# Patient Record
Sex: FEMALE | Race: BLACK OR AFRICAN AMERICAN | NOT HISPANIC OR LATINO | Employment: OTHER | URBAN - METROPOLITAN AREA
[De-identification: names, ages, dates, MRNs, and addresses within clinical notes are randomized per-mention and may not be internally consistent; named-entity substitution may affect disease eponyms.]

---

## 2017-03-17 ENCOUNTER — OFFICE VISIT (OUTPATIENT)
Dept: CARDIOLOGY | Facility: CLINIC | Age: 47
End: 2017-03-17
Payer: MEDICARE

## 2017-03-17 VITALS
DIASTOLIC BLOOD PRESSURE: 60 MMHG | WEIGHT: 213.38 LBS | HEIGHT: 71 IN | HEART RATE: 73 BPM | SYSTOLIC BLOOD PRESSURE: 104 MMHG | BODY MASS INDEX: 29.87 KG/M2 | OXYGEN SATURATION: 98 %

## 2017-03-17 DIAGNOSIS — K58.9 IRRITABLE BOWEL SYNDROME, UNSPECIFIED TYPE: ICD-10-CM

## 2017-03-17 DIAGNOSIS — I35.0 AORTIC VALVE STENOSIS, UNSPECIFIED ETIOLOGY: ICD-10-CM

## 2017-03-17 DIAGNOSIS — R07.9 CHEST PAIN, UNSPECIFIED TYPE: Primary | ICD-10-CM

## 2017-03-17 DIAGNOSIS — E78.5 HYPERLIPIDEMIA, UNSPECIFIED HYPERLIPIDEMIA TYPE: ICD-10-CM

## 2017-03-17 PROCEDURE — 93005 ELECTROCARDIOGRAM TRACING: CPT | Mod: PBBFAC | Performed by: INTERNAL MEDICINE

## 2017-03-17 PROCEDURE — 99213 OFFICE O/P EST LOW 20 MIN: CPT | Mod: PBBFAC | Performed by: INTERNAL MEDICINE

## 2017-03-17 PROCEDURE — 99204 OFFICE O/P NEW MOD 45 MIN: CPT | Mod: S$PBB,,, | Performed by: INTERNAL MEDICINE

## 2017-03-17 PROCEDURE — 93010 ELECTROCARDIOGRAM REPORT: CPT | Mod: S$PBB,,, | Performed by: INTERNAL MEDICINE

## 2017-03-17 PROCEDURE — 99999 PR PBB SHADOW E&M-EST. PATIENT-LVL III: CPT | Mod: PBBFAC,,, | Performed by: INTERNAL MEDICINE

## 2017-03-17 NOTE — LETTER
March 17, 2017      Shay Webber MD  [address] [place] [address]  [address]  [facility], [facility]  [place] [address]           [facility] [address] [facility] [address]  [place] [address]  Phone: [phone]          Patient: Sindhu Diaz   MR Number: [id number]   YOB: 1970   Date of Visit: 3/17/2017       Dear Dr. Shay Webber:    Thank you for referring Sindhu Diaz to me for evaluation. Attached you will find relevant portions of my assessment and plan of care.    If you have questions, please do not hesitate to call me. I look forward to following Sindhu Diaz along with you.    Sincerely,    Javi Casey MD    Enclosure  CC:  No Recipients    If you would like to receive this communication electronically, please contact [email or web address] or [phone] to request more information on [employer] Link access.    For providers and/or their staff who would like to refer a patient to [facility], please contact us through our one-stop-shop provider referral line, [facility], at [phone].    If you feel you have received this communication in error or would no longer like to receive these types of communications, please e-mail [email or web address]

## 2017-03-17 NOTE — PROGRESS NOTES
Subjective:    Patient ID:  Sindhu Diaz is a 46 y.o. female who presents for follow-up of Chest Pain      HPI     Referred by Dr Webber for CP and pre-op clearance prior to GI scope    Admitted 12/2016  This is a 46-year-old female with Asthma who presents with shortness of breath with laying flat along with left-sided chest pain since night PTA. Says pain is in left precordial region, sharp,occurs with lying down and improves with sitting up. No radiation. Associated with SOB also with lying down. Requiring to raise head to sleep. No similar symptoms in the past. Patient denies cough, fever, or congestion. No abdominal pain. No history of kidney or heart disease. Has been having difficulty tying shoe lace for past few months sec to SOB and having intermittent swelling of legs for same time as per her. Says was drinking lot of Sodas 6 months ago and gained 30 LB then. Quit drinking sodas 3 months ago and recently lost those 30 pounds. Was admitted to r/o ACS.    Hospital Course:   Remained vitally stable here. 3 sets of C.E are wnl. Echo showed normal LVEF, no diastolic dysfunction and mild A.S. Initial CXR showed LLL opacity which was mostly a pneumonia. Was started on empiric oral antibiotics. Also received I.V Lasix x 2 for possible small pleural effusion associated with pneumonia. Repeat CXR this morning showed improvement. But patient is still c/o chest pain and SOB while lying down. No mention of this in Nursing notes and did not report this to them either. Vitally stable and no acute medical condition identified as discussed above. Her symptoms might still be sec to pneumonia. She is therefore being d/latrice home in a stable condition and is asked to complete ABx course. Will need yearly Echo for f/u of mild A.S. Patient aware of this.     Echo 12/14/16    1 - Normal left ventricular systolic function (EF 55-60%).     2 - Concentric remodeling.     3 - Normal left ventricular diastolic function.     4 - Normal  right ventricular systolic function .     5 - Mild aortic stenosis, NITZA = 1.99 cm2, mean gradient = 3.6 mmHg.     6 - Appears to possiblely be a bicuspid aortic valve.     7 - Mild mitral regurgitation.     8 - Trivial tricuspid regurgitation.     9 - The estimated PA systolic pressure is 15 mmHg.     Stress test was not done  Still with COLEMAN and episodic CP  EKG NSR - ok    Review of Systems   Constitution: Negative for decreased appetite.   HENT: Negative for ear discharge.    Eyes: Negative for blurred vision.   Respiratory: Negative for hemoptysis.    Endocrine: Negative for polyphagia.   Hematologic/Lymphatic: Negative for adenopathy.   Skin: Negative for color change.   Musculoskeletal: Negative for joint swelling.   Neurological: Negative for brief paralysis.   Psychiatric/Behavioral: Negative for hallucinations.        Objective:    Physical Exam   Constitutional: She is oriented to person, place, and time. She appears well-developed and well-nourished.   HENT:   Head: Normocephalic and atraumatic.   Eyes: Conjunctivae are normal. Pupils are equal, round, and reactive to light.   Neck: Normal range of motion. Neck supple.   Cardiovascular: Normal rate, normal heart sounds and intact distal pulses.    Pulmonary/Chest: Effort normal and breath sounds normal.   Abdominal: Soft. Bowel sounds are normal.   Musculoskeletal: Normal range of motion.   Neurological: She is alert and oriented to person, place, and time.   Skin: Skin is warm and dry.         Assessment:       1. Chest pain, unspecified type    2. Aortic valve stenosis, unspecified etiology    3. Hyperlipidemia, unspecified hyperlipidemia type    4. Irritable bowel syndrome, unspecified type         Plan:           exercise myoview for recurrent CP and COLEMAN  Will clear for GI evaluation if negative

## 2017-03-17 NOTE — MR AVS SNAPSHOT
[facility]  [address] [facility] [place] [address]  Phone: [phone]                  Sindhu Diaz   3/17/2017 3:15 PM   Office Visit    Description:  Female : 1970   Provider:  Javi Casey MD   Department:  [facility]           Reason for Visit     Chest Pain                To Do List           Goals (5 Years of Data)     None      [facility]     [facility] On Call Nurse Care Line -  Assistance  Registered nurses in the [facility] Center provide clinical advisement, health education, appointment booking, and other advisory services.  Call for this free service at [phone].             Medications           Message regarding Medications     Verify the changes and/or additions to your medication regime listed below are the same as discussed with your clinician today.  If any of these changes or additions are incorrect, please notify your healthcare provider.             Verify that the below list of medications is an accurate representation of the medications you are currently taking.  If none reported, the list may be blank. If incorrect, please contact your healthcare provider. Carry this list with you in case of emergency.           Current Medications     butalbital-acetaminophen-caffeine -40 mg (FIORICET, ESGIC) -40 mg per tablet Take 1-2 tablets by mouth every 4 to 6 hours as needed for Headaches.    gabapentin (NEURONTIN) 300 MG capsule Take 300 mg by mouth continuous prn.     hydrocodone-acetaminophen 10-325mg (NORCO)  mg Tab     LINZESS 290 mcg Cap TK ONE C PO QD ON EMPTY STOMACH    naratriptan (AMERGE) 2.5 MG tablet Take 1 tablet (2.5 mg total) by mouth every 4 (four) hours as needed for Migraine. No more than 2 in 24 hours.    NEXIUM 40 mg capsule Take 40 mg by mouth every other day.     ondansetron (ZOFRAN) 4 MG tablet Take 1 tablet (4 mg total) by mouth every 6 (six) hours as needed for Nausea.    PROAIR HFA 90  "mcg/actuation inhaler Inhale 1 puff into the lungs every 4 (four) hours as needed.     simvastatin (ZOCOR) 40 MG tablet     SYMBICORT 160-4.5 mcg/actuation HFAA Inhale 2 puffs into the lungs once daily.     tramadol (ULTRAM) 50 mg tablet            Clinical Reference Information           Your Vitals Were     BP Pulse Height Weight Last Period SpO2    104/60 (BP Location: Left arm, Patient Position: Sitting, BP Method: Automatic) 73 5' 11" (1.803 m) 96.8 kg (213 lb 6.5 oz) (LMP Unknown) 98%    BMI                29.76 kg/m2          Blood Pressure          Most Recent Value    BP  104/60      Allergies as of 3/17/2017     No Known Allergies      Immunizations Administered on Date of Encounter - 3/17/2017     None      [facility] Sign-Up     Activating your [facility] account is as easy as 1-2-3!     1) Visit [email or web address], select Sign Up Now, enter this activation code and your date of birth, then select Next.  Activation code not generated  Current Patient Portal Status: Account disabled      2) Create a username and password to use when you visit [facility] in the future and select a security question in case you lose your password and select Next.    3) Enter your e-mail address and click Sign Up!    Additional Information  If you have questions, please e-mail [email or web address] or call [phone] to talk to our [facility] staff. Remember, [facility] is NOT to be used for urgent needs. For medical emergencies, dial 911.         Language Assistance Services     ATTENTION: Language assistance services are available, free of charge. Please call [phone].      ATENCIÓN: Si habla español, tiene a mata disposición servicios gratuitos de asistencia lingüística. Llame al [phone].     [facility] Ý: N?u b?n nói Ti?ng Vi?t, có các d?ch v? h? tr? ngôn ng? mi?n phí dành cho b?n. G?i s? [phone].         [facility] complies with applicable Federal civil rights laws and does not discriminate on the basis " of race, color, national origin, age, disability, or sex.

## 2017-03-27 ENCOUNTER — HOSPITAL ENCOUNTER (OUTPATIENT)
Dept: CARDIOLOGY | Facility: HOSPITAL | Age: 47
Discharge: HOME | End: 2017-03-27
Attending: INTERNAL MEDICINE

## 2017-04-03 ENCOUNTER — HOSPITAL ENCOUNTER (OUTPATIENT)
Dept: RADIOLOGY | Facility: HOSPITAL | Age: 47
Discharge: HOME | End: 2017-04-03
Attending: INTERNAL MEDICINE
Payer: MEDICARE

## 2017-04-03 ENCOUNTER — HOSPITAL ENCOUNTER (OUTPATIENT)
Dept: CARDIOLOGY | Facility: HOSPITAL | Age: 47
Discharge: HOME | End: 2017-04-03
Attending: INTERNAL MEDICINE
Payer: MEDICARE

## 2017-04-03 DIAGNOSIS — I35.0 AORTIC VALVE STENOSIS, UNSPECIFIED ETIOLOGY: ICD-10-CM

## 2017-04-03 DIAGNOSIS — E78.5 HYPERLIPIDEMIA, UNSPECIFIED HYPERLIPIDEMIA TYPE: ICD-10-CM

## 2017-04-03 DIAGNOSIS — R07.9 CHEST PAIN, UNSPECIFIED TYPE: ICD-10-CM

## 2017-04-03 DIAGNOSIS — K58.9 IRRITABLE BOWEL SYNDROME, UNSPECIFIED TYPE: ICD-10-CM

## 2017-04-03 LAB — DIASTOLIC DYSFUNCTION: NO

## 2017-04-03 PROCEDURE — 78452 HT MUSCLE IMAGE SPECT MULT: CPT | Mod: 26,,, | Performed by: INTERNAL MEDICINE

## 2017-04-03 PROCEDURE — 78452 HT MUSCLE IMAGE SPECT MULT: CPT | Mod: TC

## 2017-04-03 PROCEDURE — 93018 CV STRESS TEST I&R ONLY: CPT | Mod: ,,, | Performed by: INTERNAL MEDICINE

## 2017-04-03 PROCEDURE — 93017 CV STRESS TEST TRACING ONLY: CPT

## 2017-04-03 PROCEDURE — 93016 CV STRESS TEST SUPVJ ONLY: CPT | Mod: ,,, | Performed by: INTERNAL MEDICINE

## 2017-04-05 ENCOUNTER — OFFICE VISIT (OUTPATIENT)
Dept: CARDIOLOGY | Facility: CLINIC | Age: 47
End: 2017-04-05
Payer: MEDICARE

## 2017-04-05 VITALS
HEIGHT: 71 IN | OXYGEN SATURATION: 98 % | BODY MASS INDEX: 29.94 KG/M2 | SYSTOLIC BLOOD PRESSURE: 120 MMHG | HEART RATE: 65 BPM | DIASTOLIC BLOOD PRESSURE: 63 MMHG | WEIGHT: 213.88 LBS

## 2017-04-05 DIAGNOSIS — I35.0 AORTIC VALVE STENOSIS, UNSPECIFIED ETIOLOGY: ICD-10-CM

## 2017-04-05 DIAGNOSIS — R07.9 CHEST PAIN, UNSPECIFIED TYPE: Primary | ICD-10-CM

## 2017-04-05 DIAGNOSIS — E78.5 HYPERLIPIDEMIA, UNSPECIFIED HYPERLIPIDEMIA TYPE: ICD-10-CM

## 2017-04-05 PROCEDURE — 99213 OFFICE O/P EST LOW 20 MIN: CPT | Mod: PBBFAC | Performed by: INTERNAL MEDICINE

## 2017-04-05 PROCEDURE — 99214 OFFICE O/P EST MOD 30 MIN: CPT | Mod: S$PBB,,, | Performed by: INTERNAL MEDICINE

## 2017-04-05 PROCEDURE — 99999 PR PBB SHADOW E&M-EST. PATIENT-LVL III: CPT | Mod: PBBFAC,,, | Performed by: INTERNAL MEDICINE

## 2017-04-05 RX ORDER — NAPROXEN SODIUM 220 MG/1
81 TABLET, FILM COATED ORAL DAILY
Refills: 0 | COMMUNITY
Start: 2017-04-05 | End: 2020-08-24 | Stop reason: SDUPTHER

## 2017-04-05 RX ORDER — METOPROLOL SUCCINATE 25 MG/1
25 TABLET, EXTENDED RELEASE ORAL DAILY
Qty: 30 TABLET | Refills: 11 | Status: ON HOLD | OUTPATIENT
Start: 2017-04-05 | End: 2017-04-11 | Stop reason: HOSPADM

## 2017-04-05 RX ORDER — ISOSORBIDE MONONITRATE 30 MG/1
30 TABLET, EXTENDED RELEASE ORAL DAILY
Qty: 30 TABLET | Refills: 11 | Status: SHIPPED | OUTPATIENT
Start: 2017-04-05 | End: 2017-04-10

## 2017-04-05 RX ORDER — SODIUM CHLORIDE 9 MG/ML
INJECTION, SOLUTION INTRAVENOUS CONTINUOUS
Status: CANCELLED | OUTPATIENT
Start: 2017-04-05

## 2017-04-05 NOTE — MR AVS SNAPSHOT
[facility]  [address] [facility] [address] [place] [address]  Phone: [phone]                  Sindhu Diaz   2017 1:00 PM   Office Visit    Description:  Female : 1970   Provider:  Javi Casey MD   Department:  [facility]                To Do List           Goals (5 Years of Data)     None      [facility]     [facility] On Call Nurse Care Line -  Assistance  Unless otherwise directed by your provider, please contact [facility], our nurse care line that is available for  assistance.     Registered nurses in the [facility] Center provide: appointment scheduling, clinical advisement, health education, and other advisory services.  Call: [phone] (toll free)               Medications           Message regarding Medications     Verify the changes and/or additions to your medication regime listed below are the same as discussed with your clinician today.  If any of these changes or additions are incorrect, please notify your healthcare provider.             Verify that the below list of medications is an accurate representation of the medications you are currently taking.  If none reported, the list may be blank. If incorrect, please contact your healthcare provider. Carry this list with you in case of emergency.           Current Medications     butalbital-acetaminophen-caffeine -40 mg (FIORICET, ESGIC) -40 mg per tablet Take 1-2 tablets by mouth every 4 to 6 hours as needed for Headaches.    gabapentin (NEURONTIN) 300 MG capsule Take 300 mg by mouth continuous prn.     hydrocodone-acetaminophen 10-325mg (NORCO)  mg Tab     LINZESS 290 mcg Cap TK ONE C PO QD ON EMPTY STOMACH    naratriptan (AMERGE) 2.5 MG tablet Take 1 tablet (2.5 mg total) by mouth every 4 (four) hours as needed for Migraine. No more than 2 in 24 hours.    NEXIUM 40 mg capsule Take 40 mg by mouth every other day.     ondansetron (ZOFRAN) 4 MG tablet Take 1 tablet  "(4 mg total) by mouth every 6 (six) hours as needed for Nausea.    PROAIR HFA 90 mcg/actuation inhaler Inhale 1 puff into the lungs every 4 (four) hours as needed.     simvastatin (ZOCOR) 40 MG tablet     SYMBICORT 160-4.5 mcg/actuation HFAA Inhale 2 puffs into the lungs once daily.     tramadol (ULTRAM) 50 mg tablet            Clinical Reference Information           Your Vitals Were     BP Pulse Height Weight Last Period SpO2    120/63 (BP Location: Left arm, Patient Position: Sitting, BP Method: Automatic) 65 5' 11" (1.803 m) 97 kg (213 lb 13.5 oz) (LMP Unknown) 98%    BMI                29.83 kg/m2          Blood Pressure          Most Recent Value    BP  120/63      Allergies as of 4/5/2017     No Known Allergies      Immunizations Administered on Date of Encounter - 4/5/2017     None      [facility] Sign-Up     Activating your [facility] account is as easy as 1-2-3!     1) Visit [email or web address], select Sign Up Now, enter this activation code and your date of birth, then select Next.  Activation code not generated  Current Patient Portal Status: Account disabled      2) Create a username and password to use when you visit [facility] in the future and select a security question in case you lose your password and select Next.    3) Enter your e-mail address and click Sign Up!    Additional Information  If you have questions, please e-mail [email or web address] or call [phone] to talk to our [facility] staff. Remember, [facility] is NOT to be used for urgent needs. For medical emergencies, dial 911.         Language Assistance Services     ATTENTION: Language assistance services are available, free of charge. Please call [phone].      ATENCIÓN: Si habla español, tiene a mata disposición servicios gratuitos de asistencia lingüística. Llame al [phone].     CHÚ Ý: N?u b?n nói Ti?ng Vi?t, có các d?ch v? h? tr? ngôn ng? mi?n phí dành cho b?n. G?i s? [phone].         [facility] complies " with applicable Federal civil rights laws and does not discriminate on the basis of race, color, national origin, age, disability, or sex.

## 2017-04-05 NOTE — H&P
[facility] - Cardiology  History & Physical    Subjective:      Chief Complaint/Reason for Admission: [facility]    Sindhu Diaz is a 46 y.o. female.    Referred by Dr Webber for CP and pre-op clearance prior to GI scope     Admitted 12/2016  This is a 46-year-old female with Asthma who presents with shortness of breath with laying flat along with left-sided chest pain since night PTA. Says pain is in left precordial region, sharp,occurs with lying down and improves with sitting up. No radiation. Associated with SOB also with lying down. Requiring to raise head to sleep. No similar symptoms in the past. Patient denies cough, fever, or congestion. No abdominal pain. No history of kidney or heart disease. Has been having difficulty tying shoe lace for past few months sec to SOB and having intermittent swelling of legs for same time as per her. Says was drinking lot of Sodas 6 months ago and gained 30 LB then. Quit drinking sodas 3 months ago and recently lost those 30 pounds. Was admitted to r/o ACS.     Hospital Course:   Remained vitally stable here. 3 sets of C.E are wnl. Echo showed normal LVEF, no diastolic dysfunction and mild A.S. Initial CXR showed LLL opacity which was mostly a pneumonia. Was started on empiric oral antibiotics. Also received I.V Lasix x 2 for possible small pleural effusion associated with pneumonia. Repeat CXR this morning showed improvement. But patient is still c/o chest pain and SOB while lying down. No mention of this in Nursing notes and did not report this to them either. Vitally stable and no acute medical condition identified as discussed above. Her symptoms might still be sec to pneumonia. She is therefore being d/latrice home in a stable condition and is asked to complete ABx course. Will need yearly Echo for f/u of mild A.S. Patient aware of this.      Echo 12/14/16    1 - Normal left ventricular systolic function (EF 55-60%).     2 - Concentric remodeling.     3 - Normal left ventricular  diastolic function.     4 - Normal right ventricular systolic function .     5 - Mild aortic stenosis, NITZA = 1.99 cm2, mean gradient = 3.6 mmHg.     6 - Appears to possiblely be a bicuspid aortic valve.     7 - Mild mitral regurgitation.     8 - Trivial tricuspid regurgitation.     9 - The estimated PA systolic pressure is 15 mmHg.     Stress test 4/3/17  LVEF: 67 %  Impression: ABNORMAL MYOCARDIAL PERFUSION  1. There is evidence for moderate myocardial ischemia in the anterior wall of the left ventricle.   2. The perfusion scan is free of evidence for myocardial injury.   3. Resting wall motion is physiologic.   4. Resting LV function is normal.     Still with CP off and on         Past Medical History:   Diagnosis Date    Asthma     GERD (gastroesophageal reflux disease)     Hyperlipidemia     IBS (irritable bowel syndrome)      Past Surgical History:   Procedure Laterality Date    CHOLECYSTECTOMY      HYSTERECTOMY       Family History   Problem Relation Age of Onset    Heart attack Father      at 70     Lymphoma Mother     Heart disease Sister     Breast cancer Neg Hx     Colon cancer Neg Hx     Ovarian cancer Neg Hx      Social History   Substance Use Topics    Smoking status: Former Smoker     Packs/day: 1.00     Years: 15.00     Types: Cigarettes    Smokeless tobacco: Never Used      Comment: quit when she was 33 yrs old.    Alcohol use Yes      Comment: social drinker.         (Not in a hospital admission)  Review of patient's allergies indicates:  No Known Allergies     Review of Systems   All other systems reviewed and are negative.      Objective:      Vital Signs (Most Recent)  Pulse: 65 (04/05/17 1322)  BP: 120/63 (04/05/17 1322)  SpO2: 98 % (04/05/17 1322)    Vital Signs Range (Last 24H):  [unfilled]    Physical Exam   Constitutional: She is oriented to person, place, and time. She appears well-developed and well-nourished.   HENT:   Head: Normocephalic and atraumatic.   Eyes: EOM  are normal. Pupils are equal, round, and reactive to light.   Neck: Normal range of motion. Neck supple.   Cardiovascular: Normal rate and regular rhythm.    Pulmonary/Chest: Effort normal and breath sounds normal.   Abdominal: Soft. Bowel sounds are normal.   Musculoskeletal: Normal range of motion.   Neurological: She is alert and oriented to person, place, and time.   Skin: Skin is warm and dry.       Data Review:    CBC:   Lab Results   Component Value Date    WBC 3.92 02/21/2017    RBC 4.53 02/21/2017    HGB 12.7 02/21/2017    HCT 38.9 02/21/2017     02/21/2017     BMP:   Lab Results   Component Value Date    GLU 87 02/21/2017     02/21/2017    K 3.8 02/21/2017     02/21/2017    CO2 29 02/21/2017    BUN 9 02/21/2017    CREATININE 0.66 02/21/2017    CALCIUM 9.0 02/21/2017      ECG: normal sinus rhythm, no blocks or conduction defects, no ischemic changes.     Assessment:      There are no hospital problems to display for this patient.   CP with anterior ischemia on stress test. On 2 anti-anginal medications    Plan:      [facility]

## 2017-04-05 NOTE — PROGRESS NOTES
Subjective:    Patient ID:  Sindhu Diaz is a 46 y.o. female who presents for follow-up of Chest Pain      HPI     Referred by Dr Webber for CP and pre-op clearance prior to GI scope     Admitted 12/2016  This is a 46-year-old female with Asthma who presents with shortness of breath with laying flat along with left-sided chest pain since night PTA. Says pain is in left precordial region, sharp,occurs with lying down and improves with sitting up. No radiation. Associated with SOB also with lying down. Requiring to raise head to sleep. No similar symptoms in the past. Patient denies cough, fever, or congestion. No abdominal pain. No history of kidney or heart disease. Has been having difficulty tying shoe lace for past few months sec to SOB and having intermittent swelling of legs for same time as per her. Says was drinking lot of Sodas 6 months ago and gained 30 LB then. Quit drinking sodas 3 months ago and recently lost those 30 pounds. Was admitted to r/o ACS.     Hospital Course:   Remained vitally stable here. 3 sets of C.E are wnl. Echo showed normal LVEF, no diastolic dysfunction and mild A.S. Initial CXR showed LLL opacity which was mostly a pneumonia. Was started on empiric oral antibiotics. Also received I.V Lasix x 2 for possible small pleural effusion associated with pneumonia. Repeat CXR this morning showed improvement. But patient is still c/o chest pain and SOB while lying down. No mention of this in Nursing notes and did not report this to them either. Vitally stable and no acute medical condition identified as discussed above. Her symptoms might still be sec to pneumonia. She is therefore being d/latrice home in a stable condition and is asked to complete ABx course. Will need yearly Echo for f/u of mild A.S. Patient aware of this.      Echo 12/14/16    1 - Normal left ventricular systolic function (EF 55-60%).     2 - Concentric remodeling.     3 - Normal left ventricular diastolic function.     4 -  Normal right ventricular systolic function .     5 - Mild aortic stenosis, NITZA = 1.99 cm2, mean gradient = 3.6 mmHg.     6 - Appears to possiblely be a bicuspid aortic valve.     7 - Mild mitral regurgitation.     8 - Trivial tricuspid regurgitation.     9 - The estimated PA systolic pressure is 15 mmHg.     Stress test 4/3/17  LVEF: 67 %  Impression: ABNORMAL MYOCARDIAL PERFUSION  1. There is evidence for moderate myocardial ischemia in the anterior wall of the left ventricle.   2. The perfusion scan is free of evidence for myocardial injury.   3. Resting wall motion is physiologic.   4. Resting LV function is normal.     Still with CP off and on       Review of Systems   Constitution: Negative for decreased appetite.   HENT: Negative for ear discharge.    Eyes: Negative for blurred vision.   Respiratory: Negative for hemoptysis.    Endocrine: Negative for polyphagia.   Hematologic/Lymphatic: Negative for adenopathy.   Skin: Negative for color change.   Musculoskeletal: Negative for joint swelling.   Neurological: Negative for brief paralysis.   Psychiatric/Behavioral: Negative for hallucinations.        Objective:    Physical Exam   Constitutional: She is oriented to person, place, and time. She appears well-developed and well-nourished.   HENT:   Head: Normocephalic and atraumatic.   Eyes: Conjunctivae are normal. Pupils are equal, round, and reactive to light.   Neck: Normal range of motion. Neck supple.   Cardiovascular: Normal rate, normal heart sounds and intact distal pulses.    Pulmonary/Chest: Effort normal and breath sounds normal.   Abdominal: Soft. Bowel sounds are normal.   Musculoskeletal: Normal range of motion.   Neurological: She is alert and oriented to person, place, and time.   Skin: Skin is warm and dry.         Assessment:       1. Chest pain, unspecified type    2. Aortic valve stenosis, unspecified etiology    3. Hyperlipidemia, unspecified hyperlipidemia type         Plan:       ASA 81  qd  Toprol XL 25 qd  Imdur 30 qd  [facility] - risks/benefits explained and she agrees to proceed

## 2017-04-06 ENCOUNTER — TELEPHONE (OUTPATIENT)
Dept: CARDIOLOGY | Facility: CLINIC | Age: 47
End: 2017-04-06

## 2017-04-06 ENCOUNTER — TELEPHONE (OUTPATIENT)
Dept: FAMILY MEDICINE | Facility: CLINIC | Age: 47
End: 2017-04-06

## 2017-04-06 NOTE — TELEPHONE ENCOUNTER
Left message to voicemail [phone] to return call to clinic re: to contact PCP or Cardiology to obtain prescription for baby aspirin

## 2017-04-06 NOTE — TELEPHONE ENCOUNTER
----- Message from Summer Moralez sent at 4/6/2017  7:31 AM CDT -----  Contact: self [phone]  Pt is requesting a script for baby Asprin . The pharmacy told her she can get a script and will nit have to pay for it. Pls call [facility] [phone]. Thanks.......Isabel

## 2017-04-10 ENCOUNTER — HOSPITAL ENCOUNTER (OUTPATIENT)
Dept: PREADMISSION TESTING | Facility: HOSPITAL | Age: 47
Discharge: HOME | End: 2017-04-10
Attending: INTERNAL MEDICINE
Payer: MEDICARE

## 2017-04-10 VITALS
OXYGEN SATURATION: 98 % | TEMPERATURE: 98 F | BODY MASS INDEX: 29.87 KG/M2 | HEART RATE: 73 BPM | DIASTOLIC BLOOD PRESSURE: 71 MMHG | WEIGHT: 213.38 LBS | RESPIRATION RATE: 16 BRPM | SYSTOLIC BLOOD PRESSURE: 106 MMHG | HEIGHT: 71 IN

## 2017-04-10 DIAGNOSIS — R07.9 CHEST PAIN, UNSPECIFIED TYPE: ICD-10-CM

## 2017-04-10 LAB
ANION GAP SERPL CALC-SCNC: 5 MMOL/L
APTT BLDCRRT: 25.2 SEC
BUN SERPL-MCNC: 9 MG/DL
CALCIUM SERPL-MCNC: 9.3 MG/DL
CHLORIDE SERPL-SCNC: 105 MMOL/L
CO2 SERPL-SCNC: 28 MMOL/L
CREAT SERPL-MCNC: 0.8 MG/DL
ERYTHROCYTE [DISTWIDTH] IN BLOOD BY AUTOMATED COUNT: 13.6 %
EST. GFR  (AFRICAN AMERICAN): >60 ML/MIN/1.73 M^2
EST. GFR  (NON AFRICAN AMERICAN): >60 ML/MIN/1.73 M^2
GLUCOSE SERPL-MCNC: 94 MG/DL
HCT VFR BLD AUTO: 37.5 %
HGB BLD-MCNC: 12.3 G/DL
INR PPP: 0.9
MCH RBC QN AUTO: 27.8 PG
MCHC RBC AUTO-ENTMCNC: 32.8 %
MCV RBC AUTO: 85 FL
PLATELET # BLD AUTO: 323 K/UL
PMV BLD AUTO: 10.6 FL
POTASSIUM SERPL-SCNC: 4.2 MMOL/L
PROTHROMBIN TIME: 10 SEC
RBC # BLD AUTO: 4.42 M/UL
SODIUM SERPL-SCNC: 138 MMOL/L
WBC # BLD AUTO: 4.42 K/UL

## 2017-04-10 PROCEDURE — 85610 PROTHROMBIN TIME: CPT

## 2017-04-10 PROCEDURE — 85027 COMPLETE CBC AUTOMATED: CPT

## 2017-04-10 PROCEDURE — 36415 COLL VENOUS BLD VENIPUNCTURE: CPT

## 2017-04-10 PROCEDURE — 80048 BASIC METABOLIC PNL TOTAL CA: CPT

## 2017-04-10 PROCEDURE — 93005 ELECTROCARDIOGRAM TRACING: CPT

## 2017-04-10 PROCEDURE — 85730 THROMBOPLASTIN TIME PARTIAL: CPT

## 2017-04-10 NOTE — PRE-PROCEDURE INSTRUCTIONS
Pre-operative instructions, medication directives and pain scales reviewed with patient.   Instructed to wash with Hibiclens prior to procedure.  All questions the patient had  were answered. Re-assurance about surgical procedure and day of surgery routine given as needed. The patient verbalized understanding of the pre-op instructions.

## 2017-04-10 NOTE — DISCHARGE INSTRUCTIONS
Your surgery is scheduled for _Tuesday April 11, 2017___________________.    Call [phone] between 2 p.m. and 5 p.m. on   _Today___ to find out your arrival time for the day of your surgery.      Please report to SAME DAY SURGERY UNIT on the 2nd FLOOR at _______ a.m.  Use front door entrance. The doors open at 0530 am.          INSTRUCTIONS IMPORTANT!!!  ¨ Do not eat or drink after 12 midnight-including water. OK to brush teeth, no   gum, candy or mints!    ¨ Take only these medicines with a small swallow of water-morning of surgery.  Take Metoprolol and Aspirin with swallow of water.      _x___  Prep instructions:    SHOWER       _x___  Please shower using Hibiclens soap the night before AND  the morning of your surgery/procedure. Do not use                             Hibiclens on your face or genitals       _x___  No shaving of procedural area at least 4-5 days before surgery due to  increased risk of skin irritation and/or possible              infection.    _x___  Do not wear makeup, including mascara. WEARING EYE MAKEUP MAY LEAD TO SERIOUS EYE INJURY during                  surgery.    _x___  No powder, lotions or creams to your body.    _x___  You may wear only deodorant on the day of surgery.    _x___  Please remove all jewelry, including piercings and leave at home.    __x__  No money or valuables needed. Please leave at home.  You may bring your cell phone.    __x__  If going home the same day, arrange for a ride home. You will not be able to   drive if Anesthesia was used.    __x__  Wear loose fitting clothing. Allow for dressings, bandages.    __x___  You MAY use Tylenol/acetaminophen until day of surgery.    __x__  Call MD for temperature above 101 degrees.                 I have read or had read and explained to me, and understand the above information.  Additional comments or instructions:Please call   [phone] if you have any questions regarding the instructions above.

## 2017-04-10 NOTE — IP AVS SNAPSHOT
[place] [address] [place] [address]  Phone: [phone]           Patient Discharge Instructions  Our goal is to set you up for success. This packet includes information on your condition, medications, and your home care. It will help you care for yourself to prevent having to return to the hospital.     Please ask your nurse if you have any questions.      There are many details to remember when preparing for your surgery. Here is what you will need to do, please ask your nurse if there are more specific instructions and if you have any questions:    1. Before procedure Do not smoke or drink alcoholic beverages 24 hours prior to your procedure. Do not eat or drink anything 8 hours before your procedure - this includes gum, mints, and candy.     2. Day of procedure Please remove all jewelry for the procedure. If you wear contact lenses, dentures, hearing aids or glasses, bring a container to put them in during your surgery and give to a family member.  If your doctor has scheduled you for an overnight stay, bring a small overnight bag with any personal items that you need.      3. After procedure  Make arrangements in advance for transportation home by a responsible adult. It is not safe to drive a vehicle during the 24 hours following surgery.     PLEASE NOTE: You may be contacted the day before your surgery to confirm your surgery date and arrival time. The Surgery schedule has many variables which may affect the time of your surgery case. Family members should be available if your surgery time changes.           [facility]  Unless otherwise directed by your provider, please   contact [facility], our nurse care line   that is available for 24/7 assistance.     [phone] (toll-free)     Registered nurses in the [facility]   provide: appointment scheduling, clinical advisement, health education, and other advisory services.                  ** Verify  the list of medication(s) below is accurate and up to date. Carry this with you in case of emergency. If your medications have changed, please notify your healthcare provider.             Medication List      TAKE these medications        Additional Info                      aspirin 81 MG Chew   Refills:  0   Dose:  81 mg    Instructions:  Take 1 tablet (81 mg total) by mouth once daily.     Begin Date    AM    Noon    PM    Bedtime       butalbital-acetaminophen-caffeine -40 mg -40 mg per tablet   Commonly known as:  FIORICET, ESGIC   Quantity:  50 tablet   Refills:  1   Dose:  1-2 tablet    Instructions:  Take 1-2 tablets by mouth every 4 to 6 hours as needed for Headaches.     Begin Date    AM    Noon    PM    Bedtime       gabapentin 300 MG capsule   Commonly known as:  NEURONTIN   Refills:  3   Dose:  300 mg    Instructions:  Take 300 mg by mouth continuous prn.     Begin Date    AM    Noon    PM    Bedtime       hydrocodone-acetaminophen 10-325mg  mg Tab   Commonly known as:  NORCO   Refills:  0      Begin Date    AM    Noon    PM    Bedtime       LINZESS 290 mcg Cap   Refills:  2   Generic drug:  linaclotide    Instructions:  TK ONE C PO QD ON EMPTY STOMACH     Begin Date    AM    Noon    PM    Bedtime       metoprolol succinate 25 MG 24 hr tablet   Commonly known as:  TOPROL-XL   Quantity:  30 tablet   Refills:  11   Dose:  25 mg    Instructions:  Take 1 tablet (25 mg total) by mouth once daily.     Begin Date    AM    Noon    PM    Bedtime       naratriptan 2.5 MG tablet   Commonly known as:  AMERGE   Quantity:  9 tablet   Refills:  5   Dose:  2.5 mg    Instructions:  Take 1 tablet (2.5 mg total) by mouth every 4 (four) hours as needed for Migraine. No more than 2 in 24 hours.     Begin Date    AM    Noon    PM    Bedtime       NEXIUM 40 MG capsule   Refills:  2   Dose:  40 mg   Generic drug:  esomeprazole    Instructions:  Take 40 mg by mouth every other day.     Begin Date    AM    Noon     PM    Bedtime       ondansetron 4 MG tablet   Commonly known as:  ZOFRAN   Quantity:  12 tablet   Refills:  0   Dose:  4 mg    Instructions:  Take 1 tablet (4 mg total) by mouth every 6 (six) hours as needed for Nausea.     Begin Date    AM    Noon    PM    Bedtime       PROAIR HFA 90 mcg/actuation inhaler   Refills:  0   Dose:  1 puff   Generic drug:  albuterol    Instructions:  Inhale 1 puff into the lungs every 4 (four) hours as needed.     Begin Date    AM    Noon    PM    Bedtime       simvastatin 40 MG tablet   Commonly known as:  ZOCOR   Refills:  5      Begin Date    AM    Noon    PM    Bedtime       SYMBICORT 160-4.5 mcg/actuation Hfaa   Refills:  0   Dose:  2 puff   Generic drug:  budesonide-formoterol 160-4.5 mcg    Instructions:  Inhale 2 puffs into the lungs once daily.     Begin Date    AM    Noon    PM    Bedtime       tramadol 50 mg tablet   Commonly known as:  ULTRAM   Refills:  1      Begin Date    AM    Noon    PM    Bedtime                  Please bring to all follow up appointments:    1. A copy of your discharge instructions.  2. All medicines you are currently taking in their original bottles.  3. Identification and insurance card.    Please arrive 15 minutes ahead of scheduled appointment time.    Please call 24 hours in advance if you must reschedule your appointment and/or time.        Your Scheduled Appointments     Apr 25, 2017  1:30 PM CDT   Post OP with Javi Casey MD   [facility] - Cardiology ([facility])    [address] [place] [id number] [phone]              Your Future Surgeries/Procedures     Apr 11, 2017   Surgery with Javi Casey MD   [facility] ([facility])    [address]  [place] [id number] [phone]                  Discharge Instructions         Your surgery is scheduled for _Tuesday April 11, 2017___________________.    Call [phone] between 2 p.m. and 5 p.m. on   _Today___ to  find out your arrival time for the day of your surgery.      Please report to SAME DAY SURGERY UNIT on the 2nd FLOOR at _______ a.m.  Use front door entrance. The doors open at 0530 am.          INSTRUCTIONS IMPORTANT!!!  ¨ Do not eat or drink after 12 midnight-including water. OK to brush teeth, no   gum, candy or mints!    ¨ Take only these medicines with a small swallow of water-morning of surgery.  Take Metoprolol and Aspirin with swallow of water.      _x___  Prep instructions:    SHOWER       _x___  Please shower using Hibiclens soap the night before AND  the morning of your surgery/procedure. Do not use                             Hibiclens on your face or genitals       _x___  No shaving of procedural area at least 4-5 days before surgery due to  increased risk of skin irritation and/or possible              infection.    _x___  Do not wear makeup, including mascara. WEARING EYE MAKEUP MAY LEAD TO SERIOUS EYE INJURY during                  surgery.    _x___  No powder, lotions or creams to your body.    _x___  You may wear only deodorant on the day of surgery.    _x___  Please remove all jewelry, including piercings and leave at home.    __x__  No money or valuables needed. Please leave at home.  You may bring your cell phone.    __x__  If going home the same day, arrange for a ride home. You will not be able to   drive if Anesthesia was used.    __x__  Wear loose fitting clothing. Allow for dressings, bandages.    __x___  You MAY use Tylenol/acetaminophen until day of surgery.    __x__  Call MD for temperature above 101 degrees.                 I have read or had read and explained to me, and understand the above information.  Additional comments or instructions:Please call   [phone] if you have any questions regarding the instructions above.                 Admission Information     Date & Time Provider Department [facility]    4/10/2017  8:30 AM Javi Casey MD [facility] [phone]     "  Care Providers     Provider Role Specialty Primary office phone    Javi Casey MD Attending Provider Cardiology [phone]      Your Vitals Were     BP Pulse Temp Resp Height Weight    106/71 (BP Location: Left arm, Patient Position: Sitting, BP Method: Automatic) 73 98.3 °F (36.8 °C) (Oral) 16 5' 11" (1.803 m) 96.8 kg (213 lb 6.5 oz)    Last Period SpO2 BMI          (LMP Unknown) 98% 29.76 kg/m2        Recent Lab Values        2/21/2017                          12:05 PM           A1C 5.6           Comment for A1C at 12:05 PM on 2/21/2017:  According to ADA guidelines, hemoglobin A1C <7.0% represents  optimal control in non-pregnant diabetic patients.  Different  metrics may apply to specific populations.   Standards of Medical Care in Diabetes - 2016.  For the purpose of screening for the presence of diabetes:  <5.7%     Consistent with the absence of diabetes  5.7-6.4%  Consistent with increasing risk for diabetes   (prediabetes)  >or=6.5%  Consistent with diabetes  Currently no consensus exists for use of hemoglobin A1C  for diagnosis of diabetes for children.        Allergies as of 4/10/2017     No Known Allergies      Advance Directives     An advance directive is a document which, in the event you are no longer able to make decisions for yourself, tells your healthcare team what kind of treatment you do or do not want to receive, or who you would like to make those decisions for you.  If you do not currently have an advance directive, [facility] encourages you to create one.  For more information call:  [phone]), [phone] ([phone]),  or log on to [email or web address].        Smoking Cessation     If you would like to quit smoking:   You may be eligible for free services if you are a [place] resident and started smoking cigarettes before September 1, 1988.  Call the Smoking Cessation Trust (SCT) toll free at (887) 739-8119 or (028) 762-4654.   Call 9-709-QUIT-NOW if you " do not meet the above criteria.   Contact us via email: [email or web address]   View our website for more information: [email or web address]        Language Assistance Services     ATTENTION: Language assistance services are available, free of charge. Please call [phone].      ATENCIÓN: Si anitha acevedo, tiene a mata disposición servicios gratuitos de asistencia lingüística. Llame al [phone].     CHÚ Ý: N?u b?n nói Ti?ng Vi?t, có các d?ch v? h? tr? ngôn ng? mi?n phí dành cho b?n. G?i s? [phone].        Pneumonmia Discharge Instructions                [facility] Sign-Up     Activating your [facility] account is as easy as 1-2-3!     1) Visit [email or web address], select Sign Up Now, enter this activation code and your date of birth, then select Next.  Activation code not generated  Current Patient Portal Status: Account disabled      2) Create a username and password to use when you visit [facility] in the future and select a security question in case you lose your password and select Next.    3) Enter your e-mail address and click Sign Up!    Additional Information  If you have questions, please e-mail [email or web address] or call [phone] to talk to our [facility] staff. Remember, [facility] is NOT to be used for urgent needs. For medical emergencies, dial 911.          [facility] complies with applicable Federal civil rights laws and does not discriminate on the basis of race, color, national origin, age, disability, or sex.

## 2017-04-11 ENCOUNTER — SURGERY (OUTPATIENT)
Age: 47
End: 2017-04-11

## 2017-04-11 ENCOUNTER — HOSPITAL ENCOUNTER (OUTPATIENT)
Facility: HOSPITAL | Age: 47
Discharge: HOME | End: 2017-04-11
Attending: INTERNAL MEDICINE | Admitting: INTERNAL MEDICINE
Payer: MEDICARE

## 2017-04-11 VITALS
TEMPERATURE: 97 F | HEIGHT: 71 IN | SYSTOLIC BLOOD PRESSURE: 118 MMHG | DIASTOLIC BLOOD PRESSURE: 68 MMHG | OXYGEN SATURATION: 99 % | RESPIRATION RATE: 18 BRPM | BODY MASS INDEX: 29.87 KG/M2 | WEIGHT: 213.38 LBS | HEART RATE: 60 BPM

## 2017-04-11 DIAGNOSIS — R20.0 NUMBNESS AND TINGLING IN LEFT ARM: Primary | ICD-10-CM

## 2017-04-11 DIAGNOSIS — G43.019 INTRACTABLE MIGRAINE WITHOUT AURA AND WITHOUT STATUS MIGRAINOSUS: ICD-10-CM

## 2017-04-11 DIAGNOSIS — R07.9 CHEST PAIN, UNSPECIFIED TYPE: ICD-10-CM

## 2017-04-11 DIAGNOSIS — R20.2 NUMBNESS AND TINGLING IN LEFT ARM: Primary | ICD-10-CM

## 2017-04-11 PROCEDURE — C1760 CLOSURE DEV, VASC: HCPCS

## 2017-04-11 PROCEDURE — 93458 L HRT ARTERY/VENTRICLE ANGIO: CPT | Mod: 26,,, | Performed by: INTERNAL MEDICINE

## 2017-04-11 PROCEDURE — 25000003 PHARM REV CODE 250: Performed by: INTERNAL MEDICINE

## 2017-04-11 PROCEDURE — 99152 MOD SED SAME PHYS/QHP 5/>YRS: CPT | Mod: ,,, | Performed by: INTERNAL MEDICINE

## 2017-04-11 PROCEDURE — 25000003 PHARM REV CODE 250

## 2017-04-11 PROCEDURE — 99152 MOD SED SAME PHYS/QHP 5/>YRS: CPT

## 2017-04-11 PROCEDURE — 63600175 PHARM REV CODE 636 W HCPCS

## 2017-04-11 RX ORDER — SODIUM CHLORIDE 9 MG/ML
INJECTION, SOLUTION INTRAVENOUS CONTINUOUS
Status: ACTIVE | OUTPATIENT
Start: 2017-04-11 | End: 2017-04-11

## 2017-04-11 RX ORDER — ACETAMINOPHEN 325 MG/1
650 TABLET ORAL EVERY 4 HOURS PRN
Status: DISCONTINUED | OUTPATIENT
Start: 2017-04-11 | End: 2017-04-11 | Stop reason: HOSPADM

## 2017-04-11 RX ORDER — SODIUM CHLORIDE 9 MG/ML
INJECTION, SOLUTION INTRAVENOUS CONTINUOUS
Status: DISCONTINUED | OUTPATIENT
Start: 2017-04-11 | End: 2017-04-11 | Stop reason: HOSPADM

## 2017-04-11 RX ORDER — HYDROCODONE BITARTRATE AND ACETAMINOPHEN 5; 325 MG/1; MG/1
1 TABLET ORAL EVERY 4 HOURS PRN
Status: DISCONTINUED | OUTPATIENT
Start: 2017-04-11 | End: 2017-04-11 | Stop reason: HOSPADM

## 2017-04-11 RX ORDER — NITROGLYCERIN 0.4 MG/1
0.4 TABLET SUBLINGUAL
Status: DISCONTINUED | OUTPATIENT
Start: 2017-04-11 | End: 2017-04-11 | Stop reason: HOSPADM

## 2017-04-11 RX ADMIN — HYDROCODONE BITARTRATE AND ACETAMINOPHEN 1 TABLET: 5; 325 TABLET ORAL at 11:04

## 2017-04-11 RX ADMIN — SODIUM CHLORIDE: 9 INJECTION, SOLUTION INTRAVENOUS at 07:04

## 2017-04-11 NOTE — DISCHARGE SUMMARY
[facility]  Discharge Summary      Admit Date: 4/11/2017    Discharge Date and Time:  04/11/2017 10:03 AM    Attending Physician: Javi Casey MD     Reason for Admission: [facility]    Procedures Performed: Procedure(s) (LRB):  HEART CATH-LEFT (Left)    Hospital Course (synopsis of major diagnoses, care, treatment, and services provided during the course of the hospital stay):     4/11/17 [facility] - EDP 9, EF 60%, normal coronaries  Angioseal  Medical Rx     Consults: none    Significant Diagnostic Studies: Angiography:     Final Diagnoses:    Principal Problem: <principal problem not specified>   Secondary Diagnoses:   Active Hospital Problems    Diagnosis  POA    Chest pain [R07.9]  Yes      Resolved Hospital Problems    Diagnosis Date Resolved POA   No resolved problems to display.       Discharged Condition: good    Disposition: Home or Self Care    Follow Up/Patient Instructions:     Medications:  Reconciled Home Medications:   Current Discharge Medication List      CONTINUE these medications which have NOT CHANGED    Details   aspirin 81 MG Chew Take 1 tablet (81 mg total) by mouth once daily.  Refills: 0      metoprolol succinate (TOPROL-XL) 25 MG 24 hr tablet Take 1 tablet (25 mg total) by mouth once daily.  Qty: 30 tablet, Refills: 11      PROAIR HFA 90 mcg/actuation inhaler Inhale 1 puff into the lungs every 4 (four) hours as needed.   Refills: 0      SYMBICORT 160-4.5 mcg/actuation HFAA Inhale 2 puffs into the lungs once daily.   Refills: 0      tramadol (ULTRAM) 50 mg tablet Refills: 1      butalbital-acetaminophen-caffeine -40 mg (FIORICET, ESGIC) -40 mg per tablet Take 1-2 tablets by mouth every 4 to 6 hours as needed for Headaches.  Qty: 50 tablet, Refills: 1    Associated Diagnoses: Migraine without aura, with intractable migraine, so stated, without mention of status migrainosus      gabapentin (NEURONTIN) 300 MG capsule Take 300 mg by mouth continuous prn.   Refills: 3       hydrocodone-acetaminophen 10-325mg (NORCO)  mg Tab Refills: 0      LINZESS 290 mcg Cap TK ONE C PO QD ON EMPTY STOMACH  Refills: 2      naratriptan (AMERGE) 2.5 MG tablet Take 1 tablet (2.5 mg total) by mouth every 4 (four) hours as needed for Migraine. No more than 2 in 24 hours.  Qty: 9 tablet, Refills: 5    Associated Diagnoses: Intractable migraine without aura and without status migrainosus      NEXIUM 40 mg capsule Take 40 mg by mouth every other day.   Refills: 2      ondansetron (ZOFRAN) 4 MG tablet Take 1 tablet (4 mg total) by mouth every 6 (six) hours as needed for Nausea.  Qty: 12 tablet, Refills: 0      simvastatin (ZOCOR) 40 MG tablet Refills: 5           No discharge procedures on file.     Stop metoprolol and imdur  OV 1-2 weeks  No heavy lifting for 3-4 days  Cardiac diet

## 2017-04-11 NOTE — PROCEDURES
Procedures     [facility]   Dr Casey  Pre-op Dx CP  Post-op Dx same  Specimen none  EBL < 50 cc    4/11/17 [facility] - EDP 9, EF 60%, normal coronaries  Angioseal  Medical Rx

## 2017-04-11 NOTE — H&P
[facility] - Cardiology  History & Physical    Subjective:      Chief Complaint/Reason for Admission: [facility]    Sindhu Diaz is a 46 y.o. female.    Referred by Dr Webber for CP and pre-op clearance prior to GI scope     Admitted 12/2016  This is a 46-year-old female with Asthma who presents with shortness of breath with laying flat along with left-sided chest pain since night PTA. Says pain is in left precordial region, sharp,occurs with lying down and improves with sitting up. No radiation. Associated with SOB also with lying down. Requiring to raise head to sleep. No similar symptoms in the past. Patient denies cough, fever, or congestion. No abdominal pain. No history of kidney or heart disease. Has been having difficulty tying shoe lace for past few months sec to SOB and having intermittent swelling of legs for same time as per her. Says was drinking lot of Sodas 6 months ago and gained 30 LB then. Quit drinking sodas 3 months ago and recently lost those 30 pounds. Was admitted to r/o ACS.     Hospital Course:   Remained vitally stable here. 3 sets of C.E are wnl. Echo showed normal LVEF, no diastolic dysfunction and mild A.S. Initial CXR showed LLL opacity which was mostly a pneumonia. Was started on empiric oral antibiotics. Also received I.V Lasix x 2 for possible small pleural effusion associated with pneumonia. Repeat CXR this morning showed improvement. But patient is still c/o chest pain and SOB while lying down. No mention of this in Nursing notes and did not report this to them either. Vitally stable and no acute medical condition identified as discussed above. Her symptoms might still be sec to pneumonia. She is therefore being d/latrice home in a stable condition and is asked to complete ABx course. Will need yearly Echo for f/u of mild A.S. Patient aware of this.      Echo 12/14/16    1 - Normal left ventricular systolic function (EF 55-60%).     2 - Concentric remodeling.     3 - Normal left ventricular  diastolic function.     4 - Normal right ventricular systolic function .     5 - Mild aortic stenosis, NITZA = 1.99 cm2, mean gradient = 3.6 mmHg.     6 - Appears to possiblely be a bicuspid aortic valve.     7 - Mild mitral regurgitation.     8 - Trivial tricuspid regurgitation.     9 - The estimated PA systolic pressure is 15 mmHg.     Stress test 4/3/17  LVEF: 67 %  Impression: ABNORMAL MYOCARDIAL PERFUSION  1. There is evidence for moderate myocardial ischemia in the anterior wall of the left ventricle.   2. The perfusion scan is free of evidence for myocardial injury.   3. Resting wall motion is physiologic.   4. Resting LV function is normal.     Still with CP off and on         Past Medical History:   Diagnosis Date    Asthma     GERD (gastroesophageal reflux disease)     Hyperlipidemia     IBS (irritable bowel syndrome)     Vaginal delivery     x3     Past Surgical History:   Procedure Laterality Date    CHOLECYSTECTOMY      HYSTERECTOMY       Family History   Problem Relation Age of Onset    Heart attack Father      at 70     Lymphoma Mother     Heart disease Sister     Breast cancer Neg Hx     Colon cancer Neg Hx     Ovarian cancer Neg Hx      Social History   Substance Use Topics    Smoking status: Former Smoker     Years: 15.00     Types: Cigarettes     Quit date: 2002    Smokeless tobacco: Never Used      Comment: quit when she was 33 yrs old.    Alcohol use Yes      Comment: rarely       PTA Medications   Medication Sig    aspirin 81 MG Chew Take 1 tablet (81 mg total) by mouth once daily.    metoprolol succinate (TOPROL-XL) 25 MG 24 hr tablet Take 1 tablet (25 mg total) by mouth once daily.    PROAIR HFA 90 mcg/actuation inhaler Inhale 1 puff into the lungs every 4 (four) hours as needed.     SYMBICORT 160-4.5 mcg/actuation HFAA Inhale 2 puffs into the lungs once daily.     tramadol (ULTRAM) 50 mg tablet     butalbital-acetaminophen-caffeine -40 mg (FIORICET, ESGIC) -40  mg per tablet Take 1-2 tablets by mouth every 4 to 6 hours as needed for Headaches.    gabapentin (NEURONTIN) 300 MG capsule Take 300 mg by mouth continuous prn.     hydrocodone-acetaminophen 10-325mg (NORCO)  mg Tab     LINZESS 290 mcg Cap TK ONE C PO QD ON EMPTY STOMACH    naratriptan (AMERGE) 2.5 MG tablet Take 1 tablet (2.5 mg total) by mouth every 4 (four) hours as needed for Migraine. No more than 2 in 24 hours.    NEXIUM 40 mg capsule Take 40 mg by mouth every other day.     ondansetron (ZOFRAN) 4 MG tablet Take 1 tablet (4 mg total) by mouth every 6 (six) hours as needed for Nausea.    simvastatin (ZOCOR) 40 MG tablet      Review of patient's allergies indicates:  No Known Allergies     Review of Systems   All other systems reviewed and are negative.      Objective:      Vital Signs (Most Recent)  Temp: 97.8 °F (36.6 °C) (04/11/17 0800)  Pulse: 63 (04/11/17 0800)  Resp: 18 (04/11/17 0800)  BP: 130/69 (04/11/17 0800)  SpO2: 98 % (04/11/17 0800)    Vital Signs Range (Last 24H):  [unfilled]    Physical Exam   Constitutional: She is oriented to person, place, and time. She appears well-developed and well-nourished.   HENT:   Head: Normocephalic and atraumatic.   Eyes: EOM are normal. Pupils are equal, round, and reactive to light.   Neck: Normal range of motion. Neck supple.   Cardiovascular: Normal rate and regular rhythm.    Pulmonary/Chest: Effort normal and breath sounds normal.   Abdominal: Soft. Bowel sounds are normal.   Musculoskeletal: Normal range of motion.   Neurological: She is alert and oriented to person, place, and time.   Skin: Skin is warm and dry.       Data Review:    CBC:   Lab Results   Component Value Date    WBC 4.42 04/10/2017    RBC 4.42 04/10/2017    HGB 12.3 04/10/2017    HCT 37.5 04/10/2017     04/10/2017     BMP:   Lab Results   Component Value Date    GLU 94 04/10/2017     04/10/2017    K 4.2 04/10/2017     04/10/2017    CO2 28 04/10/2017    BUN  9 04/10/2017    CREATININE 0.8 04/10/2017    CALCIUM 9.3 04/10/2017      ECG: normal sinus rhythm, no blocks or conduction defects, no ischemic changes.     Assessment:      Active Hospital Problems    Diagnosis  POA    Chest pain [R07.9]  Yes      Resolved Hospital Problems    Diagnosis Date Resolved POA   No resolved problems to display.    CP with anterior ischemia on stress test. On 2 anti-anginal medications    Plan:      [facility]

## 2017-04-11 NOTE — IP AVS SNAPSHOT
[place] [address] [place] [address]  Phone: [phone]           Patient Discharge Instructions   Our goal is to set you up for success. This packet includes information on your condition, medications, and your home care.  It will help you care for yourself to prevent having to return to the hospital.     Please ask your nurse if you have any questions.      There are many details to remember when preparing to leave the hospital. Here is what you will need to do:    1. Take your medicine. If you are prescribed medications, review your Medication List on the following pages. You may have new medications to  at the pharmacy and others that you'll need to stop taking. Review the instructions for how and when to take your medications. Talk with your doctor or nurses if you are unsure of what to do.     2. Go to your follow-up appointments. Specific follow-up information is listed in the following pages. Your may be contacted by a nurse or clinical provider about future appointments. Be sure we have all of the phone numbers to reach you. Please contact your provider's office if you are unable to make an appointment.     3. Watch for warning signs. Your doctor or nurse will give you detailed warning signs to watch for and when to call for assistance. These instructions may also include educational information about your condition. If you experience any of warning signs to your health, call your doctor.           [facility]  Unless otherwise directed by your provider, please   contact [facility], our nurse care line   that is available for 24/7 assistance.     [phone] (toll-free)     Registered nurses in the [facility]   provide: appointment scheduling, clinical advisement, health education, and other advisory services.                  ** Verify the list of medication(s) below is accurate and up to date. Carry this with you in case of emergency.  If your medications have changed, please notify your healthcare provider.             Medication List      CONTINUE taking these medications        Additional Info                      aspirin 81 MG Chew   Refills:  0   Dose:  81 mg    Instructions:  Take 1 tablet (81 mg total) by mouth once daily.     Begin Date    AM    Noon    PM    Bedtime       butalbital-acetaminophen-caffeine -40 mg -40 mg per tablet   Commonly known as:  FIORICET, ESGIC   Quantity:  50 tablet   Refills:  1   Dose:  1-2 tablet    Instructions:  Take 1-2 tablets by mouth every 4 to 6 hours as needed for Headaches.     Begin Date    AM    Noon    PM    Bedtime       gabapentin 300 MG capsule   Commonly known as:  NEURONTIN   Refills:  3   Dose:  300 mg    Instructions:  Take 300 mg by mouth continuous prn.     Begin Date    AM    Noon    PM    Bedtime       hydrocodone-acetaminophen 10-325mg  mg Tab   Commonly known as:  NORCO   Refills:  0      Begin Date    AM    Noon    PM    Bedtime       LINZESS 290 mcg Cap   Refills:  2   Generic drug:  linaclotide    Instructions:  TK ONE C PO QD ON EMPTY STOMACH     Begin Date    AM    Noon    PM    Bedtime       naratriptan 2.5 MG tablet   Commonly known as:  AMERGE   Quantity:  9 tablet   Refills:  5   Dose:  2.5 mg    Instructions:  Take 1 tablet (2.5 mg total) by mouth every 4 (four) hours as needed for Migraine. No more than 2 in 24 hours.     Begin Date    AM    Noon    PM    Bedtime       NEXIUM 40 MG capsule   Refills:  2   Dose:  40 mg   Generic drug:  esomeprazole    Instructions:  Take 40 mg by mouth every other day.     Begin Date    AM    Noon    PM    Bedtime       ondansetron 4 MG tablet   Commonly known as:  ZOFRAN   Quantity:  12 tablet   Refills:  0   Dose:  4 mg    Instructions:  Take 1 tablet (4 mg total) by mouth every 6 (six) hours as needed for Nausea.     Begin Date    AM    Noon    PM    Bedtime       PROAIR HFA 90 mcg/actuation inhaler   Refills:  0   Dose:  1  puff   Generic drug:  albuterol    Instructions:  Inhale 1 puff into the lungs every 4 (four) hours as needed.     Begin Date    AM    Noon    PM    Bedtime       simvastatin 40 MG tablet   Commonly known as:  ZOCOR   Refills:  5      Begin Date    AM    Noon    PM    Bedtime       SYMBICORT 160-4.5 mcg/actuation Hfaa   Refills:  0   Dose:  2 puff   Generic drug:  budesonide-formoterol 160-4.5 mcg    Instructions:  Inhale 2 puffs into the lungs once daily.     Begin Date    AM    Noon    PM    Bedtime       tramadol 50 mg tablet   Commonly known as:  ULTRAM   Refills:  1      Begin Date    AM    Noon    PM    Bedtime         STOP taking these medications     metoprolol succinate 25 MG 24 hr tablet   Commonly known as:  TOPROL-XL                  Please bring to all follow up appointments:    1. A copy of your discharge instructions.  2. All medicines you are currently taking in their original bottles.  3. Identification and insurance card.    Please arrive 15 minutes ahead of scheduled appointment time.    Please call 24 hours in advance if you must reschedule your appointment and/or time.        Your Scheduled Appointments     Apr 25, 2017  1:30 PM CDT   Post OP with Javi Casey MD   [facility] - Cardiology ([facility])    [address] [facility] [address]  [place] [id number] [phone]                Discharge Instructions     Future Orders    Call MD for:  difficulty breathing, headache or visual disturbances     Call MD for:  hives     Call MD for:  redness, tenderness, or signs of infection (pain, swelling, redness, odor or green/yellow discharge around incision site)     Call MD for:  severe uncontrolled pain     Call MD for:  temperature >100.4     Diet general     Questions:    Total calories:      Fat restriction, if any:      Protein restriction, if any:      Na restriction, if any:      Fluid restriction:      Additional restrictions:          Discharge Instructions        Vascular Closure  Device     -Re apply a clean, dry band aid and every day for five days or until a scab has formed at the site.  Change the band aid as needed  -Keep the site dry and clean.  -You may shower 24 hours after the procedure, but do not bathe or use a pool until the wound had completely closed.  -Gently clean your puncture site with soap and warm water.  -After showering , gently pat-dry the site with a clean towel; then let the site air dry before covering with a band aid  -Limit tight fitting clothes or underwear that my irritate the puncture site until the site has healed.    Daily Activities    Do not drive, drink alcohol, or sign legal documents for 24 hours, or if taking narcotic pain medication.    Day of discharge  -No driving  Modify activity for 3-5 days  -No heavy lifting of anything over 5 pounds  (equivalent to a 1/2 gallon of milk)  -No pushing or pulling.  -No vigorous activity or straining  -Avoid stairs unless necessary : if necessary, take them slowly.  -Coughing, sneezing, or straining for a bowel movement:  Support your groin by pressing with your palm on top of the dressing/bandage.  -Sexual activity : check with your doctor  -No strenuous exercise.  -Avoid driving unless necessary.  Talk to your doctor about returning back to work, which depends on your type of work., your procedure and any medication you might be taking.    Fall Prevention  Millions of people fall every year and injure themselves. You may have had anesthesia or sedation which may increase your risk of falling. You may have health issues that put you at an increased risk of falling.     Here are ways to reduce your risk of falling.  ·   · Make your home safe by keeping walkways clear of objects you may trip over.  · Use non-slip pads under rugs. Do not use area rugs or small throw rugs.  · Use non-slip mats in bathtubs and showers.  · Install handrails and lights on staircases.  · Do not walk in poorly lit areas.  · Do not stand on  "chairs or wobbly ladders.  · Use caution when reaching overhead or looking upward. This position can cause a loss of balance.  · Be sure your shoes fit properly, have non-slip bottoms and are in good condition.   · Wear shoes both inside and out. Avoid going barefoot or wearing slippers.  · Be cautious when going up and down stairs, curbs, and when walking on uneven sidewalks.  · If your balance is poor, consider using a cane or walker.  · If your fall was related to alcohol use, stop or limit alcohol intake.   · If your fall was related to use of sleeping medicines, talk to your doctor about this. You may need to reduce your dosage at bedtime if you awaken during the night to go to the bathroom.    · To reduce the need for nighttime bathroom trips:  ¨ Avoid drinking fluids for several hours before going to bed  ¨ Empty your bladder before going to bed  · Stay as active as you can. Balance, flexibility, strength, and endurance all come from exercise. They all play a role in preventing falls. Ask your healthcare provider which types of activity are right for you.  · Get your vision checked on a regular basis.  · If you have pets, know where they are before you stand up or walk so you don't trip over them.  · Use night lights.          Primary Diagnosis     Your primary diagnosis was:  Chest Pain      Admission Information     Date & Time Provider Department CSN    4/11/2017  7:07 AM Javi Casey MD [facility] [id number]      Care Providers     Provider Role Specialty Primary office phone    Javi Casey MD Attending Provider Cardiology [phone]    Javi Casey MD Surgeon  Cardiology [phone]      Your Vitals Were     BP Pulse Temp Resp Height Weight    130/69 (BP Location: Right arm, Patient Position: Lying, BP Method: Automatic) 63 97.8 °F (36.6 °C) (Oral) 18 5' 11" (1.803 m) 96.8 kg (213 lb 6 oz)    Last Period SpO2 BMI          (LMP Unknown) 98% 29.76 kg/m2        Recent Lab " Values        2/21/2017                          12:05 PM           A1C 5.6           Comment for A1C at 12:05 PM on 2/21/2017:  According to ADA guidelines, hemoglobin A1C <7.0% represents  optimal control in non-pregnant diabetic patients.  Different  metrics may apply to specific populations.   Standards of Medical Care in Diabetes - 2016.  For the purpose of screening for the presence of diabetes:  <5.7%     Consistent with the absence of diabetes  5.7-6.4%  Consistent with increasing risk for diabetes   (prediabetes)  >or=6.5%  Consistent with diabetes  Currently no consensus exists for use of hemoglobin A1C  for diagnosis of diabetes for children.        Allergies as of 4/11/2017     No Known Allergies      Advance Directives     An advance directive is a document which, in the event you are no longer able to make decisions for yourself, tells your healthcare team what kind of treatment you do or do not want to receive, or who you would like to make those decisions for you.  If you do not currently have an advance directive, [facility] encourages you to create one.  For more information call:  [phone]), [phone] ([phone]),  or log on to [email or web address].        Smoking Cessation     If you would like to quit smoking:   You may be eligible for free services if you are a [place] resident and started smoking cigarettes before September 1, 1988.  Call the Smoking Cessation Trust ([facility]) toll free at (700) 098-8374 or (132) 888-5077.   Call 4-800-QUIT-NOW if you do not meet the above criteria.   Contact us via email: [email or web address]   View our website for more information: [email or web address]        Language Assistance Services     ATTENTION: Language assistance services are available, free of charge. Please call [phone].      ATENCIÓN: Si habla español, tiene a mata disposición servicios gratuitos de asistencia lingüística. Llame al [phone].     [facility]  Ý: N?u b?n nói Ti?ng Vi?t, có các d?ch v? h? tr? ngôn ng? mi?n phí dành cho b?n. G?i s? [phone].        Pneumonmia Discharge Instructions                [facility] Sign-Up     Activating your [facility] account is as easy as 1-2-3!     1) Visit [email or web address], select Sign Up Now, enter this activation code and your date of birth, then select Next.  Activation code not generated  Current Patient Portal Status: Account disabled      2) Create a username and password to use when you visit [facility] in the future and select a security question in case you lose your password and select Next.    3) Enter your e-mail address and click Sign Up!    Additional Information  If you have questions, please e-mail [email or web address] or call [phone] to talk to our [facility] staff. Remember, [facility] is NOT to be used for urgent needs. For medical emergencies, dial 911.          [facility] complies with applicable Federal civil rights laws and does not discriminate on the basis of race, color, national origin, age, disability, or sex.

## 2017-04-11 NOTE — DISCHARGE INSTRUCTIONS
Vascular Closure Device     -Re apply a clean, dry band aid and every day for five days or until a scab has formed at the site.  Change the band aid as needed  -Keep the site dry and clean.  -You may shower 24 hours after the procedure, but do not bathe or use a pool until the wound had completely closed.  -Gently clean your puncture site with soap and warm water.  -After showering , gently pat-dry the site with a clean towel; then let the site air dry before covering with a band aid  -Limit tight fitting clothes or underwear that my irritate the puncture site until the site has healed.    Daily Activities    Do not drive, drink alcohol, or sign legal documents for 24 hours, or if taking narcotic pain medication.    Day of discharge  -No driving  Modify activity for 3-5 days  -No heavy lifting of anything over 5 pounds  (equivalent to a 1/2 gallon of milk)  -No pushing or pulling.  -No vigorous activity or straining  -Avoid stairs unless necessary : if necessary, take them slowly.  -Coughing, sneezing, or straining for a bowel movement:  Support your groin by pressing with your palm on top of the dressing/bandage.  -Sexual activity : check with your doctor  -No strenuous exercise.  -Avoid driving unless necessary.  Talk to your doctor about returning back to work, which depends on your type of work., your procedure and any medication you might be taking.    Fall Prevention  Millions of people fall every year and injure themselves. You may have had anesthesia or sedation which may increase your risk of falling. You may have health issues that put you at an increased risk of falling.     Here are ways to reduce your risk of falling.  ·   · Make your home safe by keeping walkways clear of objects you may trip over.  · Use non-slip pads under rugs. Do not use area rugs or small throw rugs.  · Use non-slip mats in bathtubs and showers.  · Install handrails and lights on staircases.  · Do not walk in poorly lit  areas.  · Do not stand on chairs or wobbly ladders.  · Use caution when reaching overhead or looking upward. This position can cause a loss of balance.  · Be sure your shoes fit properly, have non-slip bottoms and are in good condition.   · Wear shoes both inside and out. Avoid going barefoot or wearing slippers.  · Be cautious when going up and down stairs, curbs, and when walking on uneven sidewalks.  · If your balance is poor, consider using a cane or walker.  · If your fall was related to alcohol use, stop or limit alcohol intake.   · If your fall was related to use of sleeping medicines, talk to your doctor about this. You may need to reduce your dosage at bedtime if you awaken during the night to go to the bathroom.    · To reduce the need for nighttime bathroom trips:  ¨ Avoid drinking fluids for several hours before going to bed  ¨ Empty your bladder before going to bed  · Stay as active as you can. Balance, flexibility, strength, and endurance all come from exercise. They all play a role in preventing falls. Ask your healthcare provider which types of activity are right for you.  · Get your vision checked on a regular basis.  · If you have pets, know where they are before you stand up or walk so you don't trip over them.  · Use night lights.

## 2017-04-25 ENCOUNTER — OFFICE VISIT (OUTPATIENT)
Dept: CARDIOLOGY | Facility: CLINIC | Age: 47
End: 2017-04-25
Payer: MEDICARE

## 2017-04-25 VITALS
HEIGHT: 71 IN | BODY MASS INDEX: 29.68 KG/M2 | SYSTOLIC BLOOD PRESSURE: 127 MMHG | HEART RATE: 81 BPM | DIASTOLIC BLOOD PRESSURE: 60 MMHG | WEIGHT: 212 LBS | OXYGEN SATURATION: 96 %

## 2017-04-25 DIAGNOSIS — I35.0 AORTIC VALVE STENOSIS, UNSPECIFIED ETIOLOGY: ICD-10-CM

## 2017-04-25 DIAGNOSIS — R07.9 CHEST PAIN, UNSPECIFIED TYPE: Primary | ICD-10-CM

## 2017-04-25 DIAGNOSIS — J45.909 UNCOMPLICATED ASTHMA, UNSPECIFIED ASTHMA SEVERITY: ICD-10-CM

## 2017-04-25 PROCEDURE — 99999 PR PBB SHADOW E&M-EST. PATIENT-LVL III: CPT | Mod: PBBFAC,,, | Performed by: INTERNAL MEDICINE

## 2017-04-25 PROCEDURE — 99213 OFFICE O/P EST LOW 20 MIN: CPT | Mod: S$PBB,,, | Performed by: INTERNAL MEDICINE

## 2017-04-25 PROCEDURE — 99213 OFFICE O/P EST LOW 20 MIN: CPT | Mod: PBBFAC | Performed by: INTERNAL MEDICINE

## 2017-04-25 RX ORDER — METOPROLOL SUCCINATE 25 MG/1
25 TABLET, EXTENDED RELEASE ORAL DAILY
Qty: 30 TABLET | Refills: 11 | Status: SHIPPED | OUTPATIENT
Start: 2017-04-25 | End: 2020-08-24 | Stop reason: SDUPTHER

## 2017-04-25 NOTE — PROGRESS NOTES
Subjective:    Patient ID:  Sindhu Diaz is a 46 y.o. female who presents for follow-up of Post-op Evaluation      HPI     4/11/17 [facility] - EDP 9, EF 60%, normal coronaries    Echo 12/14/16    1 - Normal left ventricular systolic function (EF 55-60%).     2 - Concentric remodeling.     3 - Normal left ventricular diastolic function.     4 - Normal right ventricular systolic function .     5 - Mild aortic stenosis, NITZA = 1.99 cm2, mean gradient = 3.6 mmHg.     6 - Appears to possiblely be a bicuspid aortic valve.     7 - Mild mitral regurgitation.     8 - Trivial tricuspid regurgitation.     9 - The estimated PA systolic pressure is 15 mmHg.      Stress test 4/3/17  LVEF: 67 %  Impression: ABNORMAL MYOCARDIAL PERFUSION  1. There is evidence for moderate myocardial ischemia in the anterior wall of the left ventricle.   2. The perfusion scan is free of evidence for myocardial injury.   3. Resting wall motion is physiologic.   4. Resting LV function is normal.     Denies CP or SOB  Said she feels better taking metoprolol      Review of Systems   Constitution: Negative for decreased appetite.   HENT: Negative for ear discharge.    Eyes: Negative for blurred vision.   Respiratory: Negative for hemoptysis.    Endocrine: Negative for polyphagia.   Hematologic/Lymphatic: Negative for adenopathy.   Skin: Negative for color change.   Musculoskeletal: Negative for joint swelling.   Neurological: Negative for brief paralysis.   Psychiatric/Behavioral: Negative for hallucinations.        Objective:    Physical Exam   Constitutional: She is oriented to person, place, and time. She appears well-developed and well-nourished.   HENT:   Head: Normocephalic and atraumatic.   Eyes: Conjunctivae are normal. Pupils are equal, round, and reactive to light.   Neck: Normal range of motion. Neck supple.   Cardiovascular: Normal rate, normal heart sounds and intact distal pulses.    Pulmonary/Chest: Effort normal and breath sounds normal.    Abdominal: Soft. Bowel sounds are normal.   Musculoskeletal: Normal range of motion.   Neurological: She is alert and oriented to person, place, and time.   Skin: Skin is warm and dry.         Assessment:       1. Chest pain, unspecified type    2. Uncomplicated asthma, unspecified asthma severity    3. Aortic valve stenosis, unspecified etiology         Plan:       Cardiac stable  OV 1 year with repeat echo

## 2017-04-25 NOTE — MR AVS SNAPSHOT
[facility] Cardiology  [address] [place] [address]  Phone: [phone]                  Sindhu Diaz   2017 1:30 PM   Office Visit    Description:  Female : 1970   Provider:  Javi Casey MD   Department:  [facility]           Reason for Visit     Post-op Evaluation                To Do List           Future Appointments        Provider Department Dept Phone    2017 1:30 PM Javi Casey MD [facility] [phone]      Goals (5 Years of Data)     None      [facility]     [facility]  [facility]istance  Unless otherwise directed by your provider, please contact [facility], our nurse care line that is available for  assistance.     Registered nurses in the [facility] provide: appointment scheduling, clinical advisement, health education, and other advisory services.  Call: [phone] (toll free)               Medications           Message regarding Medications     Verify the changes and/or additions to your medication regime listed below are the same as discussed with your clinician today.  If any of these changes or additions are incorrect, please notify your healthcare provider.             Verify that the below list of medications is an accurate representation of the medications you are currently taking.  If none reported, the list may be blank. If incorrect, please contact your healthcare provider. Carry this list with you in case of emergency.           Current Medications     aspirin 81 MG Chew Take 1 tablet (81 mg total) by mouth once daily.    butalbital-acetaminophen-caffeine -40 mg (FIORICET, ESGIC) -40 mg per tablet Take 1-2 tablets by mouth every 4 to 6 hours as needed for Headaches.    gabapentin (NEURONTIN) 300 MG capsule Take 300 mg by mouth continuous prn.     hydrocodone-acetaminophen 10-325mg (NORCO)  mg Tab     LINZESS 290 mcg Cap TK ONE C PO QD ON EMPTY STOMACH  "   naratriptan (AMERGE) 2.5 MG tablet Take 1 tablet (2.5 mg total) by mouth every 4 (four) hours as needed for Migraine. No more than 2 in 24 hours.    NEXIUM 40 mg capsule Take 40 mg by mouth every other day.     ondansetron (ZOFRAN) 4 MG tablet Take 1 tablet (4 mg total) by mouth every 6 (six) hours as needed for Nausea.    PROAIR HFA 90 mcg/actuation inhaler Inhale 1 puff into the lungs every 4 (four) hours as needed.     simvastatin (ZOCOR) 40 MG tablet     SYMBICORT 160-4.5 mcg/actuation HFAA Inhale 2 puffs into the lungs once daily.     tramadol (ULTRAM) 50 mg tablet            Clinical Reference Information           Your Vitals Were     BP Pulse Height Weight Last Period SpO2    127/60 81 5' 11" (1.803 m) 96.2 kg (212 lb) (LMP Unknown) 96%    BMI                29.57 kg/m2          Blood Pressure          Most Recent Value    BP  127/60      Allergies as of 4/25/2017     No Known Allergies      Immunizations Administered on Date of Encounter - 4/25/2017     None      [facility] Sign-Up     Activating your [facility] account is as easy as 1-2-3!     1) Visit [email or web address], select Sign Up Now, enter this activation code and your date of birth, then select Next.  Activation code not generated  Current Patient Portal Status: Account disabled      2) Create a username and password to use when you visit [facility] in the future and select a security question in case you lose your password and select Next.    3) Enter your e-mail address and click Sign Up!    Additional Information  If you have questions, please e-mail [email or web address] or call [phone] to talk to our [facility] staff. Remember, [facility] is NOT to be used for urgent needs. For medical emergencies, dial 911.         Language Assistance Services     ATTENTION: Language assistance services are available, free of charge. Please call [phone].      ATENCIÓN: Si habla español, tiene a mata disposición servicios gratuitos de asistencia " lingüística. Luke al [phone].     GUY Ý: N?u b?n nói Ti?ng Vi?t, có các d?ch v? h? tr? ngôn ng? mi?n phí dành cho b?n. G?i s? [phone].         [facility] complies with applicable Federal civil rights laws and does not discriminate on the basis of race, color, national origin, age, disability, or sex.

## 2017-07-21 ENCOUNTER — OFFICE VISIT (OUTPATIENT)
Dept: NEUROLOGY | Facility: CLINIC | Age: 47
End: 2017-07-21
Payer: MEDICARE

## 2017-07-21 VITALS
DIASTOLIC BLOOD PRESSURE: 75 MMHG | BODY MASS INDEX: 29.87 KG/M2 | SYSTOLIC BLOOD PRESSURE: 128 MMHG | HEART RATE: 71 BPM | HEIGHT: 71 IN | WEIGHT: 213.38 LBS

## 2017-07-21 DIAGNOSIS — G44.40 MEDICATION OVERUSE HEADACHE: ICD-10-CM

## 2017-07-21 DIAGNOSIS — G43.019 INTRACTABLE MIGRAINE WITHOUT AURA AND WITHOUT STATUS MIGRAINOSUS: ICD-10-CM

## 2017-07-21 DIAGNOSIS — R51.9 PERSISTENT HEADACHES: ICD-10-CM

## 2017-07-21 DIAGNOSIS — G47.19 EXCESSIVE DAYTIME SLEEPINESS: Primary | ICD-10-CM

## 2017-07-21 PROCEDURE — 99999 PR PBB SHADOW E&M-EST. PATIENT-LVL III: CPT | Mod: PBBFAC,,, | Performed by: PSYCHIATRY & NEUROLOGY

## 2017-07-21 PROCEDURE — 99213 OFFICE O/P EST LOW 20 MIN: CPT | Mod: PBBFAC,PO | Performed by: PSYCHIATRY & NEUROLOGY

## 2017-07-21 PROCEDURE — 99214 OFFICE O/P EST MOD 30 MIN: CPT | Mod: S$PBB,,, | Performed by: PSYCHIATRY & NEUROLOGY

## 2017-07-21 RX ORDER — VENLAFAXINE HYDROCHLORIDE 37.5 MG/1
37.5 CAPSULE, EXTENDED RELEASE ORAL DAILY
Qty: 30 CAPSULE | Refills: 11 | Status: SHIPPED | OUTPATIENT
Start: 2017-07-21 | End: 2017-12-29

## 2017-07-21 RX ORDER — SUMATRIPTAN 50 MG/1
TABLET, FILM COATED ORAL
Qty: 12 TABLET | Refills: 3 | Status: SHIPPED | OUTPATIENT
Start: 2017-07-21 | End: 2018-09-10 | Stop reason: SDUPTHER

## 2017-07-21 NOTE — ASSESSMENT & PLAN NOTE
-- patient counseled to discontinue Goody's powder  -- patient counseled on appropriate OTC analgesic use

## 2017-07-21 NOTE — PATIENT INSTRUCTIONS

## 2017-07-21 NOTE — PROGRESS NOTES
[facility] NEUROLOGY  [facility] [place]    Date: July 21, 2017   Patient Name: Sindhu Diaz   MRN: [id number]   PCP: ADRIANA Carson  Referring Provider: Self, Aaareferral    Assessment:   Sindhu Diaz is a 46 y.o. female presenting with chronic headaches.  Counseled patient extensively on appropriate use of OTC analgesics.  She expressed understanding of this. Counseled patient to discontinue BC powder use.  She expressed understanding of this.  We will restart Effexor for headache prophylaxis with Imitrex as needed for breakthrough.  Additionally, given the patient's persistent,, chronic daily headaches on waking orthopnea, have referred patient to sleep medicine for evaluation of possible underlying obstructive apnea.  Plan:     Problem List Items Addressed This Visit        Neuro    Intractable migraine without aura    Current Assessment & Plan     -- start effexor 37.5 mg daily  -- patient asked to keep a headache diary  -- imitrex prn           Medication overuse headache    Current Assessment & Plan     -- patient counseled to discontinue Goody's powder  -- patient counseled on appropriate OTC analgesic use           Other Visit Diagnoses     Excessive daytime sleepiness    -  Primary    Relevant Orders    Ambulatory consult to Sleep Disorders    Persistent headaches              Regino Echeverria MD  [facility]   Department of Neurology    Patient note was created using Dragon Dictation.  Any errors in syntax or even information may not have been identified and edited on initial review prior to signing this note.  Subjective:        HPI:   Ms. Sindhu Diaz is a 46 y.o. female who presents with a chief complaint of chronic headaches.  The patient reports that she has been waking with a bandlike tension type headache 3-4 mornings every week for the last several months.  She states that most recently, she has been taking Goody's powder 3 times daily every day.  She does admit to  migraines associated with photophobia, phonopobia, nausea, and vomiting in the past, which she states she is to be controlled with Effexor, but states that this was discontinued in favor of amitriptyline, which she did not tolerate.  She does endorse numerous psychosocial stressors and expresses an interest in restarting Effexor.  Additionally, she admits to excessive daytime sleepiness.  She states that she snores and has 3 pillow orthopnea.  She states she has never been evaluated for possible sleep apnea.    PAST MEDICAL HISTORY:  Past Medical History:   Diagnosis Date    Asthma     GERD (gastroesophageal reflux disease)     Hyperlipidemia     IBS (irritable bowel syndrome)     Tension headache, chronic     Vaginal delivery     x3     PAST SURGICAL HISTORY:  Past Surgical History:   Procedure Laterality Date    CHOLECYSTECTOMY      HYSTERECTOMY       CURRENT MEDS:  Current Outpatient Prescriptions   Medication Sig Dispense Refill    aspirin 81 MG Chew Take 1 tablet (81 mg total) by mouth once daily.  0    gabapentin (NEURONTIN) 300 MG capsule Take 300 mg by mouth continuous prn.   3    hydrocodone-acetaminophen 10-325mg (NORCO)  mg Tab   0    LINZESS 290 mcg Cap TK ONE C PO QD ON EMPTY STOMACH  2    metoprolol succinate (TOPROL-XL) 25 MG 24 hr tablet Take 1 tablet (25 mg total) by mouth once daily. 30 tablet 11    ondansetron (ZOFRAN) 4 MG tablet Take 1 tablet (4 mg total) by mouth every 6 (six) hours as needed for Nausea. 12 tablet 0    PROAIR HFA 90 mcg/actuation inhaler Inhale 1 puff into the lungs every 4 (four) hours as needed.   0    simvastatin (ZOCOR) 40 MG tablet   5    SYMBICORT 160-4.5 mcg/actuation HFAA Inhale 2 puffs into the lungs once daily.   0    tramadol (ULTRAM) 50 mg tablet   1    NEXIUM 40 mg capsule Take 40 mg by mouth every other day.   2    sumatriptan (IMITREX) 50 MG tablet May repeat once after 2 hours if needed. Do not exceed 4 tabs in 1 week 12 tablet 3     "venlafaxine (EFFEXOR-XR) 37.5 MG 24 hr capsule Take 1 capsule (37.5 mg total) by mouth once daily. 30 capsule 11     No current facility-administered medications for this visit.      ALLERGIES:  Review of patient's allergies indicates:  No Known Allergies    FAMILY HISTORY:  Family History   Problem Relation Age of Onset    Heart attack Father      at 70     Lymphoma Mother     Heart disease Sister     Breast cancer Neg Hx     Colon cancer Neg Hx     Ovarian cancer Neg Hx        SOCIAL HISTORY:  Social History   Substance Use Topics    Smoking status: Former Smoker     Years: 15.00     Types: Cigarettes     Quit date: 2002    Smokeless tobacco: Never Used      Comment: quit when she was 33 yrs old.    Alcohol use Yes      Comment: rarely       Review of Systems:  12 review of systems is negative except for the symptoms mentioned in HPI.      Objective:     Vitals:    07/21/17 1341   BP: 128/75   Pulse: 71   Weight: 96.8 kg (213 lb 6.5 oz)   Height: 5' 11" (1.803 m)     General: NAD, well nourished   Eyes: no tearing, discharge, no erythema   ENT: moist mucous membranes of the oral cavity, nares patent    Neck: Supple, full range of motion  Cardiovascular: Warm and well perfused, pulses equal and symmetrical  Lungs: Normal work of breathing, normal chest wall excursions  Skin: No rash, lesions, or breakdown on exposed skin  Psychiatry: Mood and affect are appropriate   Abdomen: soft, non tender, non distended  Extremeties: No cyanosis, clubbing or edema.    Neurological   MENTAL STATUS: Alert and oriented to person, place, and time. Attention and concentration within normal limits. Speech without dysarthria, able to name and repeat without difficulty. Recent and remote memory within normal limits   CRANIAL NERVES: Visual fields intact. PERRL. EOMI. Facial sensation intact. Face symmetrical. Hearing grossly intact. Full shoulder shrug bilaterally. Tongue protrudes midline   SENSORY: Sensation is intact to " light touch throughout.   MOTOR: Normal bulk and tone. 5/5 deltoid, biceps, triceps, interosseous, hand  bilaterally. 5/5 iliopsoas, knee extension/flexion, foot dorsi/plantarflexion bilaterally.    REFLEXES: Symmetric and 2+ throughout.  CEREBELLAR/COORDINATION/GAIT: Gait steady with normal arm swing and stride length.  Heel to shin intact. Finger to nose intact. Normal rapid alternating movements.

## 2017-08-25 ENCOUNTER — OFFICE VISIT (OUTPATIENT)
Dept: SLEEP MEDICINE | Facility: CLINIC | Age: 47
End: 2017-08-25
Payer: MEDICARE

## 2017-08-25 VITALS
DIASTOLIC BLOOD PRESSURE: 66 MMHG | HEART RATE: 66 BPM | WEIGHT: 203.25 LBS | BODY MASS INDEX: 28.35 KG/M2 | SYSTOLIC BLOOD PRESSURE: 106 MMHG

## 2017-08-25 DIAGNOSIS — G47.30 SLEEP APNEA, UNSPECIFIED TYPE: Primary | ICD-10-CM

## 2017-08-25 PROCEDURE — 99999 PR PBB SHADOW E&M-EST. PATIENT-LVL II: CPT | Mod: PBBFAC,,, | Performed by: PSYCHIATRY & NEUROLOGY

## 2017-08-25 PROCEDURE — 99214 OFFICE O/P EST MOD 30 MIN: CPT | Mod: S$PBB,,, | Performed by: PSYCHIATRY & NEUROLOGY

## 2017-08-25 PROCEDURE — 99212 OFFICE O/P EST SF 10 MIN: CPT | Mod: PBBFAC | Performed by: PSYCHIATRY & NEUROLOGY

## 2017-08-25 RX ORDER — OXYBUTYNIN CHLORIDE 15 MG/1
TABLET, EXTENDED RELEASE ORAL
Refills: 0 | COMMUNITY
Start: 2017-06-22

## 2017-08-25 RX ORDER — THEOPHYLLINE 300 MG/1
TABLET, EXTENDED RELEASE ORAL
Refills: 0 | Status: ON HOLD | COMMUNITY
Start: 2017-06-22 | End: 2018-08-17

## 2017-08-25 NOTE — LETTER
August 25, 2017      Regino Echeverria MD  [address]  [address]  [facility] [address]           [facility]  [address]  [facility] [address]  Phone: [phone]          Patient: Sindhu Diaz   MR Number: [id number]   YOB: 1970   Date of Visit: 8/25/2017       Dear Dr. Regino Echeverria:    Thank you for referring Sindhu Diaz to me for evaluation. Attached you will find relevant portions of my assessment and plan of care.    If you have questions, please do not hesitate to call me. I look forward to following Sindhu Diaz along with you.    Sincerely,    Quique Burton MD    Enclosure  CC:  No Recipients    If you would like to receive this communication electronically, please contact [email or web address] or [phone] to request more information on [employer] Link access.    For providers and/or their staff who would like to refer a patient to [facility], please contact us through our one-stop-shop provider referral line, [facility], at [phone].    If you feel you have received this communication in error or would no longer like to receive these types of communications, please e-mail [email or web address]

## 2017-08-25 NOTE — PATIENT INSTRUCTIONS
Alise or Manuel will contact you to schedule your sleep study. Their number is [phone] (ext 2). The [facility] is located on 7th floor of the [facility].    We will call you when the sleep study results are ready - if you have not heard from us by 2 weeks from the date of the study, please call [phone] (ext 1).    You are advised to abstain from driving should you feel sleepy or drowsy.

## 2017-08-25 NOTE — PROGRESS NOTES
This 47 y.o. female patient presents for the evaluation of possible obstructive sleep apnea. Referred by Dr. Regino Echeverria for AMARA eval. Seen in context of headaches.    Snoring per boyfriend  Unknown cause of 3-4 awakenings at night  Feels irritable in the morning, not refreshed  Feels a little better during the day  Excessive daytime sleepiness, ongoing about 6 months    Naps a lot - unintentionally, can doze off when talking to someone    ESS = 16    Nasal breathing is fine    Some sleep talking  Some jerks in sleep  Denies RLS    Headaches 3-4 times per week    SLEEP ROUTINE:   Bed partner: boyfriend   Time to bed: 8 pm   Sleep onset latency: 20 mins   Disruptions or awakenings: 3-4 times, unknown   Wakeup time: 6:30 am   Perceived sleep quality: fair   Daytime naps: at least 2      Past Medical History:   Diagnosis Date    Asthma     GERD (gastroesophageal reflux disease)     Hyperlipidemia     IBS (irritable bowel syndrome)     Tension headache, chronic     Vaginal delivery     x3       Past Surgical History:   Procedure Laterality Date    CHOLECYSTECTOMY      HYSTERECTOMY         Family History   Problem Relation Age of Onset    Heart attack Father      at 70     Lymphoma Mother     Heart disease Sister     Breast cancer Neg Hx     Colon cancer Neg Hx     Ovarian cancer Neg Hx        Social History   Substance Use Topics    Smoking status: Former Smoker     Years: 15.00     Types: Cigarettes     Quit date: 2002    Smokeless tobacco: Never Used      Comment: quit when she was 33 yrs old.    Alcohol use Yes      Comment: rarely       ALLERGIES: Reviewed in EPIC    CURRENT MEDICATIONS: Reviewed in EPIC    REVIEW OF SYSTEMS:  Sleep related symptoms as per HPI;    Denies weight gain; Lost 10 lbs   Sometimes sinus problems;    Positive dyspnea;    Positive palpitations;    Positive acid reflux;    Denies polyuria;    Positive headaches;    Positive mood disturbance;    Denies anemia;    Otherwise, a  balance of systems reviewed is negative.    PHYSICAL EXAM:  /66 (BP Location: Right arm, Patient Position: Sitting)   Pulse 66   Wt 92.2 kg (203 lb 4.2 oz)   LMP  (LMP Unknown)   BMI 28.35 kg/m²   GENERAL: Well groomed; Normally developed;  HEENT: Conjunctivae are non-erythematous; Pupils equal, round, and reactive to light;    Nose is symmetrical; Nasal mucosa is pink and moist; Septum is midline;    Turbinates are normal; Nasal airflow is normal;    Posterior pharynx is pink; Posterior palate is normal; Modified Mallampati: III   Uvula is normal; Tongue is wide; Tonsils +1;    Dentition is fair; No TMJ tenderness;    Jaw opening and protrusion without click and without discomfort.  NECK: Supple. No thyromegaly. No palpable nodes. Neck circumference in inches is 14  SKIN: On face and neck: No abrasions, no rashes, no lesions.     No subcutaneous nodules are palpable.  RESPIRATORY: Chest is clear to auscultation.     Normal chest expansion and non-labored breathing at rest.  CARDIOVASCULAR: Normal S1, S2.  No murmurs, gallops or rubs.   No carotid bruits bilaterally.  EXTREMITIES: No clubbing. No cyanosis. Edema is absent.   NEURO: Oriented to time, place and person.    Normal attention span and concentration.  Station normal. Gait normal.  PSYCH: Affect is full. Mood is normal.      ASSESSMENT:    1. Sleep Apnea, unspecified. The patient symptomatically has snoring and excessive daytime sleepiness with exam findings of a crowded oral airway with medical co-morbidities of migraine heachaches. This warrants further investigation for possible obstructive sleep apnea.         PLAN:    Diagnostic: Overnight polysomnogram, in-lab due to inability to tolerate home sleep study device. Patient states that she cannot tolerate a band or anything tight around her scalp, which would trigger a headache. For this reason proceed with in-lab study.. The nature of this procedure and its indication was discussed with the  patient.    Education: During our discussion today, we talked about the etiology of obstructive sleep apnea as well as the potential ramifications of untreated sleep apnea, which could include daytime sleepiness, hypertension, heart disease and/or stroke.  We discussed potential treatment options, which could include weight loss, body positioning, use of an oral appliance, continuous positive airway pressure (CPAP), EPAP, or referral for surgical consideration.    Precautions: The patient was advised to abstain from driving should they feel sleepy or drowsy.    Follow up: I will see the patient back after the sleep study has been completed. At this time, we can review the data and discuss potential treatment options. MD/NP

## 2017-08-31 ENCOUNTER — TELEPHONE (OUTPATIENT)
Dept: SLEEP MEDICINE | Facility: OTHER | Age: 47
End: 2017-08-31

## 2017-09-02 ENCOUNTER — HOSPITAL ENCOUNTER (OUTPATIENT)
Dept: SLEEP MEDICINE | Facility: HOSPITAL | Age: 47
Discharge: HOME | End: 2017-09-02
Attending: PSYCHIATRY & NEUROLOGY
Payer: MEDICARE

## 2017-09-02 DIAGNOSIS — G47.8 UPPER AIRWAY RESISTANCE SYNDROME: ICD-10-CM

## 2017-09-02 DIAGNOSIS — G47.30 SLEEP APNEA, UNSPECIFIED TYPE: ICD-10-CM

## 2017-09-02 PROCEDURE — 95810 POLYSOM 6/> YRS 4/> PARAM: CPT

## 2017-09-02 PROCEDURE — 95810 PR POLYSOMNOGRAPHY, 4 OR MORE: ICD-10-PCS | Mod: 26,,, | Performed by: PSYCHIATRY & NEUROLOGY

## 2017-09-02 PROCEDURE — 95810 POLYSOM 6/> YRS 4/> PARAM: CPT | Mod: 26,,, | Performed by: PSYCHIATRY & NEUROLOGY

## 2017-09-03 NOTE — PROGRESS NOTES
Pt. did not meet criteria for CPAP. Few respiratory events were observed. Supine REM sleep was obtained.    Low sat of 92% was observed in study. EKG revealed rare PVC.. Soft snoring was heard in study. Thank you letter was given in a.m.

## 2017-09-15 ENCOUNTER — TELEPHONE (OUTPATIENT)
Dept: SLEEP MEDICINE | Facility: OTHER | Age: 47
End: 2017-09-15

## 2017-09-18 ENCOUNTER — OFFICE VISIT (OUTPATIENT)
Dept: SLEEP MEDICINE | Facility: CLINIC | Age: 47
End: 2017-09-18
Payer: MEDICARE

## 2017-09-18 VITALS
HEIGHT: 71 IN | BODY MASS INDEX: 28.86 KG/M2 | HEART RATE: 82 BPM | WEIGHT: 206.13 LBS | DIASTOLIC BLOOD PRESSURE: 68 MMHG | OXYGEN SATURATION: 99 % | SYSTOLIC BLOOD PRESSURE: 96 MMHG

## 2017-09-18 DIAGNOSIS — E66.3 OVERWEIGHT (BMI 25.0-29.9): ICD-10-CM

## 2017-09-18 DIAGNOSIS — G47.8 UPPER AIRWAY RESISTANCE SYNDROME: Primary | ICD-10-CM

## 2017-09-18 PROCEDURE — 99214 OFFICE O/P EST MOD 30 MIN: CPT | Mod: S$PBB,,, | Performed by: NURSE PRACTITIONER

## 2017-09-18 PROCEDURE — 99999 PR PBB SHADOW E&M-EST. PATIENT-LVL III: CPT | Mod: PBBFAC,,, | Performed by: NURSE PRACTITIONER

## 2017-09-18 PROCEDURE — 99213 OFFICE O/P EST LOW 20 MIN: CPT | Mod: PBBFAC | Performed by: NURSE PRACTITIONER

## 2017-09-18 NOTE — PROGRESS NOTES
Sindhu Diaz seen in follow-up to discuss sleep study results and potential treatment options. Last seen by Dr. Can 08/25/2017. This is her initial visit with me.     08/25/2017 Dr. Burton This 47 y.o. female patient presents for the evaluation of possible obstructive sleep apnea. Referred by Dr. Regino Echeverria for AMARA eval. Seen in context of headaches.    Snoring per boyfriend  Unknown cause of 3-4 awakenings at night  Feels irritable in the morning, not refreshed  Feels a little better during the day  Excessive daytime sleepiness, ongoing about 6 months    Naps a lot - unintentionally, can doze off when talking to someone    ESS = 16    Nasal breathing is fine    Some sleep talking  Some jerks in sleep  Denies RLS    Headaches 3-4 times per week    SLEEP ROUTINE:   Bed partner: boyfriend   Time to bed: 8 pm   Sleep onset latency: 20 mins   Disruptions or awakenings: 3-4 times, unknown   Wakeup time: 6:30 am   Perceived sleep quality: fair   Daytime naps: at least 2    INTERVAL HISTORY:    09/18/2017 KLAUS Álvarez NP: Discussed in-lab sleep study results in detail. Pt complaints of snoring and excessive daytime sleepiness remain the same. ESS 14.     Baseline Sleep Study: 09/02/2017  lb. The overall AHI was 2.5 with an oxygen tejinder of 94.0%. The supine AHI was 7.4 and the REM AHI was 11.4. RDI 8.1 (due to RERAs)      Past Medical History:   Diagnosis Date    Asthma     GERD (gastroesophageal reflux disease)     Hyperlipidemia     IBS (irritable bowel syndrome)     Tension headache, chronic     Vaginal delivery     x3       Past Surgical History:   Procedure Laterality Date    CHOLECYSTECTOMY      HYSTERECTOMY         Family History   Problem Relation Age of Onset    Heart attack Father      at 70     Lymphoma Mother     Heart disease Sister     Breast cancer Neg Hx     Colon cancer Neg Hx     Ovarian cancer Neg Hx        Social History   Substance Use Topics    Smoking status: Former Smoker      "Years: 15.00     Types: Cigarettes     Quit date: 2002    Smokeless tobacco: Never Used      Comment: quit when she was 33 yrs old.    Alcohol use Yes      Comment: rarely       ALLERGIES: Reviewed in EPIC    CURRENT MEDICATIONS: Reviewed in EPIC    REVIEW OF SYSTEMS:  Sleep related symptoms as per HPI;    Denies weight gain; Lost 10 lbs   Sometimes sinus problems;    Positive dyspnea;    Positive palpitations;    Positive acid reflux;    Denies polyuria;    Positive headaches;    Positive mood disturbance;    Denies anemia;    Otherwise, a balance of systems reviewed is negative.    PHYSICAL EXAM:  BP 96/68 (BP Location: Left arm, Patient Position: Sitting, BP Method: Large (Manual))   Pulse 82   Ht 5' 11" (1.803 m)   Wt 93.5 kg (206 lb 2.1 oz)   LMP  (LMP Unknown)   SpO2 99%   BMI 28.75 kg/m²   GENERAL: Well groomed; Normally developed;  HEENT: Conjunctivae are non-erythematous; Pupils equal, round, and reactive to light;    Nose is symmetrical; Nasal mucosa is pink and moist; Septum is midline;    Turbinates are normal; Nasal airflow is normal;    Posterior pharynx is pink; Posterior palate is normal; Modified Mallampati: III   Uvula is normal; Tongue is wide; Tonsils +1;    Dentition is fair; No TMJ tenderness;    Jaw opening and protrusion without click and without discomfort.  NECK: Supple. No thyromegaly. No palpable nodes. Neck circumference in inches is 14  SKIN: On face and neck: No abrasions, no rashes, no lesions.     No subcutaneous nodules are palpable.  RESPIRATORY: Chest is clear to auscultation.     Normal chest expansion and non-labored breathing at rest.  CARDIOVASCULAR: Normal S1, S2.  No murmurs, gallops or rubs.   No carotid bruits bilaterally.  EXTREMITIES: No clubbing. No cyanosis. Edema is absent.   NEURO: Oriented to time, place and person.    Normal attention span and concentration.  Station normal. Gait normal.  PSYCH: Affect is full. Mood is normal.      ASSESSMENT:    Upper " airway resistance syndrome, mild (elevated RDI due to RERAs). The patient symptomatically has snoring and excessive daytime sleepiness with exam findings of a crowded oral airway with medical co-morbidities of migraine heachaches.     Overweight BMI, discussed relationship between UARS and weight, currently modifying diet by eating at [employer] and portion control         PLAN:    Education: During our discussion today, we talked about the etiology of obstructive sleep apnea as well as the potential ramifications of untreated sleep apnea, which could include daytime sleepiness, hypertension, heart disease and/or stroke.  We discussed potential treatment options, which could include weight loss, body positioning, use of an oral appliance, continuous positive airway pressure (CPAP), EPAP, or referral for surgical consideration.    Treatment: pt prefers weight loss and ENT consultation. Pt currently already modifying diet for weight loss. Discussed referral to Bariatric Medicine, which she will contact me about when she is ready; referral deferred for now. She will make appointment with ENT Dr. Rojas.     Precautions: The patient was advised to abstain from driving should they feel sleepy or drowsy.    Follow up: I will see the patient back after the sleep study has been completed. At this time, we can review the data and discuss potential treatment options. MD/NP

## 2017-09-22 ENCOUNTER — OFFICE VISIT (OUTPATIENT)
Dept: URGENT CARE | Facility: CLINIC | Age: 47
End: 2017-09-22
Payer: MEDICARE

## 2017-09-22 VITALS
OXYGEN SATURATION: 98 % | HEIGHT: 71 IN | RESPIRATION RATE: 18 BRPM | DIASTOLIC BLOOD PRESSURE: 71 MMHG | HEART RATE: 82 BPM | TEMPERATURE: 98 F | BODY MASS INDEX: 28.7 KG/M2 | SYSTOLIC BLOOD PRESSURE: 115 MMHG | WEIGHT: 205 LBS

## 2017-09-22 DIAGNOSIS — M54.2 NECK PAIN ON RIGHT SIDE: Primary | ICD-10-CM

## 2017-09-22 PROCEDURE — 96372 THER/PROPH/DIAG INJ SC/IM: CPT | Mod: S$GLB,,, | Performed by: NURSE PRACTITIONER

## 2017-09-22 PROCEDURE — 99213 OFFICE O/P EST LOW 20 MIN: CPT | Mod: 25,S$GLB,, | Performed by: NURSE PRACTITIONER

## 2017-09-22 RX ORDER — BETAMETHASONE SODIUM PHOSPHATE AND BETAMETHASONE ACETATE 3; 3 MG/ML; MG/ML
9 INJECTION, SUSPENSION INTRA-ARTICULAR; INTRALESIONAL; INTRAMUSCULAR; SOFT TISSUE
Status: COMPLETED | OUTPATIENT
Start: 2017-09-22 | End: 2017-09-22

## 2017-09-22 RX ADMIN — BETAMETHASONE SODIUM PHOSPHATE AND BETAMETHASONE ACETATE 9 MG: 3; 3 INJECTION, SUSPENSION INTRA-ARTICULAR; INTRALESIONAL; INTRAMUSCULAR; SOFT TISSUE at 01:09

## 2017-09-22 NOTE — PROGRESS NOTES
"Subjective:       Patient ID: Sindhu Diaz is a 47 y.o. female.    Vitals:  height is 5' 11" (1.803 m) and weight is 93 kg (205 lb). Her temperature is 98.3 °F (36.8 °C). Her blood pressure is 115/71 and her pulse is 82. Her respiration is 18 and oxygen saturation is 98%.     Chief Complaint: Neck Pain    Pt has an aching pain on the right side of her neck that shoots down her right her to her fingers, causing her fingers to feel numb. Pt states she's been dealing with this for a month & is hoping to get a shot to help with pain.      Neck Pain    This is a recurrent problem. The current episode started more than 1 month ago. The problem occurs constantly. The problem has been unchanged. The pain is associated with an unknown factor. The pain is present in the right side. The quality of the pain is described as aching. The pain is at a severity of 10/10. The pain is moderate. The pain is same all the time. Pertinent negatives include no chest pain, fever or headaches. Treatments tried: Hydrocodone. The treatment provided mild relief.     Review of Systems   Constitution: Negative for chills and fever.   HENT: Negative for sore throat.    Eyes: Negative for blurred vision.   Cardiovascular: Negative for chest pain.   Respiratory: Negative for shortness of breath.    Skin: Negative for rash.   Musculoskeletal: Positive for neck pain and stiffness. Negative for back pain and joint pain.   Gastrointestinal: Negative for abdominal pain, diarrhea, nausea and vomiting.   Neurological: Negative for headaches.   Psychiatric/Behavioral: The patient is not nervous/anxious.        Objective:      Physical Exam   Constitutional: She is oriented to person, place, and time. Vital signs are normal. She appears well-developed and well-nourished. She is active and cooperative. No distress.   HENT:   Head: Normocephalic and atraumatic.   Nose: Nose normal.   Mouth/Throat: Oropharynx is clear and moist and mucous membranes are normal. "   Eyes: Conjunctivae and lids are normal.   Neck: Trachea normal, normal range of motion, full passive range of motion without pain and phonation normal. Neck supple.   Cardiovascular: Normal rate, regular rhythm, normal heart sounds, intact distal pulses and normal pulses.    Pulmonary/Chest: Effort normal and breath sounds normal.   Abdominal: Soft. Normal appearance and bowel sounds are normal. She exhibits no abdominal bruit, no pulsatile midline mass and no mass.   Musculoskeletal: She exhibits no edema or deformity.        Cervical back: She exhibits tenderness (ABOUT RIGHT CERVICAL AREA) and pain (RIGHT CERVICAL AREA RADIATING DOWN RIGHT ARM).   Neurological: She is alert and oriented to person, place, and time. She has normal strength and normal reflexes. No sensory deficit.   Skin: Skin is warm, dry and intact. She is not diaphoretic.   Psychiatric: She has a normal mood and affect. Her speech is normal and behavior is normal. Judgment and thought content normal. Cognition and memory are normal.   Nursing note and vitals reviewed.      Assessment:       1. Neck pain on right side        Plan:         Neck pain on right side  -     betamethasone acetate-betamethasone sodium phosphate injection 9 mg; Inject 1.5 mLs (9 mg total) into the muscle one time.    CONTINUE NORCO AS NEEDED AND FOLLOW UP WITH ORTHO    Please drink plenty of fluids.  Please get plenty of rest.  Please return here or go to the Emergency Department for any concerns or worsening of condition.  If you were prescribed a narcotic medication, do not drive or operate heavy equipment or machinery while taking these medications.  If you were not prescribed an anti-inflammatory medication, and if you do not have any history of stomach/intestinal ulcers, or kidney disease, or are not taking a blood thinner such as Coumadin, Plavix, Pradaxa, Eloquis, or Xaralta for example, it is OK to take over the counter Ibuprofen or Advil or Motrin or Aleve as  directed.  Do not take these medications on an empty stomach.  Rest, ice, compression and elevation to the affected joint or limb as needed.  Please follow up with your primary care doctor or specialist as needed.    If you  smoke, please stop smoking.

## 2017-09-22 NOTE — PATIENT INSTRUCTIONS
Neck Pain    There are several possible causes of neck pain when there is no injury:  · You can get a minor ligament sprain or muscle strain from a sudden minor neck movement. Sleeping with your neck in an awkward position can also cause this.  · Some people respond to emotional stress by tensing the muscles of their neck, shoulders, and upper back. Chronic spasm in these muscles can cause neck pain and sometimes headaches.  · Gradual wear and tear of the joints in the spine can cause degenerative arthritis. This can be a source of occasional or chronic neck pain.  · The spinal disks may bulge and put pressure on a nearby spinal nerve. This can happen as a natural result of aging or repeated small injuries to the neck. The spinal disks are the cushions between each spinal bone. This causes tingling, pain, or numbness that spreads from the neck to the shoulder, arm, or hand on one side.  Acute neck pain usually gets better in 1 to 2 weeks. Neck pain related to disk disease, arthritis in the spinal joints, or spinal stenosis can become chronic and last for months or years. Spinal stenosis is narrowing of the spinal canal.  X-rays are usually not ordered for the initial evaluation of neck pain. However, X-rays may be done if you had a forceful physical injury, such as a car accident or fall. If pain continues and doesnt respond to medical treatment, X-rays and other tests may be done at a later time.  Home care  · Rest and relax the muscles. Use a comfortable pillow that supports the head. It should also help keep the spine in a neutral position. The position of the head should not be tilted forward or backward. A rolled up towel may help for a custom fit.  · Some people find relief with heat. Heat can be applied with either a warm shower or bath or a moist towel heated in the microwave and massage. Others prefer cold packs. You can make an ice pack by filling a plastic bag that seals at the top with ice cubes or  crushed ice and then wrapping it with a thin towel. Try both and use the method that feels best for 15 to 20 minutes, several times a day.  · Whether using ice or heat, be careful that you do not injure your skin. Never put ice directly on the skin. Always wrap the ice in a towel or other type of cloth.This is very important, especially in people with poor skin sensations.   · Try to reduce your stress level. Emotional stress can lead to neck muscle tension and get in the way of or delay the healing process.  · You may use over-the-counter pain medicine to control pain, unless another medicine was prescribed. If you have chronic liver or kidney disease or ever had a stomach ulcer or GI bleeding, talk with your healthcare provider before using these medicines.  Follow-up care  Follow up with your healthcare provider if your symptoms do not show signs of improvement after one week. Physical therapy or further tests may be needed.  If X-rays, CT scans, or MRI scans were taken, you will be told of any new findings that may affect your care.  Call 911  Call 911 if you have:  · Sudden weakness or numbness in one or both arms  · Neck swelling, difficulty or painful swallowing  · Difficulty breathing  · Chest pain  When to seek medical advice  Call your healthcare provider right away if any of these occur:  · Pain becomes worse or spreads into one or both arm  · Increasing headache  · Fever of 100.4°F (38°C) or above lasting for 24 to 48 hours  Date Last Reviewed: 7/1/2016  © 3630-3385 [employer]. [address] [place] [address], [place] [address]. All rights reserved. This information is not intended as a substitute for professional medical care. Always follow your healthcare professional's instructions.

## 2017-09-23 ENCOUNTER — TELEPHONE (OUTPATIENT)
Dept: URGENT CARE | Facility: CLINIC | Age: 47
End: 2017-09-23

## 2017-12-13 ENCOUNTER — OFFICE VISIT (OUTPATIENT)
Dept: GASTROENTEROLOGY | Facility: CLINIC | Age: 47
End: 2017-12-13
Payer: MEDICARE

## 2017-12-13 VITALS
WEIGHT: 213 LBS | HEART RATE: 73 BPM | HEIGHT: 71 IN | RESPIRATION RATE: 18 BRPM | DIASTOLIC BLOOD PRESSURE: 68 MMHG | BODY MASS INDEX: 29.82 KG/M2 | SYSTOLIC BLOOD PRESSURE: 110 MMHG

## 2017-12-13 DIAGNOSIS — K21.9 GASTROESOPHAGEAL REFLUX DISEASE, ESOPHAGITIS PRESENCE NOT SPECIFIED: ICD-10-CM

## 2017-12-13 DIAGNOSIS — K59.09 CHRONIC CONSTIPATION: ICD-10-CM

## 2017-12-13 DIAGNOSIS — R10.13 ABDOMINAL PAIN, EPIGASTRIC: Primary | ICD-10-CM

## 2017-12-13 PROCEDURE — 99214 OFFICE O/P EST MOD 30 MIN: CPT | Mod: S$GLB,,, | Performed by: NURSE PRACTITIONER

## 2017-12-13 RX ORDER — PROMETHAZINE HYDROCHLORIDE AND CODEINE PHOSPHATE 6.25; 1 MG/5ML; MG/5ML
SOLUTION ORAL
Refills: 0 | COMMUNITY
Start: 2017-12-06 | End: 2017-12-29

## 2017-12-13 RX ORDER — PANTOPRAZOLE SODIUM 40 MG/1
40 TABLET, DELAYED RELEASE ORAL DAILY
Qty: 30 TABLET | Refills: 3 | Status: SHIPPED | OUTPATIENT
Start: 2017-12-13 | End: 2019-09-05

## 2017-12-13 RX ORDER — AMOXICILLIN 500 MG/1
CAPSULE ORAL
Refills: 0 | COMMUNITY
Start: 2017-12-06 | End: 2017-12-29

## 2017-12-13 RX ORDER — ESTRADIOL 0.5 MG/1
TABLET ORAL
Refills: 3 | COMMUNITY
Start: 2017-09-05 | End: 2019-09-05

## 2017-12-13 RX ORDER — METHYLPREDNISOLONE 4 MG/1
TABLET ORAL
Refills: 0 | COMMUNITY
Start: 2017-09-11 | End: 2017-12-29

## 2017-12-13 NOTE — PROGRESS NOTES
Subjective:       Patient ID: Sindhu Diaz is a 47 y.o. female.    Chief Complaint: Gastroesophageal Reflux    HPI  Reports over one year of acid reflux and abdominal pain and burning.  No nausea or vomiting, though does report regurgitation.  No dysphagia.  She has been on Nexium for one year without significant improvement.  Carafate was added QID two months ago with continued complaints.  She reports EGD and colonoscopy earlier this year at [facility].  Reports both were normal.  She reports she has also had abdominal US and CT scan.  At one point MRI abdomen was ordered.  Looks like this was done at a time of diarrhea, though she reports today she has constipation chronically that is controlled with the use of Linzess daily.  Denies diarrhea or lower abdominal pain.  Denies blood with bowel movements or black stools.     Review of Systems   Constitutional: Negative for activity change, appetite change, fatigue, fever and unexpected weight change.   HENT: Negative for trouble swallowing.    Respiratory: Negative.  Negative for shortness of breath.    Cardiovascular: Negative for chest pain.   Gastrointestinal: Positive for abdominal pain and constipation. Negative for diarrhea, nausea and vomiting.   Genitourinary: Negative.    Musculoskeletal: Negative.    Neurological: Negative.    Psychiatric/Behavioral: Negative.        Objective:      Physical Exam   Constitutional: She is oriented to person, place, and time. She appears well-developed and well-nourished.   Pulmonary/Chest: Effort normal. No respiratory distress.   Abdominal: Soft. She exhibits no distension and no mass. There is tenderness in the epigastric area. There is no guarding.   Neurological: She is alert and oriented to person, place, and time.   Skin: Skin is warm and dry.   Psychiatric: She has a normal mood and affect. Her behavior is normal. Judgment and thought content normal.   Vitals reviewed.      Assessment:       1. Abdominal pain,  epigastric    2. Gastroesophageal reflux disease, esophagitis presence not specified    3. Chronic constipation        Plan:         Sindhu was seen today for gastroesophageal reflux.    Diagnoses and all orders for this visit:    Abdominal pain, epigastric  -     NM Gastric Emptying; Future  -     FL Upper GI With KUB; Future    Gastroesophageal reflux disease, esophagitis presence not specified  -     NM Gastric Emptying; Future  -     FL Upper GI With KUB; Future    Chronic constipation    Other orders  -     pantoprazole (PROTONIX) 40 MG tablet; Take 1 tablet (40 mg total) by mouth once daily.    Change PPI to pantoprazole.  Have discussed reflux prevention.    Will review records from previous extensive GI workup earlier this year.  If reflux continues despite medication and lifestyle changes, could consider referral to discuss surgical options for treatment.

## 2017-12-15 ENCOUNTER — TELEPHONE (OUTPATIENT)
Dept: GASTROENTEROLOGY | Facility: CLINIC | Age: 47
End: 2017-12-15

## 2017-12-15 NOTE — TELEPHONE ENCOUNTER
----- Message from KHARI King sent at 12/15/2017  1:08 PM CST -----  I did not order these labs, please find out who her PCP is and forward them copies of labs

## 2017-12-15 NOTE — TELEPHONE ENCOUNTER
Spoke with patient and gave her results of the test and let her know to call her pcp Dr. Roberto for her lab results.

## 2017-12-19 ENCOUNTER — TELEPHONE (OUTPATIENT)
Dept: CARDIOLOGY | Facility: CLINIC | Age: 47
End: 2017-12-19

## 2017-12-21 ENCOUNTER — TELEPHONE (OUTPATIENT)
Dept: GASTROENTEROLOGY | Facility: CLINIC | Age: 47
End: 2017-12-21

## 2017-12-29 ENCOUNTER — OFFICE VISIT (OUTPATIENT)
Dept: CARDIOLOGY | Facility: CLINIC | Age: 47
End: 2017-12-29
Payer: MEDICARE

## 2017-12-29 VITALS
DIASTOLIC BLOOD PRESSURE: 58 MMHG | WEIGHT: 214.5 LBS | SYSTOLIC BLOOD PRESSURE: 119 MMHG | BODY MASS INDEX: 30.03 KG/M2 | OXYGEN SATURATION: 99 % | HEART RATE: 74 BPM | HEIGHT: 71 IN

## 2017-12-29 DIAGNOSIS — E78.5 HYPERLIPIDEMIA, UNSPECIFIED HYPERLIPIDEMIA TYPE: ICD-10-CM

## 2017-12-29 DIAGNOSIS — I10 HTN (HYPERTENSION): ICD-10-CM

## 2017-12-29 DIAGNOSIS — R07.9 CHEST PAIN, UNSPECIFIED TYPE: Primary | ICD-10-CM

## 2017-12-29 DIAGNOSIS — I35.0 AORTIC VALVE STENOSIS, ETIOLOGY OF CARDIAC VALVE DISEASE UNSPECIFIED: ICD-10-CM

## 2017-12-29 DIAGNOSIS — R00.2 PALPITATIONS: ICD-10-CM

## 2017-12-29 PROCEDURE — 99999 PR PBB SHADOW E&M-EST. PATIENT-LVL III: CPT | Mod: PBBFAC,,, | Performed by: INTERNAL MEDICINE

## 2017-12-29 PROCEDURE — 93010 ELECTROCARDIOGRAM REPORT: CPT | Mod: S$PBB,,, | Performed by: INTERNAL MEDICINE

## 2017-12-29 PROCEDURE — 99213 OFFICE O/P EST LOW 20 MIN: CPT | Mod: PBBFAC | Performed by: INTERNAL MEDICINE

## 2017-12-29 PROCEDURE — 93005 ELECTROCARDIOGRAM TRACING: CPT | Mod: PBBFAC | Performed by: INTERNAL MEDICINE

## 2017-12-29 PROCEDURE — 99214 OFFICE O/P EST MOD 30 MIN: CPT | Mod: S$PBB,,, | Performed by: INTERNAL MEDICINE

## 2017-12-29 NOTE — PROGRESS NOTES
Subjective:    Patient ID:  Sindhu Diaz is a 47 y.o. female who presents for follow-up of Palpitations      HPI   CP, asthma, palpitations    4/11/17 C - EDP 9, EF 60%, normal coronaries     Echo 12/14/16    1 - Normal left ventricular systolic function (EF 55-60%).     2 - Concentric remodeling.     3 - Normal left ventricular diastolic function.     4 - Normal right ventricular systolic function .     5 - Mild aortic stenosis, NITZA = 1.99 cm2, mean gradient = 3.6 mmHg.     6 - Appears to possiblely be a bicuspid aortic valve.     7 - Mild mitral regurgitation.     8 - Trivial tricuspid regurgitation.     9 - The estimated PA systolic pressure is 15 mmHg.      Stress test 4/3/17  LVEF: 67 %  Impression: ABNORMAL MYOCARDIAL PERFUSION  1. There is evidence for moderate myocardial ischemia in the anterior wall of the left ventricle.   2. The perfusion scan is free of evidence for myocardial injury.   3. Resting wall motion is physiologic.   4. Resting LV function is normal.     Went to the ER yesterday with heart racing and SOB  Episodes of heart racing occur 1-2 times per day  EKG NSR - ok    Review of Systems   Constitution: Negative for decreased appetite.   HENT: Negative for ear discharge.    Eyes: Negative for blurred vision.   Respiratory: Negative for hemoptysis.    Endocrine: Negative for polyphagia.   Hematologic/Lymphatic: Negative for adenopathy.   Skin: Negative for color change.   Musculoskeletal: Negative for joint swelling.   Neurological: Negative for brief paralysis.   Psychiatric/Behavioral: Negative for hallucinations.        Objective:    Physical Exam   Constitutional: She is oriented to person, place, and time. She appears well-developed and well-nourished.   HENT:   Head: Normocephalic and atraumatic.   Eyes: Conjunctivae are normal. Pupils are equal, round, and reactive to light.   Neck: Normal range of motion. Neck supple.   Cardiovascular: Normal rate, normal heart sounds and intact  distal pulses.    Pulmonary/Chest: Effort normal and breath sounds normal.   Abdominal: Soft. Bowel sounds are normal.   Musculoskeletal: Normal range of motion.   Neurological: She is alert and oriented to person, place, and time.   Skin: Skin is warm and dry.         Assessment:       1. Chest pain, unspecified type    2. Hyperlipidemia, unspecified hyperlipidemia type    3. Aortic valve stenosis, etiology of cardiac valve disease unspecified    4. Palpitations         Plan:       Holter for palpitations  Echo to look at AV

## 2018-01-02 ENCOUNTER — TELEPHONE (OUTPATIENT)
Dept: GASTROENTEROLOGY | Facility: CLINIC | Age: 48
End: 2018-01-02

## 2018-01-04 ENCOUNTER — OFFICE VISIT (OUTPATIENT)
Dept: GASTROENTEROLOGY | Facility: CLINIC | Age: 48
End: 2018-01-04
Payer: MEDICARE

## 2018-01-04 VITALS
WEIGHT: 212.75 LBS | SYSTOLIC BLOOD PRESSURE: 105 MMHG | BODY MASS INDEX: 29.78 KG/M2 | HEIGHT: 71 IN | DIASTOLIC BLOOD PRESSURE: 71 MMHG

## 2018-01-04 DIAGNOSIS — K21.9 GASTROESOPHAGEAL REFLUX DISEASE, ESOPHAGITIS PRESENCE NOT SPECIFIED: Primary | ICD-10-CM

## 2018-01-04 DIAGNOSIS — R10.13 DYSPEPSIA: ICD-10-CM

## 2018-01-04 DIAGNOSIS — R10.13 ABDOMINAL PAIN, EPIGASTRIC: ICD-10-CM

## 2018-01-04 PROCEDURE — 99999 PR PBB SHADOW E&M-EST. PATIENT-LVL III: CPT | Mod: PBBFAC,,, | Performed by: NURSE PRACTITIONER

## 2018-01-04 PROCEDURE — 99213 OFFICE O/P EST LOW 20 MIN: CPT | Mod: PBBFAC,PO | Performed by: NURSE PRACTITIONER

## 2018-01-04 PROCEDURE — 99213 OFFICE O/P EST LOW 20 MIN: CPT | Mod: S$GLB,,, | Performed by: NURSE PRACTITIONER

## 2018-01-04 NOTE — PROGRESS NOTES
"Subjective:       Patient ID: Sindhu Diaz is a 47 y.o. female.    Chief Complaint: Follow-up    HPI  Reports continued complaints of indigestion, acid reflux, substernal burning, epigastric pain, regurgitation after eating.  She did not notice any improvement with the addition of pantoprazole after her last visit.  GES and UGI series were normal.  She presents today requesting referral to a surgeon to discuss surgical options for relief of her complaints.  She has had prior GI workup with Dr. Webber at [facility] and EGD earlier this year and reports she was told "the sphincter muscle is not working" and that surgical referral was discussed but never undertaken.    Reports complaints have been persistent and worsening for one year with acid reflux and abdominal pain and burning.  No nausea or vomiting, though does report regurgitation.  No dysphagia.  She has been on Nexium for one year without significant improvement.  Carafate was added QID with continued complaints.  She reports normal colonoscopy earlier this year at [facility].  She reports she has also had abdominal US and CT scan.  She has had cholecystectomy in the past.  At one point MRI abdomen was ordered.  Looks like this was done at a time of diarrhea, though she reports today she has constipation chronically that is controlled with the use of Linzess daily.  Denies diarrhea or lower abdominal pain.    Review of Systems   Constitutional: Negative for activity change, appetite change, fever and unexpected weight change.   HENT: Negative for trouble swallowing.    Respiratory: Positive for shortness of breath (that she has related to abdominal bloating and acid reflux).    Cardiovascular: Positive for chest pain.   Gastrointestinal: Positive for abdominal distention, abdominal pain, constipation and vomiting. Negative for diarrhea and nausea.   Genitourinary: Negative.    Musculoskeletal: Negative.    Skin: Negative.    Neurological: Negative.  "   Psychiatric/Behavioral: Negative.        Objective:      Physical Exam   Constitutional: She is oriented to person, place, and time. She appears well-developed and well-nourished. No distress.   Eyes: No scleral icterus.   Cardiovascular: Normal rate.    Pulmonary/Chest: Effort normal. No respiratory distress.   Neurological: She is alert and oriented to person, place, and time.   Skin: Skin is warm. She is not diaphoretic.   Psychiatric: She has a normal mood and affect. Her behavior is normal. Judgment and thought content normal.   Vitals reviewed.      Assessment:       1. Gastroesophageal reflux disease, esophagitis presence not specified    2. Abdominal pain, epigastric    3. Dyspepsia        Plan:         Sindhu was seen today for follow-up.    Diagnoses and all orders for this visit:    Gastroesophageal reflux disease, esophagitis presence not specified  -     Ambulatory referral to General Surgery    Abdominal pain, epigastric  -     Ambulatory referral to General Surgery    Dyspepsia  -     Ambulatory referral to General Surgery    Will refer to surgery for further discussion.   Could consider repeat EGD.   We have discussed reflux prevention. She can continue with gaviscon and pantoprazole.  Trial of FDgard.  Will again request records from previous workup.

## 2018-01-09 ENCOUNTER — HOSPITAL ENCOUNTER (OUTPATIENT)
Dept: CARDIOLOGY | Facility: HOSPITAL | Age: 48
Discharge: HOME | End: 2018-01-09
Attending: INTERNAL MEDICINE
Payer: MEDICARE

## 2018-01-09 DIAGNOSIS — R00.2 PALPITATIONS: ICD-10-CM

## 2018-01-09 DIAGNOSIS — E78.5 HYPERLIPIDEMIA, UNSPECIFIED HYPERLIPIDEMIA TYPE: ICD-10-CM

## 2018-01-09 DIAGNOSIS — R07.9 CHEST PAIN, UNSPECIFIED TYPE: ICD-10-CM

## 2018-01-09 DIAGNOSIS — I35.0 AORTIC VALVE STENOSIS, ETIOLOGY OF CARDIAC VALVE DISEASE UNSPECIFIED: ICD-10-CM

## 2018-01-15 ENCOUNTER — TELEPHONE (OUTPATIENT)
Dept: PODIATRY | Facility: CLINIC | Age: 48
End: 2018-01-15

## 2018-01-15 NOTE — TELEPHONE ENCOUNTER
----- Message from Mariely Knapp sent at 1/15/2018  1:55 PM CST -----  Question about referral for Dr Mcdonald

## 2018-01-15 NOTE — TELEPHONE ENCOUNTER
Patient wanted the Name of general surgery that Jose C carbajal recommended which was scotty Ramirez at [facility]

## 2018-01-17 ENCOUNTER — TELEPHONE (OUTPATIENT)
Dept: SURGERY | Facility: CLINIC | Age: 48
End: 2018-01-17

## 2018-01-17 NOTE — TELEPHONE ENCOUNTER
Left VM for pt to return phone call in reference to her appt scheduled for tomorrow and possibly rescheduling it to a later time that day.

## 2018-01-18 LAB
DIASTOLIC DYSFUNCTION: NO
RETIRED EF AND QEF - SEE NOTES: 55 (ref 55–65)

## 2018-01-25 ENCOUNTER — OFFICE VISIT (OUTPATIENT)
Dept: SURGERY | Facility: CLINIC | Age: 48
End: 2018-01-25
Payer: MEDICARE

## 2018-01-25 VITALS
WEIGHT: 216.25 LBS | HEIGHT: 71 IN | DIASTOLIC BLOOD PRESSURE: 62 MMHG | HEART RATE: 72 BPM | BODY MASS INDEX: 30.27 KG/M2 | SYSTOLIC BLOOD PRESSURE: 132 MMHG | TEMPERATURE: 98 F

## 2018-01-25 DIAGNOSIS — K21.9 GASTROESOPHAGEAL REFLUX DISEASE, ESOPHAGITIS PRESENCE NOT SPECIFIED: Primary | ICD-10-CM

## 2018-01-25 PROCEDURE — 99999 PR PBB SHADOW E&M-EST. PATIENT-LVL III: CPT | Mod: PBBFAC,,, | Performed by: SURGERY

## 2018-01-25 PROCEDURE — 99213 OFFICE O/P EST LOW 20 MIN: CPT | Mod: PBBFAC | Performed by: SURGERY

## 2018-01-25 PROCEDURE — 99203 OFFICE O/P NEW LOW 30 MIN: CPT | Mod: S$GLB,,, | Performed by: SURGERY

## 2018-01-25 NOTE — Clinical Note
January 25, 2018      ADRIANA Carson MD  [address]  [address]  [facility] [address]           [facility]  [address]  [place] [address]  Phone: [phone]          Patient: Sindhu Diaz   MR Number: [id number]   YOB: 1970   Date of Visit: 1/25/2018       Dear Dr. ADRIANA Carson:    Thank you for referring Sindhu Diaz to me for evaluation. Attached you will find relevant portions of my assessment and plan of care.    If you have questions, please do not hesitate to call me. I look forward to following Sindhu Diaz along with you.    Sincerely,    Demarco Ramirez MD    Enclosure  CC:  No Recipients    If you would like to receive this communication electronically, please contact [email or web address] or [phone] to request more information on [email or web address] Link access.    For providers and/or their staff who would like to refer a patient to [facility], please contact us through our one-stop-shop provider referral line, [facility], at [phone].    If you feel you have received this communication in error or would no longer like to receive these types of communications, please e-mail [email or web address]

## 2018-01-25 NOTE — PROGRESS NOTES
History & Physical    SUBJECTIVE:     History of Present Illness:  Patient is a 47 y.o. female presents with gerd.  For the past year she has had epigastric pain.  The pain is worse after eating (20 minutes) and is non-radiating, heat like and gets to 10/10.  The pain is associated with regurgitation.  Many foods make it worse and she is only able to eat oatmeal and decaf coffee.  Her symptoms are associated with regurgitation.  Her symptoms are partially relieved with ppi or sucralfate.  Her weight has been fluctuating but in the last week.  She has to stay sitting up for 5 hours after eating to prevent regurgitation.  She has had an egd which she reports was normal.    No chief complaint on file.      Review of patient's allergies indicates:  No Known Allergies    Current Outpatient Prescriptions   Medication Sig Dispense Refill    aspirin 81 MG Chew Take 1 tablet (81 mg total) by mouth once daily.  0    estradiol (ESTRACE) 0.5 MG tablet TK 1 T PO QD  3    gabapentin (NEURONTIN) 300 MG capsule Take 300 mg by mouth continuous prn.   3    hydrocodone-acetaminophen 10-325mg (NORCO)  mg Tab   0    LINZESS 290 mcg Cap TK ONE C PO QD ON EMPTY STOMACH  2    metoprolol succinate (TOPROL-XL) 25 MG 24 hr tablet Take 1 tablet (25 mg total) by mouth once daily. 30 tablet 11    ondansetron (ZOFRAN) 4 MG tablet Take 1 tablet (4 mg total) by mouth every 6 (six) hours as needed for Nausea. 12 tablet 0    oxybutynin (DITROPAN XL) 15 MG TR24 TK 1 T PO BID  0    PROAIR HFA 90 mcg/actuation inhaler Inhale 1 puff into the lungs every 4 (four) hours as needed.   0    simvastatin (ZOCOR) 40 MG tablet   5    sumatriptan (IMITREX) 50 MG tablet May repeat once after 2 hours if needed. Do not exceed 4 tabs in 1 week 12 tablet 3    SYMBICORT 160-4.5 mcg/actuation HFAA Inhale 2 puffs into the lungs once daily.   0    theophylline (THEODUR) 300 MG 12 hr tablet TK 1 T PO QD  0    tramadol (ULTRAM) 50 mg tablet   1     "pantoprazole (PROTONIX) 40 MG tablet Take 1 tablet (40 mg total) by mouth once daily. 30 tablet 3     No current facility-administered medications for this visit.        Past Medical History:   Diagnosis Date    Asthma     GERD (gastroesophageal reflux disease)     Hyperlipidemia     IBS (irritable bowel syndrome)     Tension headache, chronic     Vaginal delivery     x3     Past Surgical History:   Procedure Laterality Date    CHOLECYSTECTOMY      HYSTERECTOMY       Family History   Problem Relation Age of Onset    Heart attack Father      at 70     Lymphoma Mother     Heart disease Sister     Breast cancer Neg Hx     Colon cancer Neg Hx     Ovarian cancer Neg Hx      Social History   Substance Use Topics    Smoking status: Former Smoker     Years: 15.00     Types: Cigarettes     Quit date: 2002    Smokeless tobacco: Never Used      Comment: quit when she was 33 yrs old.    Alcohol use Yes      Comment: rarely        Review of Systems:  Review of Systems   Constitutional: Negative for fever.   Respiratory: Positive for shortness of breath. Negative for chest tightness.         Has asthma   Cardiovascular: Negative for chest pain.   Gastrointestinal: Positive for constipation. Negative for diarrhea.        Has IBS   Genitourinary: Negative for difficulty urinating and dysuria.   Skin: Negative for rash and wound.   Neurological: Positive for headaches. Negative for seizures.        Has migraines   Hematological: Does not bruise/bleed easily.       OBJECTIVE:     Vital Signs (Most Recent)  Temp: 98 °F (36.7 °C) (01/25/18 0752)  Pulse: 72 (01/25/18 0752)  BP: 132/62 (01/25/18 0752)  5' 11" (1.803 m)  98.1 kg (216 lb 4.3 oz)     Physical Exam:  Physical Exam   Constitutional: She is oriented to person, place, and time. She appears well-developed and well-nourished.   Neck: Normal range of motion. Neck supple.   Cardiovascular: Normal rate, regular rhythm and normal heart sounds.    Pulmonary/Chest: " Effort normal and breath sounds normal.   Abdominal: Soft. Bowel sounds are normal. There is tenderness in the epigastric area. There is no rigidity and no guarding.       Neurological: She is alert and oriented to person, place, and time.   Skin: Skin is warm and dry.   Psychiatric: She has a normal mood and affect. Her behavior is normal. Judgment and thought content normal.   Vitals reviewed.      Laboratory  CBC: Reviewed  CMP: Reviewed    Diagnostic Results:  UGI and GES: Reviewed    ASSESSMENT/PLAN:     GERD    PLAN:    Obtain ph and motility study.

## 2018-01-25 NOTE — LETTER
[facility] - General Surgery  [address]  [facility] [address]  Phone: [phone] January 25, 2018        ADRIANA Carson MD  [address]  [address]  [facility] [address]    Patient: Sindhu Diaz   MR Number: [id number]   YOB: 1970   Date of Visit: 1/25/2018     Dear Dr. Carson:    Thank you for referring Sindhu Diaz to me for evaluation. Below are the relevant portions of my assessment and plan of care.    Patient presents with GERD.     PLAN:  - Obtain ph and motility study.    If you have questions, please do not hesitate to call me. I look forward to following Sindhu along with you.    Sincerely,      Demarco Ramirez MD   Section Head - General, Laparoscopic, Bariatric  Acute Care and Oncologic Surgery   - Surgical Weight Loss Program  [facility]    [email or web address]

## 2018-02-05 ENCOUNTER — OFFICE VISIT (OUTPATIENT)
Dept: CARDIOLOGY | Facility: CLINIC | Age: 48
End: 2018-02-05
Payer: MEDICARE

## 2018-02-05 VITALS
OXYGEN SATURATION: 98 % | HEART RATE: 66 BPM | HEIGHT: 71 IN | DIASTOLIC BLOOD PRESSURE: 63 MMHG | WEIGHT: 212.94 LBS | BODY MASS INDEX: 29.81 KG/M2 | SYSTOLIC BLOOD PRESSURE: 135 MMHG

## 2018-02-05 DIAGNOSIS — E78.5 HYPERLIPIDEMIA, UNSPECIFIED HYPERLIPIDEMIA TYPE: Primary | ICD-10-CM

## 2018-02-05 DIAGNOSIS — R07.9 CHEST PAIN, UNSPECIFIED TYPE: ICD-10-CM

## 2018-02-05 DIAGNOSIS — R00.2 PALPITATIONS: ICD-10-CM

## 2018-02-05 DIAGNOSIS — I35.0 AORTIC VALVE STENOSIS, ETIOLOGY OF CARDIAC VALVE DISEASE UNSPECIFIED: ICD-10-CM

## 2018-02-05 PROCEDURE — 99999 PR PBB SHADOW E&M-EST. PATIENT-LVL III: CPT | Mod: PBBFAC,,, | Performed by: INTERNAL MEDICINE

## 2018-02-05 PROCEDURE — 99214 OFFICE O/P EST MOD 30 MIN: CPT | Mod: S$GLB,,, | Performed by: INTERNAL MEDICINE

## 2018-02-05 PROCEDURE — 3008F BODY MASS INDEX DOCD: CPT | Mod: S$GLB,,, | Performed by: INTERNAL MEDICINE

## 2018-02-05 NOTE — PROGRESS NOTES
Subjective:    Patient ID:  Sindhu Diaz is a 47 y.o. female who presents for follow-up of Results      HPI     CP, asthma, palpitations     4/11/17 [facility] - EDP 9, EF 60%, normal coronaries     Echo 1/17/18    1 - Normal left ventricular systolic function (EF 55-60%).     2 - Normal right ventricular systolic function .     3 - Normal left ventricular diastolic function.     4 - No wall motion abnormalities.     5.  Normal valvular function     Stress test 4/3/17  LVEF: 67 %  Impression: ABNORMAL MYOCARDIAL PERFUSION  1. There is evidence for moderate myocardial ischemia in the anterior wall of the left ventricle.   2. The perfusion scan is free of evidence for myocardial injury.   3. Resting wall motion is physiologic.   4. Resting LV function is normal.     Holter 1/17/18 - average HR 76, Few PVCs and PACS, no SVT or VT    Still with heart pounding that improves with metoprolol      Review of Systems   Constitution: Negative for decreased appetite.   HENT: Negative for ear discharge.    Eyes: Negative for blurred vision.   Respiratory: Negative for hemoptysis.    Endocrine: Negative for polyphagia.   Hematologic/Lymphatic: Negative for adenopathy.   Skin: Negative for color change.   Musculoskeletal: Negative for joint swelling.   Neurological: Negative for brief paralysis.   Psychiatric/Behavioral: Negative for hallucinations.        Objective:    Physical Exam   Constitutional: She is oriented to person, place, and time. She appears well-developed and well-nourished.   HENT:   Head: Normocephalic and atraumatic.   Eyes: Conjunctivae are normal. Pupils are equal, round, and reactive to light.   Neck: Normal range of motion. Neck supple.   Cardiovascular: Normal rate, normal heart sounds and intact distal pulses.    Pulmonary/Chest: Effort normal and breath sounds normal.   Abdominal: Soft. Bowel sounds are normal.   Musculoskeletal: Normal range of motion.   Neurological: She is alert and oriented to person,  place, and time.   Skin: Skin is warm and dry.         Assessment:       1. Hyperlipidemia, unspecified hyperlipidemia type    2. Aortic valve stenosis, etiology of cardiac valve disease unspecified    3. Palpitations    4. Chest pain, unspecified type         Plan:       Cardiac stable  OV 6 months

## 2018-02-20 ENCOUNTER — TELEPHONE (OUTPATIENT)
Dept: ORTHOPEDICS | Facility: CLINIC | Age: 48
End: 2018-02-20

## 2018-03-02 ENCOUNTER — OFFICE VISIT (OUTPATIENT)
Dept: ORTHOPEDICS | Facility: CLINIC | Age: 48
End: 2018-03-02
Payer: MEDICARE

## 2018-03-02 VITALS — WEIGHT: 214 LBS | HEIGHT: 71 IN | BODY MASS INDEX: 29.96 KG/M2

## 2018-03-02 DIAGNOSIS — S80.12XA CONTUSION OF LEFT LOWER EXTREMITY, INITIAL ENCOUNTER: Primary | ICD-10-CM

## 2018-03-02 PROCEDURE — 99999 PR PBB SHADOW E&M-EST. PATIENT-LVL II: CPT | Mod: PBBFAC,,, | Performed by: ORTHOPAEDIC SURGERY

## 2018-03-02 PROCEDURE — 99202 OFFICE O/P NEW SF 15 MIN: CPT | Mod: S$GLB,,, | Performed by: ORTHOPAEDIC SURGERY

## 2018-03-02 NOTE — PROGRESS NOTES
Subjective:      Patient ID: Sindhu Diaz is a 47 y.o. female.    Chief Complaint: Pain of the Left Lower Leg    HPI     The patient reports falling at home and striking her left leg on the side of her bathtub.  She feels that its still swollen   And diffusely painful  Review of Systems   Constitution: Negative for fever and weight loss.   HENT: Negative for congestion.    Eyes: Negative for visual disturbance.   Cardiovascular: Negative for chest pain.   Respiratory: Positive for wheezing. Negative for shortness of breath.    Hematologic/Lymphatic: Negative for bleeding problem. Does not bruise/bleed easily.   Skin: Negative for poor wound healing.   Musculoskeletal: Positive for myalgias.   Gastrointestinal: Negative for abdominal pain.   Genitourinary: Negative for dysuria.   Neurological: Negative for seizures.   Psychiatric/Behavioral: Negative for altered mental status.   Allergic/Immunologic: Negative for persistent infections.         Objective:            Ortho/SPM Exam    Right Knee    There were no vitals filed for this visit.      Left Knee      There were no vitals filed for this visit.    The patient is not in acute distress.   Body habitus is normal.   The patient walks without a limp.  There is mild swelling of the left leg compared to the right.  There is subtle indentation of the subcutaneous tissue over the junction of the middle and distal third.  Distal neurovascular exam is normal    I reviewed the relevant radiographic images for the patient's condition: Recent imaging the left lower extremity is normal      Assessment:       Encounter Diagnosis   Name Primary?    Contusion of left lower extremity, initial encounter Yes          I'm fairly certain that the patient has a fat fracture.  The swelling is reactive.  Plan:       iSndhu was seen today for pain.    Diagnoses and all orders for this visit:    Contusion of left lower extremity, initial encounter      I explained my diagnostic  impression and the reasoning behind it in detail, using layman's terms.  Elevation and compression stockings recommended

## 2018-03-07 ENCOUNTER — ANESTHESIA EVENT (OUTPATIENT)
Dept: ENDOSCOPY | Facility: HOSPITAL | Age: 48
End: 2018-03-07
Payer: MEDICARE

## 2018-03-07 ENCOUNTER — ANESTHESIA (OUTPATIENT)
Dept: ENDOSCOPY | Facility: HOSPITAL | Age: 48
End: 2018-03-07
Payer: MEDICARE

## 2018-03-07 ENCOUNTER — HOSPITAL ENCOUNTER (OUTPATIENT)
Facility: HOSPITAL | Age: 48
Discharge: HOME | End: 2018-03-07
Attending: INTERNAL MEDICINE | Admitting: INTERNAL MEDICINE
Payer: MEDICARE

## 2018-03-07 ENCOUNTER — SURGERY (OUTPATIENT)
Age: 48
End: 2018-03-07

## 2018-03-07 VITALS
DIASTOLIC BLOOD PRESSURE: 73 MMHG | RESPIRATION RATE: 19 BRPM | SYSTOLIC BLOOD PRESSURE: 127 MMHG | TEMPERATURE: 98 F | HEART RATE: 61 BPM | BODY MASS INDEX: 30.1 KG/M2 | OXYGEN SATURATION: 99 % | WEIGHT: 215 LBS | HEIGHT: 71 IN

## 2018-03-07 DIAGNOSIS — K21.9 GERD (GASTROESOPHAGEAL REFLUX DISEASE): ICD-10-CM

## 2018-03-07 DIAGNOSIS — K21.9 GASTROESOPHAGEAL REFLUX DISEASE, ESOPHAGITIS PRESENCE NOT SPECIFIED: Primary | ICD-10-CM

## 2018-03-07 PROCEDURE — 91035 G-ESOPH REFLX TST W/ELECTROD: CPT | Performed by: INTERNAL MEDICINE

## 2018-03-07 PROCEDURE — 91010 ESOPHAGUS MOTILITY STUDY: CPT | Mod: 26,,, | Performed by: INTERNAL MEDICINE

## 2018-03-07 PROCEDURE — 43239 EGD BIOPSY SINGLE/MULTIPLE: CPT | Performed by: INTERNAL MEDICINE

## 2018-03-07 PROCEDURE — D9220A PRA ANESTHESIA: Mod: CRNA,,, | Performed by: NURSE ANESTHETIST, CERTIFIED REGISTERED

## 2018-03-07 PROCEDURE — 27201012 HC FORCEPS, HOT/COLD, DISP: Performed by: INTERNAL MEDICINE

## 2018-03-07 PROCEDURE — 91010 ESOPHAGUS MOTILITY STUDY: CPT | Performed by: INTERNAL MEDICINE

## 2018-03-07 PROCEDURE — D9220A PRA ANESTHESIA: Mod: ANES,,, | Performed by: ANESTHESIOLOGY

## 2018-03-07 PROCEDURE — 43239 EGD BIOPSY SINGLE/MULTIPLE: CPT | Mod: GC,,, | Performed by: INTERNAL MEDICINE

## 2018-03-07 PROCEDURE — 88305 TISSUE EXAM BY PATHOLOGIST: CPT | Mod: 26,,, | Performed by: PATHOLOGY

## 2018-03-07 PROCEDURE — 88305 TISSUE EXAM BY PATHOLOGIST: CPT | Performed by: PATHOLOGY

## 2018-03-07 PROCEDURE — 37000008 HC ANESTHESIA 1ST 15 MINUTES: Performed by: INTERNAL MEDICINE

## 2018-03-07 PROCEDURE — 25000003 PHARM REV CODE 250: Performed by: INTERNAL MEDICINE

## 2018-03-07 PROCEDURE — 37000009 HC ANESTHESIA EA ADD 15 MINS: Performed by: INTERNAL MEDICINE

## 2018-03-07 PROCEDURE — 25000003 PHARM REV CODE 250: Performed by: NURSE ANESTHETIST, CERTIFIED REGISTERED

## 2018-03-07 PROCEDURE — 63600175 PHARM REV CODE 636 W HCPCS: Performed by: NURSE ANESTHETIST, CERTIFIED REGISTERED

## 2018-03-07 PROCEDURE — 27200942: Performed by: INTERNAL MEDICINE

## 2018-03-07 RX ORDER — LIDOCAINE HYDROCHLORIDE 20 MG/ML
JELLY TOPICAL ONCE
Status: COMPLETED | OUTPATIENT
Start: 2018-03-07 | End: 2018-03-07

## 2018-03-07 RX ORDER — SODIUM CHLORIDE 9 MG/ML
INJECTION, SOLUTION INTRAVENOUS CONTINUOUS
Status: DISCONTINUED | OUTPATIENT
Start: 2018-03-07 | End: 2018-03-07 | Stop reason: HOSPADM

## 2018-03-07 RX ORDER — PROPOFOL 10 MG/ML
VIAL (ML) INTRAVENOUS
Status: DISCONTINUED | OUTPATIENT
Start: 2018-03-07 | End: 2018-03-07

## 2018-03-07 RX ORDER — GLYCOPYRROLATE 0.2 MG/ML
INJECTION INTRAMUSCULAR; INTRAVENOUS
Status: DISCONTINUED | OUTPATIENT
Start: 2018-03-07 | End: 2018-03-07

## 2018-03-07 RX ORDER — LIDOCAINE HCL/PF 100 MG/5ML
SYRINGE (ML) INTRAVENOUS
Status: DISCONTINUED | OUTPATIENT
Start: 2018-03-07 | End: 2018-03-07

## 2018-03-07 RX ADMIN — PROPOFOL 50 MG: 10 INJECTION, EMULSION INTRAVENOUS at 09:03

## 2018-03-07 RX ADMIN — PROPOFOL 150 MG: 10 INJECTION, EMULSION INTRAVENOUS at 09:03

## 2018-03-07 RX ADMIN — LIDOCAINE HYDROCHLORIDE 100 MG: 20 INJECTION, SOLUTION INTRAVENOUS at 09:03

## 2018-03-07 RX ADMIN — LIDOCAINE HYDROCHLORIDE 10 ML: 20 JELLY TOPICAL at 08:03

## 2018-03-07 RX ADMIN — GLYCOPYRROLATE 0.2 MG: 0.2 INJECTION, SOLUTION INTRAMUSCULAR; INTRAVENOUS at 09:03

## 2018-03-07 RX ADMIN — SODIUM CHLORIDE: 9 INJECTION, SOLUTION INTRAVENOUS at 07:03

## 2018-03-07 NOTE — ANESTHESIA PREPROCEDURE EVALUATION
03/07/2018  Sindhu Diaz is a 47 y.o., female.    Anesthesia Evaluation         Review of Systems  Anesthesia Hx:  No problems with previous Anesthesia   Social:  Non-Smoker, No Alcohol Use    Cardiovascular:   Exercise tolerance: good    Pulmonary:   Asthma moderate    Hepatic/GI:   GERD, poorly controlled        Physical Exam  General:  Well nourished    Airway/Jaw/Neck:  Airway Findings: Mouth Opening: Normal Tongue: Normal  General Airway Assessment: Adult  Mallampati: II  Improves to II with phonation.  TM Distance: Normal, at least 6 cm  Jaw/Neck Findings:  Neck ROM: Normal ROM     Eyes/Ears/Nose:  Eyes/Ears/Nose Findings:    Dental:  Dental Findings: In tact   Chest/Lungs:  Chest/Lungs Findings: Clear to auscultation         Mental Status:  Mental Status Findings:  Cooperative         Anesthesia Plan  Type of Anesthesia, risks & benefits discussed:  Anesthesia Type:  general  Patient's Preference:   Intra-op Monitoring Plan: standard ASA monitors  Intra-op Monitoring Plan Comments:   Post Op Pain Control Plan:   Post Op Pain Control Plan Comments:   Induction:   IV  Beta Blocker:  Patient is on a Beta-Blocker and has received one dose within the past 24 hours (No further documentation required).       Informed Consent: Patient understands risks and agrees with Anesthesia plan.  Questions answered. Anesthesia consent signed with patient.  ASA Score: 2     Day of Surgery Review of History & Physical:    H&P update referred to the surgeon.         Ready For Surgery From Anesthesia Perspective.

## 2018-03-07 NOTE — ANESTHESIA RELEASE NOTE
"Anesthesia Release from PACU Note    Patient: Sindhu Diaz    Procedure(s) Performed: Procedure(s) (LRB):  MANOMETRY-ESOPHAGEAL-WITH IMPEDANCE (N/A)  ESOPHAGOGASTRODUODENOSCOPY (EGD) (N/A)  BRAVO ESOPHAGEAL PH (OFF REFLUX MED) (N/A)    Anesthesia type: general    Post pain: Adequate analgesia    Post assessment: no apparent anesthetic complications    Last Vitals:   Visit Vitals  /73   Pulse 61   Temp 36.8 °C (98.2 °F) (Temporal)   Resp 19   Ht 5' 11" (1.803 m)   Wt 97.5 kg (215 lb)   LMP  (LMP Unknown)   SpO2 99%   Breastfeeding? No   BMI 29.99 kg/m²       Post vital signs: stable    Level of consciousness: awake, alert  and oriented    Nausea/Vomiting: no nausea/no vomiting    Complications: none    Airway Patency: patent    Respiratory: unassisted    Cardiovascular: stable and blood pressure at baseline    Hydration: euvolemic  "

## 2018-03-07 NOTE — ANESTHESIA POSTPROCEDURE EVALUATION
"Anesthesia Post Evaluation    Patient: Sindhu Diaz    Procedure(s) Performed: Procedure(s) (LRB):  MANOMETRY-ESOPHAGEAL-WITH IMPEDANCE (N/A)  ESOPHAGOGASTRODUODENOSCOPY (EGD) (N/A)  BRAVO ESOPHAGEAL PH (OFF REFLUX MED) (N/A)    Final Anesthesia Type: general  Patient location during evaluation: PACU  Patient participation: Yes- Able to Participate  Level of consciousness: awake and alert  Post-procedure vital signs: reviewed and stable  Pain management: adequate  Airway patency: patent  PONV status at discharge: No PONV  Anesthetic complications: no      Cardiovascular status: blood pressure returned to baseline  Respiratory status: unassisted  Hydration status: euvolemic  Follow-up not needed.        Visit Vitals  /73   Pulse 61   Temp 36.8 °C (98.2 °F) (Temporal)   Resp 19   Ht 5' 11" (1.803 m)   Wt 97.5 kg (215 lb)   LMP  (LMP Unknown)   SpO2 99%   Breastfeeding? No   BMI 29.99 kg/m²       Pain/Felicita Score: Pain Assessment Performed: Yes (3/7/2018 10:29 AM)  Presence of Pain: denies (3/7/2018 10:29 AM)  Felicita Score: 9 (3/7/2018 10:14 AM)      "

## 2018-03-07 NOTE — TRANSFER OF CARE
"Anesthesia Transfer of Care Note    Patient: Sindhu Diaz    Procedure(s) Performed: Procedure(s) (LRB):  MANOMETRY-ESOPHAGEAL-WITH IMPEDANCE (N/A)  ESOPHAGOGASTRODUODENOSCOPY (EGD) (N/A)  BRAVO ESOPHAGEAL PH (OFF REFLUX MED) (N/A)    Patient location: PACU    Anesthesia Type: general    Post pain: adequate analgesia    Post assessment: no apparent anesthetic complications    Post vital signs: stable    Level of consciousness: awake    Nausea/Vomiting: no nausea/vomiting    Complications: none    Transfer of care protocol was followed      Last vitals:   Visit Vitals  /78   Pulse 73   Temp 36.7 °C (98.1 °F)   Resp 16   Ht 5' 11" (1.803 m)   Wt 97.5 kg (215 lb)   LMP  (LMP Unknown)   SpO2 98%   Breastfeeding? No   BMI 29.99 kg/m²     "

## 2018-03-07 NOTE — PROVATION PATIENT INSTRUCTIONS
Discharge Summary/Instructions after an Endoscopic Procedure  Patient Name: Sindhu Diaz  Patient MRN: [id number]  Patient YOB: 1970 Wednesday, March 07, 2018  Triston Gore MD  RESTRICTIONS:  During your procedure today, you received medications for sedation.  These   medications may affect your judgment, balance and coordination.  Therefore,   for 24 hours, you have the following restrictions:   - DO NOT drive a car, operate machinery, make legal/financial decisions,   sign important papers or drink alcohol.    ACTIVITY:  The following day: return to full activity including work, except no heavy   lifting, straining or running for 3 days if polyps were removed.  DIET:  Eat and drink normally unless instructed otherwise.     TREATMENT FOR COMMON SIDE EFFECTS:  - Mild abdominal pain, nausea, belching, bloating or excessive gas:  rest,   eat lightly and use a heating pad.  - Sore Throat: treat with throat lozenges and/or gargle with warm salt   water.  - Because air was used during the procedure, expelling large amounts of air   from your rectum or belching is normal.  - If a bowel prep was taken, you may not have a bowel movement for 1-3 days.    This is normal.  SYMPTOMS TO WATCH FOR AND REPORT TO YOUR PHYSICIAN:  1. Abdominal pain or bloating, other than gas cramps.  2. Chest pain.  3. Back pain.  4. Signs of infection such as: chills or fever occurring within 24 hours   after the procedure.  5. Rectal bleeding, which would show as bright red, maroon, or black stools.   (A tablespoon of blood from the rectum is not serious, especially if   hemorrhoids are present.)  6. Vomiting.  7. Weakness or dizziness.  GO DIRECTLY TO THE NEAREST EMERGENCY ROOM IF YOU HAVE ANY OF THE FOLLOWING:      Difficulty breathing  Chills and/or fever over 101 F   Persistent vomiting and/or vomiting blood   Severe abdominal pain   Severe chest pain   Black, tarry stools   Bleeding- more than one tablespoon   Any other  symptom or condition that you feel may need urgent attention  Your doctor recommends these additional instructions:  If any biopsies were taken, your doctors clinic will contact you in 1 to 2   weeks with any results.  You have a contact number available for emergencies.  The signs and symptoms   of potential delayed complications were discussed with you.  You may return   to normal activities tomorrow.  Written discharge instructions were   provided to you.   You are being discharged to home.   Resume your previous diet.   Continue your present medications.   We are waiting for your pathology results.  For questions, problems or results please call your physician - Triston Gore MD at Work:  [phone].  [facility], EMERGENCY ROOM PHONE NUMBER: [phone]  IF A COMPLICATION OR EMERGENCY SITUATION ARISES AND YOU ARE UNABLE TO REACH   YOUR PHYSICIAN - GO DIRECTLY TO THE EMERGENCY ROOM.  Triston Gore MD  3/7/2018 9:55:11 AM  This report has been verified and signed electronically.

## 2018-03-07 NOTE — H&P
Short Stay Endoscopy History and Physical    PCP - ADRIANA Carson MD    Procedure - EGD with Bravo and esophageal manometry  Sedation: GA  ASA - per anesthesia  Mallampati - per anesthesia  History of Anesthesia problems - no  Family history Anesthesia problems -  no     HPI:  This is a 47 y.o. female here for evaluation of : GERD and epigastric abdominal pain    Reflux - yes  Dysphagia - no  Abdominal pain - yes  Diarrhea - no    ROS:  Constitutional: No fevers, chills, No weight loss  ENT: No allergies  CV: No chest pain  Pulm: No cough, No shortness of breath  Ophtho: No vision changes  GI: see HPI  Medical History:  has a past medical history of Asthma; GERD (gastroesophageal reflux disease); Hyperlipidemia; IBS (irritable bowel syndrome); Tension headache, chronic; and Vaginal delivery.    Surgical History:  has a past surgical history that includes Hysterectomy and Cholecystectomy.    Family History: family history includes Heart attack in her father; Heart disease in her sister; Lymphoma in her mother.. Otherwise no colon cancer, inflammatory bowel disease, or GI malignancies.    Social History:  reports that she quit smoking about 16 years ago. Her smoking use included Cigarettes. She quit after 15.00 years of use. She has never used smokeless tobacco. She reports that she does not drink alcohol or use drugs.    Review of patient's allergies indicates:   Allergen Reactions    Lipitor [atorvastatin]        Medications:   Prescriptions Prior to Admission   Medication Sig Dispense Refill Last Dose    aspirin 81 MG Chew Take 1 tablet (81 mg total) by mouth once daily.  0 3/6/2018 at Unknown time    estradiol (ESTRACE) 0.5 MG tablet TK 1 T PO QD  3 Past Week at Unknown time    gabapentin (NEURONTIN) 300 MG capsule Take 300 mg by mouth continuous prn.   3 Past Week at Unknown time    hydrocodone-acetaminophen 10-325mg (NORCO)  mg Tab   0 Past Week at Unknown time    LINZESS 290 mcg Cap TK ONE C PO QD  ON EMPTY STOMACH  2 Past Month at Unknown time    methylPREDNISolone (MEDROL DOSEPACK) 4 mg tablet Take as directed on the package. 1 Package 0 Past Month at Unknown time    metoprolol succinate (TOPROL-XL) 25 MG 24 hr tablet Take 1 tablet (25 mg total) by mouth once daily. 30 tablet 11 Past Week at Unknown time    ondansetron (ZOFRAN) 4 MG tablet Take 1 tablet (4 mg total) by mouth every 6 (six) hours as needed for Nausea. 12 tablet 0 Past Month at Unknown time    oxybutynin (DITROPAN XL) 15 MG TR24 TK 1 T PO BID  0 Past Month at Unknown time    PROAIR HFA 90 mcg/actuation inhaler Inhale 1 puff into the lungs every 4 (four) hours as needed.   0 3/6/2018 at Unknown time    simvastatin (ZOCOR) 40 MG tablet   5 3/6/2018 at Unknown time    SYMBICORT 160-4.5 mcg/actuation HFAA Inhale 2 puffs into the lungs once daily.   0 Past Week at Unknown time    theophylline (THEODUR) 300 MG 12 hr tablet TK 1 T PO QD  0 Past Month at Unknown time    tramadol (ULTRAM) 50 mg tablet   1 Past Week at Unknown time    pantoprazole (PROTONIX) 40 MG tablet Take 1 tablet (40 mg total) by mouth once daily. 30 tablet 3 Taking    sumatriptan (IMITREX) 50 MG tablet May repeat once after 2 hours if needed. Do not exceed 4 tabs in 1 week 12 tablet 3 Unknown at Unknown time       Objective Findings:    Vital Signs: Per nursing notes.    Physical Exam:  General Appearance: Well appearing in no acute distress  Head:   Normocephalic, without obvious abnormality  Eyes:    No scleral icterus  Airway: Open  Neck: No restriction in mobility  Lungs: CTA bilaterally in anterior and posterior fields, no wheezes, no crackles.  Heart:  Regular rate and rhythm, S1, S2 normal, no murmurs heard  Abdomen: Soft, non tender, non distended      Labs:  Lab Results   Component Value Date    WBC 5.69 12/28/2017    HGB 12.4 12/28/2017    HCT 38.4 12/28/2017     12/28/2017    CHOL 259 (H) 12/15/2017    TRIG 151 (H) 12/15/2017    HDL 48 12/15/2017    ALT  29 12/28/2017    AST 17 12/28/2017     12/28/2017    K 3.7 12/28/2017     12/28/2017    CREATININE 0.69 12/28/2017    BUN 10 12/28/2017    CO2 27 12/28/2017    TSH 2.660 12/15/2017    INR 0.9 04/10/2017    HGBA1C 5.3 12/15/2017         I have explained the risks and benefits of endoscopy procedures to the patient including but not limited to bleeding, perforation, infection, and death.    Thank you so much for allowing me to participate in the care of Sindhu Gore MD

## 2018-03-12 ENCOUNTER — TELEPHONE (OUTPATIENT)
Dept: GASTROENTEROLOGY | Facility: CLINIC | Age: 48
End: 2018-03-12

## 2018-03-12 NOTE — TELEPHONE ENCOUNTER
----- Message from Triston Gore MD sent at 3/12/2018  8:31 AM CDT -----  Please notify patient, the stomach biopsies did not reveal any H.pylori.

## 2018-03-14 ENCOUNTER — TELEPHONE (OUTPATIENT)
Dept: ENDOSCOPY | Facility: HOSPITAL | Age: 48
End: 2018-03-14

## 2018-03-14 PROCEDURE — 91035 G-ESOPH REFLX TST W/ELECTROD: CPT | Mod: 26,,, | Performed by: INTERNAL MEDICINE

## 2018-03-15 ENCOUNTER — TELEPHONE (OUTPATIENT)
Dept: GASTROENTEROLOGY | Facility: CLINIC | Age: 48
End: 2018-03-15

## 2018-03-15 NOTE — TELEPHONE ENCOUNTER
Please make her an appointment to follow up if she is still having symptoms.    Dr. Ramirez ordered additional studies including ph and was unsure what was causing her symptoms after work up.    We can discuss further if she would like.  I am not sure if she has a FU with Dr. Ramirez.

## 2018-03-15 NOTE — TELEPHONE ENCOUNTER
Spoke with pt and she stated that she has some difficulty swallowing after her procedure. She said that she will get in contact with Dr. Ramirez's office today to schedule a f/u. She said that she will call us when she is ready to f/u with Pat. Verbal Understanding.

## 2018-03-29 ENCOUNTER — TELEPHONE (OUTPATIENT)
Dept: ORTHOPEDICS | Facility: CLINIC | Age: 48
End: 2018-03-29

## 2018-03-29 NOTE — TELEPHONE ENCOUNTER
Spoke with patient to schedule appointment. She was made aware of date, time, and location. Verbalized understanding.    ----- Message from Jcarols Contreras sent at 3/29/2018 12:12 PM CDT -----  Contact: [phone]   Patient would like to be seen sooner than the next available appointment. Please advise.

## 2018-04-04 ENCOUNTER — OFFICE VISIT (OUTPATIENT)
Dept: ORTHOPEDICS | Facility: CLINIC | Age: 48
End: 2018-04-04
Payer: MEDICARE

## 2018-04-04 DIAGNOSIS — S80.12XA CONTUSION OF LEFT LOWER EXTREMITY, INITIAL ENCOUNTER: Primary | ICD-10-CM

## 2018-04-04 PROCEDURE — 99499 UNLISTED E&M SERVICE: CPT | Mod: S$GLB,,, | Performed by: ORTHOPAEDIC SURGERY

## 2018-04-04 PROCEDURE — 99212 OFFICE O/P EST SF 10 MIN: CPT | Mod: PBBFAC,PO | Performed by: ORTHOPAEDIC SURGERY

## 2018-04-04 PROCEDURE — 99999 PR PBB SHADOW E&M-EST. PATIENT-LVL II: CPT | Mod: PBBFAC,,, | Performed by: ORTHOPAEDIC SURGERY

## 2018-05-23 NOTE — PROGRESS NOTES
Subjective:      Patient ID: Sindhu Diaz is a 47 y.o. female.    Chief Complaint: Pain of the Left Lower Leg    HPI    ROS      Objective:            Ortho/SPM Exam            Assessment:       No diagnosis found.       Plan:       There are no diagnoses linked to this encounter.  Cancelled appt

## 2018-06-27 ENCOUNTER — OFFICE VISIT (OUTPATIENT)
Dept: NEUROLOGY | Facility: CLINIC | Age: 48
End: 2018-06-27
Payer: MEDICARE

## 2018-06-27 ENCOUNTER — TELEPHONE (OUTPATIENT)
Dept: NEUROLOGY | Facility: CLINIC | Age: 48
End: 2018-06-27

## 2018-06-27 VITALS
HEART RATE: 76 BPM | SYSTOLIC BLOOD PRESSURE: 134 MMHG | HEIGHT: 71 IN | WEIGHT: 216.25 LBS | BODY MASS INDEX: 30.27 KG/M2 | DIASTOLIC BLOOD PRESSURE: 74 MMHG

## 2018-06-27 DIAGNOSIS — R29.898 LEFT LEG WEAKNESS: Primary | ICD-10-CM

## 2018-06-27 DIAGNOSIS — G44.40 MEDICATION OVERUSE HEADACHE: ICD-10-CM

## 2018-06-27 PROCEDURE — 3078F DIAST BP <80 MM HG: CPT | Mod: CPTII,S$GLB,, | Performed by: PSYCHIATRY & NEUROLOGY

## 2018-06-27 PROCEDURE — 99999 PR PBB SHADOW E&M-EST. PATIENT-LVL III: CPT | Mod: PBBFAC,,, | Performed by: PSYCHIATRY & NEUROLOGY

## 2018-06-27 PROCEDURE — 3075F SYST BP GE 130 - 139MM HG: CPT | Mod: CPTII,S$GLB,, | Performed by: PSYCHIATRY & NEUROLOGY

## 2018-06-27 PROCEDURE — 99214 OFFICE O/P EST MOD 30 MIN: CPT | Mod: S$GLB,,, | Performed by: PSYCHIATRY & NEUROLOGY

## 2018-06-27 PROCEDURE — 3008F BODY MASS INDEX DOCD: CPT | Mod: CPTII,S$GLB,, | Performed by: PSYCHIATRY & NEUROLOGY

## 2018-06-27 NOTE — TELEPHONE ENCOUNTER
----- Message from Jcarlos Contreras sent at 6/27/2018  3:13 PM CDT -----  Contact: [phone]  Patient called in requesting to speak with you. Patient prefers to speak with a nurse. Please advise.

## 2018-06-28 NOTE — PROGRESS NOTES
[facility] NEUROLOGY  [facility] [place]    Date: June 27, 2018   Patient Name: Sindhu Diaz   MRN: [id number]   PCP: ADRIANA Carson  Referring Provider: Self, Aaareferral    Assessment:   Sindhu Diaz is a 47 y.o. female presenting with chronic headaches now presenting with left lower extremity weakness and pain.  Will obtain MRI L-spine to rule out underlying to the causes.  Advised patient overuse BC pattern she expressed understanding this.  Patient is willing to stop all BC knees as additional over-the-counter analgesics this visit.  May consider additional prophylactic options pending her response to this.  Plan:     Problem List Items Addressed This Visit        Neuro    Medication overuse headache    Current Assessment & Plan     -- patient again counseled on cessation of BC powder prior to consideration of additional prophylactic agents           Other Visit Diagnoses     Left leg weakness    -  Primary    Relevant Orders    MRI Lumbar Spine Without Contrast          Regino Echeverria MD  [facility]   Department of Neurology    Patient note was created using Dragon Dictation.  Any errors in syntax or even information may not have been identified and edited on initial review prior to signing this note.  Subjective:        HPI:   Ms. Sindhu Diaz is a 47 y.o. female who presents with a chief complaint of chronic headaches.  The patient continues to report daily BC Powder use despite recommendations to stop overuse as it is likely contributing to rebound headache.  She underwent sleep apnea evaluation was recommended that she wear CPAP however she states insurance would not approve the device for her.  Today she also endorses left lower extremity weakness and stating that she is experiencing burning and stabbing pain on the lateral aspect her left extremity with frequent episodes of her left leg giving    PAST MEDICAL HISTORY:  Past Medical History:   Diagnosis Date    Asthma      GERD (gastroesophageal reflux disease)     Hyperlipidemia     IBS (irritable bowel syndrome)     Tension headache, chronic     Vaginal delivery     x3     PAST SURGICAL HISTORY:  Past Surgical History:   Procedure Laterality Date    CHOLECYSTECTOMY      HYSTERECTOMY       CURRENT MEDS:  Current Outpatient Prescriptions   Medication Sig Dispense Refill    estradiol (ESTRACE) 0.5 MG tablet TK 1 T PO QD  3    gabapentin (NEURONTIN) 300 MG capsule Take 300 mg by mouth continuous prn.   3    hydrocodone-acetaminophen 10-325mg (NORCO)  mg Tab   0    LINZESS 290 mcg Cap TK ONE C PO QD ON EMPTY STOMACH  2    ondansetron (ZOFRAN) 4 MG tablet Take 1 tablet (4 mg total) by mouth every 6 (six) hours as needed for Nausea. 12 tablet 0    oxybutynin (DITROPAN XL) 15 MG TR24 TK 1 T PO BID  0    PROAIR HFA 90 mcg/actuation inhaler Inhale 1 puff into the lungs every 4 (four) hours as needed.   0    simvastatin (ZOCOR) 40 MG tablet   5    sumatriptan (IMITREX) 50 MG tablet May repeat once after 2 hours if needed. Do not exceed 4 tabs in 1 week 12 tablet 3    SYMBICORT 160-4.5 mcg/actuation HFAA Inhale 2 puffs into the lungs once daily.   0    theophylline (THEODUR) 300 MG 12 hr tablet TK 1 T PO QD  0    tramadol (ULTRAM) 50 mg tablet   1    aspirin 81 MG Chew Take 1 tablet (81 mg total) by mouth once daily.  0    metoprolol succinate (TOPROL-XL) 25 MG 24 hr tablet Take 1 tablet (25 mg total) by mouth once daily. 30 tablet 11    pantoprazole (PROTONIX) 40 MG tablet Take 1 tablet (40 mg total) by mouth once daily. 30 tablet 3     No current facility-administered medications for this visit.      ALLERGIES:  Review of patient's allergies indicates:  No Known Allergies    FAMILY HISTORY:  Family History   Problem Relation Age of Onset    Heart attack Father         at 70     Lymphoma Mother     Heart disease Sister     Breast cancer Neg Hx     Colon cancer Neg Hx     Ovarian cancer Neg Hx        SOCIAL  "HISTORY:  Social History   Substance Use Topics    Smoking status: Former Smoker     Years: 15.00     Types: Cigarettes     Quit date: 2002    Smokeless tobacco: Never Used      Comment: quit when she was 33 yrs old.    Alcohol use No      Comment: rarely       Review of Systems:  12 review of systems is negative except for the symptoms mentioned in HPI.      Objective:     Vitals:    06/27/18 1420   BP: 134/74   Pulse: 76   Weight: 98.1 kg (216 lb 4.3 oz)   Height: 5' 11" (1.803 m)     General: NAD, well nourished   Eyes: no tearing, discharge, no erythema   ENT: moist mucous membranes of the oral cavity, nares patent    Neck: Supple, full range of motion  Cardiovascular: Warm and well perfused, pulses equal and symmetrical  Lungs: Normal work of breathing, normal chest wall excursions  Skin: No rash, lesions, or breakdown on exposed skin  Psychiatry: Mood and affect are appropriate   Abdomen: soft, non tender, non distended  Extremeties: No cyanosis, clubbing or edema.    Neurological   MENTAL STATUS: Alert and oriented to person, place, and time. Attention and concentration within normal limits. Speech without dysarthria, able to name and repeat without difficulty. Recent and remote memory within normal limits   CRANIAL NERVES: Visual fields intact. PERRL. EOMI. Facial sensation intact. Face symmetrical. Hearing grossly intact. Full shoulder shrug bilaterally. Tongue protrudes midline   SENSORY: Sensation is intact to light touch throughout.   MOTOR: Normal bulk and tone. 5/5 deltoid, biceps, triceps, interosseous, hand  bilaterally. 5/5 R and 4/5 l iliopsoas, 5/5 knee extension/flexion, foot dorsi/plantarflexion bilaterally.    REFLEXES: Symmetric and 2+ throughout.  CEREBELLAR/COORDINATION/GAIT: Gait steady with normal arm swing and stride length.  Heel to shin intact. Finger to nose intact. Normal rapid alternating movements.       "

## 2018-06-28 NOTE — ASSESSMENT & PLAN NOTE
-- patient again counseled on cessation of BC powder prior to consideration of additional prophylactic agents

## 2018-06-28 NOTE — PATIENT INSTRUCTIONS
"  Self-Care for Headaches  Most headaches aren't serious and can be relieved with self-care. But some headaches may be a sign of another health problem like eye trouble or high blood pressure. To find the best treatment, learn what kind of headaches you get. For tension headaches, self-care will usually help. To treat migraines, ask your healthcare provider for advice. It is also possible to get both tension and migraine headaches. Self-care involves relieving the pain and avoiding headache triggers if you can.    Ways to reduce pain and tension  Try these steps:  · Apply a cold compress or ice pack to the pain site.  · Drink fluids. If nausea makes it hard to drink, try sucking on ice.  · Rest. Protect yourself from bright light and loud noises.  · Calm your emotions by imagining a peaceful scene.  · Massage tight neck, shoulder, and head muscles.  · To relax muscles, soak in a hot bath or use a hot shower.  Use medicines  Aspirin or aspirin substitutes, such as ibuprofen and acetaminophen, can relieve headache. Remember: Never give aspirin to anyone 18 years old or younger because of the risk of developing Reye syndrome. Use pain medicines only when necessary.  Track your headaches  Keeping a headache diary can help you and your healthcare provider identify what's causing your headaches:  · Note when each headache happens.  · Identify your activities and the foods you've eaten 6 to 8 hours before the headache began.  · Look for any trends or "triggers."  Signs of tension headache  Any of the following can be signs:  · Dull pain or feeling of pressure in a tight band around your head  · Pain in your neck or shoulders  · Headache without a definite beginning or end  · Headache after an activity such as driving or working on a computer  Signs of migraine  Any of the following can be signs:  · Throbbing pain on one or both sides of your head  · Nausea or vomiting  · Extreme sensitivity to light, sound, and " "smells  · Bright spots, flashes, or other visual changes  · Pain or nausea so severe that you can't continue your daily activities  Call your healthcare provider   If you have any of the following symptoms, contact your healthcare provider:  · A headache that lingers after a recent injury or bump to the head.  · A fever with a stiff neck or pain when you bend your head toward your chest.  · A headache along with slurred speech, changes in your vision, or numbness or weakness in your arms or legs.  · A headache for longer than 3 days.  · Frequent headaches, especially in the morning.  · Headaches with seizures   · Seek immediate medical attention if you have a headache that you would call "the worst headache you have ever had."   Date Last Reviewed: 10/4/2015  © 6603-1431 [employer]. [address] [place] [address], [place] [address]. All rights reserved. This information is not intended as a substitute for professional medical care. Always follow your healthcare professional's instructions.        "

## 2018-06-29 ENCOUNTER — TELEPHONE (OUTPATIENT)
Dept: NEUROLOGY | Facility: CLINIC | Age: 48
End: 2018-06-29

## 2018-06-29 RX ORDER — CYCLOBENZAPRINE HCL 5 MG
5 TABLET ORAL 3 TIMES DAILY PRN
Qty: 30 TABLET | Refills: 2 | Status: SHIPPED | OUTPATIENT
Start: 2018-06-29 | End: 2018-07-09

## 2018-06-29 NOTE — TELEPHONE ENCOUNTER
----- Message from Rosalinda Mendez sent at 6/28/2018  1:45 PM CDT -----  Contact: Self. [phone]  Patient would like to speak with you about a personal matter. Please advise
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
## 2018-07-09 DIAGNOSIS — M54.30 SCIATICA, UNSPECIFIED LATERALITY: Primary | ICD-10-CM

## 2018-08-07 ENCOUNTER — TELEPHONE (OUTPATIENT)
Dept: GASTROENTEROLOGY | Facility: CLINIC | Age: 48
End: 2018-08-07

## 2018-08-07 ENCOUNTER — TELEPHONE (OUTPATIENT)
Dept: NEUROLOGY | Facility: CLINIC | Age: 48
End: 2018-08-07

## 2018-08-07 NOTE — TELEPHONE ENCOUNTER
Patient requesting to nohemi a colonoscopy @[facility], please call to schedule  [phone]

## 2018-08-07 NOTE — TELEPHONE ENCOUNTER
The patient was told the therapist does not feel is is a pinched nerve, She wants to discuss this with you. She does not want to deal with the therapy

## 2018-08-08 ENCOUNTER — TELEPHONE (OUTPATIENT)
Dept: GASTROENTEROLOGY | Facility: CLINIC | Age: 48
End: 2018-08-08

## 2018-08-08 DIAGNOSIS — Z12.11 ENCOUNTER FOR SCREENING COLONOSCOPY: Primary | ICD-10-CM

## 2018-08-08 RX ORDER — SODIUM, POTASSIUM,MAG SULFATES 17.5-3.13G
1 SOLUTION, RECONSTITUTED, ORAL ORAL DAILY
Qty: 1 KIT | Refills: 0 | Status: SHIPPED | OUTPATIENT
Start: 2018-08-08 | End: 2018-08-10

## 2018-08-08 NOTE — TELEPHONE ENCOUNTER
Referring Physician: Dr.R. De La Rosa                             Date: 8/14/2018      Reason for Referral: Screening Colonoscopy      Family History of: No  Colon polyp:   Relationship/Age of Onset:       Colon cancer: No  Relationship/Age of Onset:       Patient with:   Hemoccults Done: No      Iron deficient:  No      On Blood Thinner: No      Valvular heart disease/valve replacement: No      Anemia Present: No      On NSAID: ASA 81 mg      Lung disease: No      Kidney disease: No      Hx of polyps: No      Hx of colon cancer: No      Previous colon evalations: None  When:   Where:   Pertinent symptoms:           Review of patient's allergies indicates: Yes        Patient was scheduled for colonoscopy on 8/17/18      with  at [facility]. Prep instructions  were reviewed with patient.

## 2018-08-17 PROBLEM — Z12.11 SCREEN FOR COLON CANCER: Status: ACTIVE | Noted: 2018-08-17

## 2018-08-22 ENCOUNTER — OFFICE VISIT (OUTPATIENT)
Dept: CARDIOLOGY | Facility: CLINIC | Age: 48
End: 2018-08-22
Payer: MEDICARE

## 2018-08-22 VITALS
HEART RATE: 73 BPM | WEIGHT: 212.5 LBS | SYSTOLIC BLOOD PRESSURE: 132 MMHG | BODY MASS INDEX: 29.75 KG/M2 | HEIGHT: 71 IN | DIASTOLIC BLOOD PRESSURE: 62 MMHG | OXYGEN SATURATION: 98 %

## 2018-08-22 DIAGNOSIS — I10 HTN (HYPERTENSION): ICD-10-CM

## 2018-08-22 DIAGNOSIS — E78.5 HYPERLIPIDEMIA, UNSPECIFIED HYPERLIPIDEMIA TYPE: ICD-10-CM

## 2018-08-22 DIAGNOSIS — R00.2 PALPITATIONS: ICD-10-CM

## 2018-08-22 DIAGNOSIS — R07.9 CHEST PAIN, UNSPECIFIED TYPE: ICD-10-CM

## 2018-08-22 DIAGNOSIS — I35.0 AORTIC VALVE STENOSIS, ETIOLOGY OF CARDIAC VALVE DISEASE UNSPECIFIED: Primary | ICD-10-CM

## 2018-08-22 PROCEDURE — 93010 ELECTROCARDIOGRAM REPORT: CPT | Mod: S$GLB,,, | Performed by: INTERNAL MEDICINE

## 2018-08-22 PROCEDURE — 3078F DIAST BP <80 MM HG: CPT | Mod: CPTII,S$GLB,, | Performed by: INTERNAL MEDICINE

## 2018-08-22 PROCEDURE — 3008F BODY MASS INDEX DOCD: CPT | Mod: CPTII,S$GLB,, | Performed by: INTERNAL MEDICINE

## 2018-08-22 PROCEDURE — 99999 PR PBB SHADOW E&M-EST. PATIENT-LVL IV: CPT | Mod: PBBFAC,,, | Performed by: INTERNAL MEDICINE

## 2018-08-22 PROCEDURE — 99214 OFFICE O/P EST MOD 30 MIN: CPT | Mod: S$GLB,,, | Performed by: INTERNAL MEDICINE

## 2018-08-22 PROCEDURE — 3075F SYST BP GE 130 - 139MM HG: CPT | Mod: CPTII,S$GLB,, | Performed by: INTERNAL MEDICINE

## 2018-08-22 NOTE — PROGRESS NOTES
Subjective:    Patient ID:  Sindhu Diaz is a 48 y.o. female who presents for follow-up of Chest Pain      HPI     CP, asthma, palpitations     4/11/17 LHC - EDP 9, EF 60%, normal coronaries     Echo 1/17/18    1 - Normal left ventricular systolic function (EF 55-60%).     2 - Normal right ventricular systolic function .     3 - Normal left ventricular diastolic function.     4 - No wall motion abnormalities.     5.  Normal valvular function     Stress test 4/3/17  LVEF: 67 %  Impression: ABNORMAL MYOCARDIAL PERFUSION  1. There is evidence for moderate myocardial ischemia in the anterior wall of the left ventricle.   2. The perfusion scan is free of evidence for myocardial injury.   3. Resting wall motion is physiologic.   4. Resting LV function is normal.      Holter 1/17/18 - average HR 76, Few PVCs and PACS, no SVT or VT    Still gets occasional palpitations  Denies CP or SOB  EKG NSR - ok          Review of Systems   Constitution: Negative for decreased appetite.   HENT: Negative for ear discharge.    Eyes: Negative for blurred vision.   Respiratory: Negative for hemoptysis.    Endocrine: Negative for polyphagia.   Hematologic/Lymphatic: Negative for adenopathy.   Skin: Negative for color change.   Musculoskeletal: Negative for joint swelling.   Neurological: Negative for brief paralysis.   Psychiatric/Behavioral: Negative for hallucinations.        Objective:    Physical Exam   Constitutional: She is oriented to person, place, and time. She appears well-developed and well-nourished.   HENT:   Head: Normocephalic and atraumatic.   Eyes: Conjunctivae are normal. Pupils are equal, round, and reactive to light.   Neck: Normal range of motion. Neck supple.   Cardiovascular: Normal rate, normal heart sounds and intact distal pulses.   Pulmonary/Chest: Effort normal and breath sounds normal.   Abdominal: Soft. Bowel sounds are normal.   Musculoskeletal: Normal range of motion.   Neurological: She is alert and  oriented to person, place, and time.   Skin: Skin is warm and dry.         Assessment:       1. Aortic valve stenosis, etiology of cardiac valve disease unspecified    2. Hyperlipidemia, unspecified hyperlipidemia type    3. Palpitations    4. Chest pain, unspecified type         Plan:       Cardiac stable  OV 1 year

## 2018-09-10 ENCOUNTER — OFFICE VISIT (OUTPATIENT)
Dept: NEUROLOGY | Facility: CLINIC | Age: 48
End: 2018-09-10
Payer: MEDICARE

## 2018-09-10 VITALS
HEART RATE: 77 BPM | DIASTOLIC BLOOD PRESSURE: 74 MMHG | SYSTOLIC BLOOD PRESSURE: 131 MMHG | BODY MASS INDEX: 29.75 KG/M2 | HEIGHT: 71 IN | WEIGHT: 212.5 LBS

## 2018-09-10 DIAGNOSIS — G57.12 MERALGIA PARESTHETICA OF LEFT SIDE: ICD-10-CM

## 2018-09-10 DIAGNOSIS — G43.019 INTRACTABLE MIGRAINE WITHOUT AURA AND WITHOUT STATUS MIGRAINOSUS: ICD-10-CM

## 2018-09-10 PROCEDURE — 3008F BODY MASS INDEX DOCD: CPT | Mod: CPTII,,, | Performed by: PSYCHIATRY & NEUROLOGY

## 2018-09-10 PROCEDURE — 3078F DIAST BP <80 MM HG: CPT | Mod: CPTII,,, | Performed by: PSYCHIATRY & NEUROLOGY

## 2018-09-10 PROCEDURE — 99214 OFFICE O/P EST MOD 30 MIN: CPT | Mod: S$PBB,,, | Performed by: PSYCHIATRY & NEUROLOGY

## 2018-09-10 PROCEDURE — 99214 OFFICE O/P EST MOD 30 MIN: CPT | Mod: PBBFAC,PO | Performed by: PSYCHIATRY & NEUROLOGY

## 2018-09-10 PROCEDURE — 3075F SYST BP GE 130 - 139MM HG: CPT | Mod: CPTII,,, | Performed by: PSYCHIATRY & NEUROLOGY

## 2018-09-10 PROCEDURE — 99999 PR PBB SHADOW E&M-EST. PATIENT-LVL IV: CPT | Mod: PBBFAC,,, | Performed by: PSYCHIATRY & NEUROLOGY

## 2018-09-10 RX ORDER — SUMATRIPTAN 50 MG/1
TABLET, FILM COATED ORAL
Qty: 12 TABLET | Refills: 3 | Status: SHIPPED | OUTPATIENT
Start: 2018-09-10 | End: 2019-09-05

## 2018-09-10 RX ORDER — TOPIRAMATE 25 MG/1
TABLET ORAL
Qty: 180 TABLET | Refills: 3 | Status: SHIPPED | OUTPATIENT
Start: 2018-09-10 | End: 2020-01-07

## 2018-09-10 NOTE — PATIENT INSTRUCTIONS
"  Self-Care for Headaches  Most headaches aren't serious and can be relieved with self-care. But some headaches may be a sign of another health problem like eye trouble or high blood pressure. To find the best treatment, learn what kind of headaches you get. For tension headaches, self-care will usually help. To treat migraines, ask your healthcare provider for advice. It is also possible to get both tension and migraine headaches. Self-care involves relieving the pain and avoiding headache triggers if you can.    Ways to reduce pain and tension  Try these steps:  · Apply a cold compress or ice pack to the pain site.  · Drink fluids. If nausea makes it hard to drink, try sucking on ice.  · Rest. Protect yourself from bright light and loud noises.  · Calm your emotions by imagining a peaceful scene.  · Massage tight neck, shoulder, and head muscles.  · To relax muscles, soak in a hot bath or use a hot shower.  Use medicines  Aspirin or aspirin substitutes, such as ibuprofen and acetaminophen, can relieve headache. Remember: Never give aspirin to anyone 18 years old or younger because of the risk of developing Reye syndrome. Use pain medicines only when necessary.  Track your headaches  Keeping a headache diary can help you and your healthcare provider identify what's causing your headaches:  · Note when each headache happens.  · Identify your activities and the foods you've eaten 6 to 8 hours before the headache began.  · Look for any trends or "triggers."  Signs of tension headache  Any of the following can be signs:  · Dull pain or feeling of pressure in a tight band around your head  · Pain in your neck or shoulders  · Headache without a definite beginning or end  · Headache after an activity such as driving or working on a computer  Signs of migraine  Any of the following can be signs:  · Throbbing pain on one or both sides of your head  · Nausea or vomiting  · Extreme sensitivity to light, sound, and " "smells  · Bright spots, flashes, or other visual changes  · Pain or nausea so severe that you can't continue your daily activities  Call your healthcare provider   If you have any of the following symptoms, contact your healthcare provider:  · A headache that lingers after a recent injury or bump to the head.  · A fever with a stiff neck or pain when you bend your head toward your chest.  · A headache along with slurred speech, changes in your vision, or numbness or weakness in your arms or legs.  · A headache for longer than 3 days.  · Frequent headaches, especially in the morning.  · Headaches with seizures   · Seek immediate medical attention if you have a headache that you would call "the worst headache you have ever had."   Date Last Reviewed: 10/4/2015  © 4237-9725 [employer], [employer]. [address] [place] [address], [place] [address]. All rights reserved. This information is not intended as a substitute for professional medical care. Always follow your healthcare professional's instructions.        "
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
## 2018-11-02 ENCOUNTER — HOSPITAL ENCOUNTER (EMERGENCY)
Facility: OTHER | Age: 48
Discharge: HOME | End: 2018-11-02
Attending: EMERGENCY MEDICINE
Payer: MEDICARE

## 2018-11-02 VITALS
TEMPERATURE: 99 F | DIASTOLIC BLOOD PRESSURE: 64 MMHG | OXYGEN SATURATION: 100 % | RESPIRATION RATE: 20 BRPM | BODY MASS INDEX: 30.1 KG/M2 | SYSTOLIC BLOOD PRESSURE: 128 MMHG | HEIGHT: 71 IN | HEART RATE: 64 BPM | WEIGHT: 215 LBS

## 2018-11-02 DIAGNOSIS — R10.84 GENERALIZED ABDOMINAL PAIN: ICD-10-CM

## 2018-11-02 DIAGNOSIS — R53.1 WEAKNESS: ICD-10-CM

## 2018-11-02 DIAGNOSIS — E86.0 DEHYDRATION: Primary | ICD-10-CM

## 2018-11-02 DIAGNOSIS — R19.7 DIARRHEA, UNSPECIFIED TYPE: ICD-10-CM

## 2018-11-02 LAB
ALBUMIN SERPL BCP-MCNC: 4.1 G/DL
ALP SERPL-CCNC: 64 U/L
ALT SERPL W/O P-5'-P-CCNC: 9 U/L
ANION GAP SERPL CALC-SCNC: 11 MMOL/L
AST SERPL-CCNC: 17 U/L
BASOPHILS # BLD AUTO: 0.02 K/UL
BASOPHILS NFR BLD: 0.4 %
BILIRUB SERPL-MCNC: 1.4 MG/DL
BILIRUB UR QL STRIP: NEGATIVE
BUN SERPL-MCNC: 8 MG/DL
CALCIUM SERPL-MCNC: 9.2 MG/DL
CHLORIDE SERPL-SCNC: 101 MMOL/L
CLARITY UR: CLEAR
CO2 SERPL-SCNC: 24 MMOL/L
COLOR UR: YELLOW
CREAT SERPL-MCNC: 0.8 MG/DL
DIFFERENTIAL METHOD: ABNORMAL
EOSINOPHIL # BLD AUTO: 0 K/UL
EOSINOPHIL NFR BLD: 0.4 %
ERYTHROCYTE [DISTWIDTH] IN BLOOD BY AUTOMATED COUNT: 14 %
EST. GFR  (AFRICAN AMERICAN): >60 ML/MIN/1.73 M^2
EST. GFR  (NON AFRICAN AMERICAN): >60 ML/MIN/1.73 M^2
GLUCOSE SERPL-MCNC: 122 MG/DL
GLUCOSE UR QL STRIP: NEGATIVE
HCT VFR BLD AUTO: 39.2 %
HGB BLD-MCNC: 12.5 G/DL
HGB UR QL STRIP: NEGATIVE
KETONES UR QL STRIP: NEGATIVE
LEUKOCYTE ESTERASE UR QL STRIP: NEGATIVE
LIPASE SERPL-CCNC: 12 U/L
LYMPHOCYTES # BLD AUTO: 1.8 K/UL
LYMPHOCYTES NFR BLD: 34.8 %
MCH RBC QN AUTO: 27.8 PG
MCHC RBC AUTO-ENTMCNC: 31.9 G/DL
MCV RBC AUTO: 87 FL
MONOCYTES # BLD AUTO: 0.3 K/UL
MONOCYTES NFR BLD: 6.2 %
NEUTROPHILS # BLD AUTO: 2.9 K/UL
NEUTROPHILS NFR BLD: 58.2 %
NITRITE UR QL STRIP: NEGATIVE
PH UR STRIP: 7 [PH] (ref 5–8)
PLATELET # BLD AUTO: 320 K/UL
PMV BLD AUTO: 10.3 FL
POTASSIUM SERPL-SCNC: 3.3 MMOL/L
PROT SERPL-MCNC: 7.8 G/DL
PROT UR QL STRIP: NEGATIVE
RBC # BLD AUTO: 4.5 M/UL
SODIUM SERPL-SCNC: 136 MMOL/L
SP GR UR STRIP: <=1.005 (ref 1–1.03)
URN SPEC COLLECT METH UR: ABNORMAL
UROBILINOGEN UR STRIP-ACNC: NEGATIVE EU/DL
WBC # BLD AUTO: 5.03 K/UL

## 2018-11-02 PROCEDURE — 83690 ASSAY OF LIPASE: CPT

## 2018-11-02 PROCEDURE — 93005 ELECTROCARDIOGRAM TRACING: CPT

## 2018-11-02 PROCEDURE — 99284 EMERGENCY DEPT VISIT MOD MDM: CPT | Mod: 25

## 2018-11-02 PROCEDURE — 81003 URINALYSIS AUTO W/O SCOPE: CPT

## 2018-11-02 PROCEDURE — 96361 HYDRATE IV INFUSION ADD-ON: CPT

## 2018-11-02 PROCEDURE — 80053 COMPREHEN METABOLIC PANEL: CPT

## 2018-11-02 PROCEDURE — 85025 COMPLETE CBC W/AUTO DIFF WBC: CPT

## 2018-11-02 PROCEDURE — 25000003 PHARM REV CODE 250: Performed by: EMERGENCY MEDICINE

## 2018-11-02 PROCEDURE — 93010 ELECTROCARDIOGRAM REPORT: CPT | Mod: ,,, | Performed by: INTERNAL MEDICINE

## 2018-11-02 PROCEDURE — 96360 HYDRATION IV INFUSION INIT: CPT

## 2018-11-02 RX ORDER — DIPHENOXYLATE HYDROCHLORIDE AND ATROPINE SULFATE 2.5; .025 MG/1; MG/1
2 TABLET ORAL
Status: COMPLETED | OUTPATIENT
Start: 2018-11-02 | End: 2018-11-02

## 2018-11-02 RX ORDER — DIPHENOXYLATE HYDROCHLORIDE AND ATROPINE SULFATE 2.5; .025 MG/1; MG/1
1-2 TABLET ORAL 4 TIMES DAILY PRN
Qty: 20 TABLET | Refills: 0 | Status: SHIPPED | OUTPATIENT
Start: 2018-11-02 | End: 2018-11-12

## 2018-11-02 RX ADMIN — SODIUM CHLORIDE 1000 ML: 0.9 INJECTION, SOLUTION INTRAVENOUS at 03:11

## 2018-11-02 RX ADMIN — SODIUM CHLORIDE 1000 ML: 0.9 INJECTION, SOLUTION INTRAVENOUS at 01:11

## 2018-11-02 RX ADMIN — DIPHENOXYLATE HYDROCHLORIDE AND ATROPINE SULFATE 2 TABLET: 2.5; .025 TABLET ORAL at 01:11

## 2018-11-02 NOTE — ED PROVIDER NOTES
Encounter Date: 11/2/2018    SCRIBE #1 NOTE: I, Belinda Serene, am scribing for, and in the presence of, Dr. Archer.       History     Chief Complaint   Patient presents with    Weakness     Time seen by provider: 1:10 PM    This is a 48 y.o. female, with a history o of IBS, asthma, HLD, and GERD, who presents with complaint of weakness that began today. The patient reports associated shaking, trouble breathing, abdominal pain, and diarrhea. The patient reports consuming only fruit and water today. The patient reports that this is similar to her IBS episodes. She reports her last episode was about a year ago. The patient reports that she felt fine yesterday. She denies nausea, vomiting, chest pain, or dysuria. She denies a history of DM, anemia, or blood transfusions. She is compliant with her medications.      The history is provided by the patient.     Review of patient's allergies indicates:   Allergen Reactions    Lipitor [atorvastatin]      Past Medical History:   Diagnosis Date    Asthma     GERD (gastroesophageal reflux disease)     Hyperlipidemia     IBS (irritable bowel syndrome)     Sleep apnea     Tension headache, chronic     Vaginal delivery     x3     Past Surgical History:   Procedure Laterality Date    BRAVO ESOPHAGEAL PH (OFF REFLUX MED) N/A 3/7/2018    Performed by Triston [facility] at [facility] ENDO (4TH FLR)    BUNIONECTOMY Bilateral     CHOLECYSTECTOMY      COLONOSCOPY N/A 8/17/2018    Procedure: COLONOSCOPY;  Surgeon: Deb Bolivar MD;  Location: [facility];  Service: Endoscopy;  Laterality: N/A;    COLONOSCOPY N/A 8/17/2018    [facility] at [facility]    ESOPHAGOGASTRODUODENOSCOPY (EGD) N/A 3/7/2018    Performed by Triston [facility] at [facility] (4TH FLR)    HEART CATH-LEFT Left 4/11/2017    Performed [facility] at [facility] CATH LAB    HYSTERECTOMY      MANOMETRY-ESOPHAGEAL-WITH IMPEDANCE N/A 3/7/2018    Performed by Triston Gore MD  at [facility] ENDO (4TH FLR)     Family History   Problem Relation Age of Onset    Heart attack Father         at 70     Lymphoma Mother     Heart disease Sister     Breast cancer Neg Hx     Colon cancer Neg Hx     Ovarian cancer Neg Hx      Social History     Tobacco Use    Smoking status: Former Smoker     Years: 15.00     Types: Cigarettes     Last attempt to quit:      Years since quittin.8    Smokeless tobacco: Never Used    Tobacco comment: quit when she was 33 yrs old.   Substance Use Topics    Alcohol use: No     Comment: rarely    Drug use: No     Review of Systems   Constitutional: Negative for chills, diaphoresis and fever.   HENT: Negative for sore throat.    Respiratory: Positive for shortness of breath.    Cardiovascular: Negative for chest pain.   Gastrointestinal: Positive for abdominal pain and diarrhea. Negative for nausea and vomiting.   Genitourinary: Negative for difficulty urinating, dysuria, frequency, hematuria and urgency.   Musculoskeletal: Negative for back pain.   Skin: Negative for pallor, rash and wound.   Neurological: Positive for weakness.   Hematological: Does not bruise/bleed easily.       Physical Exam     Initial Vitals [18 1304]   BP Pulse Resp Temp SpO2   (!) 94/46 (!) 55 (!) 24 97.8 °F (36.6 °C) 100 %      MAP       --         Physical Exam    Nursing note and vitals reviewed.  Constitutional: She appears well-developed and well-nourished.   Anxious appearing and uncomfortable.   HENT:   Head: Normocephalic and atraumatic.   Eyes: EOM are normal.   No pallor or icterus.   Neck: Normal range of motion.   Pulmonary/Chest: Breath sounds normal. No respiratory distress. She has no wheezes. She has no rhonchi. She has no rales. She exhibits no tenderness.   Abdominal:   Diffuse non-specific tenderness without rebound or guarding.   Musculoskeletal: Normal range of motion. She exhibits no edema or tenderness.   Neurological: She is alert and oriented to person,  place, and time.   Skin: Skin is warm and dry. No rash noted. No erythema. No pallor.         ED Course   Procedures  Labs Reviewed   CBC W/ AUTO DIFFERENTIAL - Abnormal; Notable for the following components:       Result Value    MCHC 31.9 (*)     All other components within normal limits   COMPREHENSIVE METABOLIC PANEL - Abnormal; Notable for the following components:    Potassium 3.3 (*)     Glucose 122 (*)     Total Bilirubin 1.4 (*)     ALT 9 (*)     All other components within normal limits   URINALYSIS - Abnormal; Notable for the following components:    Specific Gravity, UA <=1.005 (*)     All other components within normal limits   LIPASE     EKG Readings: (Independently Interpreted)   Sinus bradycardia with a heart rate of 55 bpm. No ischemia. No arrhythmia.        Imaging Results    None          Medical Decision Making:   Clinical Tests:   Lab Tests: Ordered and Reviewed  Medical Tests: Ordered and Reviewed  ED Management:  3:32 PM Patient feeling better. Reports only slightly shaky, but abdominal pain, lightheadedness, and dyspnea resolved.            Scribe Attestation:   Scribe #1: I performed the above scribed service and the documentation accurately describes the services I performed. I attest to the accuracy of the note.    Attending Attestation:           Physician Attestation for Scribe:  Physician Attestation Statement for Scribe #1: I, Dr. Archer, reviewed documentation, as scribed by Belinda Cast in my presence, and it is both accurate and complete.          Patient presents complaining of sudden-onset S of abdominal cramping, generalized weakness, shortness of breath, feeling like she is going to pass out.  Also with diarrhea.  She has had very similar symptoms with onset of IBS flare ups in the past.  On exam she initially was uncomfortable, anxious appearing.  Low blood pressure but not tachycardic.  Given IV fluids, Bentyl..  Laboratory studies, urinalysis unremarkable.  After  observation and medications patient is now feeling much better.  Blood pressure normalized.  No vomiting.  The return precautions discussed.  Encouraged follow-up with primary care and/or GI, especially symptoms persist.           Clinical Impression:     1. Dehydration    2. Weakness    3. Diarrhea, unspecified type    4. Generalized abdominal pain                                   Soham Archer II, MD  11/03/18 4300

## 2018-11-02 NOTE — ED NOTES
Pt resting in bed with eyes closed, arouses easily to verbal stimuli. Respirations even and unlabored, appears in no acute distress. Denies any needs at this time.

## 2018-11-02 NOTE — ED TRIAGE NOTES
Pt reports a sudden feeling of weakness and abd pain beginning in the last hour while on the way to a regularly scheduled doctor's appointment. Reports similar symptoms like this when her IBS flares. Denies CP, admits feeling SOB. Admits diarrhea that started today.  AAO X 3, answers questions appropriately.

## 2018-11-02 NOTE — ED NOTES
Pt resting in bed with eyes open, respirations even and unlabored, appears in no acute distress. Denies any needs at this time.

## 2018-11-12 ENCOUNTER — OFFICE VISIT (OUTPATIENT)
Dept: OBSTETRICS AND GYNECOLOGY | Facility: CLINIC | Age: 48
End: 2018-11-12
Attending: OBSTETRICS & GYNECOLOGY
Payer: MEDICARE

## 2018-11-12 VITALS
BODY MASS INDEX: 29.85 KG/M2 | DIASTOLIC BLOOD PRESSURE: 84 MMHG | HEIGHT: 71 IN | WEIGHT: 213.19 LBS | SYSTOLIC BLOOD PRESSURE: 124 MMHG

## 2018-11-12 DIAGNOSIS — Z12.31 ENCOUNTER FOR SCREENING MAMMOGRAM FOR MALIGNANT NEOPLASM OF BREAST: ICD-10-CM

## 2018-11-12 DIAGNOSIS — N64.4 PAIN OF BOTH BREASTS: Primary | ICD-10-CM

## 2018-11-12 PROCEDURE — 3008F BODY MASS INDEX DOCD: CPT | Mod: CPTII,S$GLB,, | Performed by: OBSTETRICS & GYNECOLOGY

## 2018-11-12 PROCEDURE — 99999 PR PBB SHADOW E&M-EST. PATIENT-LVL III: CPT | Mod: PBBFAC,,, | Performed by: OBSTETRICS & GYNECOLOGY

## 2018-11-12 PROCEDURE — 3074F SYST BP LT 130 MM HG: CPT | Mod: CPTII,S$GLB,, | Performed by: OBSTETRICS & GYNECOLOGY

## 2018-11-12 PROCEDURE — 3079F DIAST BP 80-89 MM HG: CPT | Mod: CPTII,S$GLB,, | Performed by: OBSTETRICS & GYNECOLOGY

## 2018-11-12 PROCEDURE — 99203 OFFICE O/P NEW LOW 30 MIN: CPT | Mod: S$GLB,,, | Performed by: OBSTETRICS & GYNECOLOGY

## 2018-11-12 RX ORDER — SUCRALFATE 1 G/1
TABLET ORAL
Refills: 3 | COMMUNITY
Start: 2018-11-07 | End: 2021-04-28 | Stop reason: CLARIF

## 2018-11-12 NOTE — PROGRESS NOTES
HISTORY OF PRESENT ILLNESS:    Sindhu Diaz is a 48 y.o. female, , No LMP recorded (lmp unknown). Patient has had a hysterectomy.,  presents for a problem visit, complaining of breast pain.  Initially on the right, then on the left and now resolved.  Had a burning sensation left nipple that also resolved.  No symptoms today.  She is due for screening mmg.    Past Medical History:   Diagnosis Date    Asthma     GERD (gastroesophageal reflux disease)     Hyperlipidemia     IBS (irritable bowel syndrome)     Sleep apnea     Tension headache, chronic     Vaginal delivery     x3       Past Surgical History:   Procedure Laterality Date    BRAVO ESOPHAGEAL PH (OFF REFLUX MED) N/A 3/7/2018    Performed by Triston Gore MD at [facility] (4TH FLR)    BUNIONECTOMY Bilateral     CHOLECYSTECTOMY      COLONOSCOPY N/A 2018    Procedure: COLONOSCOPY;  Surgeon: Deb Bolivar MD;  Location: [facility];  Service: Endoscopy;  Laterality: N/A;    COLONOSCOPY N/A 2018    Performed by [facility] at [facility]    ESOPHAGOGASTRODUODENOSCOPY (EGD) N/A 3/7/2018    Performed by Triston Gore MD at [facility] (4TH FLR)    HEART CATH-LEFT Left 2017    Per[facility] at [facility] C[facility] LAB    HYSTERECTOMY      MANOMETRY-ESOPHAGEAL-WITH IMPEDANCE N/A 3/7/2018    Performed by Triston Gore MD at [facility] (4TH FLR)       MEDICATIONS AND ALLERGIES:      Current Outpatient Medications:     diphenoxylate-atropine 2.5-0.025 mg (LOMOTIL) 2.5-0.025 mg per tablet, Take 1-2 tablets by mouth 4 (four) times daily as needed for Diarrhea., Disp: 20 tablet, Rfl: 0    estradiol (ESTRACE) 0.5 MG tablet, TK 1 T PO QD, Disp: , Rfl: 3    gabapentin (NEURONTIN) 300 MG capsule, Take 300 mg by mouth 3 (three) times daily., Disp: , Rfl: 3    hydrocodone-acetaminophen 10-325mg (NORCO)  mg Tab, , Disp: , Rfl: 0    LINZESS 290 mcg Cap, TK ONE C PO QD ON EMPTY STOMACH, Disp: , Rfl: 2     ondansetron (ZOFRAN) 4 MG tablet, Take 1 tablet (4 mg total) by mouth every 6 (six) hours as needed for Nausea., Disp: 12 tablet, Rfl: 0    oxybutynin (DITROPAN XL) 15 MG TR24, TK 1 T PO BID, Disp: , Rfl: 0    PROAIR HFA 90 mcg/actuation inhaler, Inhale 1 puff into the lungs every 4 (four) hours as needed. , Disp: , Rfl: 0    simvastatin (ZOCOR) 40 MG tablet, , Disp: , Rfl: 5    sucralfate (CARAFATE) 1 gram tablet, TK 1 T PO QID AC AND HS, Disp: , Rfl: 3    sumatriptan (IMITREX) 50 MG tablet, May repeat once after 2 hours if needed. Do not exceed 4 tabs in 1 week, Disp: 12 tablet, Rfl: 3    SYMBICORT 160-4.5 mcg/actuation HFAA, Inhale 2 puffs into the lungs once daily. , Disp: , Rfl: 0    topiramate (TOPAMAX) 25 MG tablet, Take 1 tablet (25 mg total) by mouth every evening for 7 days, THEN 2 tablets (50 mg total) every evening., Disp: 180 tablet, Rfl: 3    tramadol (ULTRAM) 50 mg tablet, , Disp: , Rfl: 1    aspirin 81 MG Chew, Take 1 tablet (81 mg total) by mouth once daily., Disp: , Rfl: 0    metoprolol succinate (TOPROL-XL) 25 MG 24 hr tablet, Take 1 tablet (25 mg total) by mouth once daily., Disp: 30 tablet, Rfl: 11    pantoprazole (PROTONIX) 40 MG tablet, Take 1 tablet (40 mg total) by mouth once daily., Disp: 30 tablet, Rfl: 3  No current facility-administered medications for this visit.     Facility-Administered Medications Ordered in Other Visits:     0.9%  NaCl infusion, , Intravenous, Continuous, Deb Bolivar MD, Last Rate: 20 mL/hr at 08/17/18 0904    sodium chloride 0.9% flush 3 mL, 3 mL, Intravenous, PRN, Deb Bolivar MD    Review of patient's allergies indicates:   Allergen Reactions    Lipitor [atorvastatin]        Family History   Problem Relation Age of Onset    Heart attack Father         at 70     Lymphoma Mother     Heart disease Sister     Breast cancer Neg Hx     Colon cancer Neg Hx     Ovarian cancer Neg Hx        Social History     Socioeconomic History     "Marital status:      Spouse name: Not on file    Number of children: Not on file    Years of education: Not on file    Highest education level: Not on file   Social Needs    Financial resource strain: Not on file    Food insecurity - worry: Not on file    Food insecurity - inability: Not on file    Transportation needs - medical: Not on file    Transportation needs - non-medical: Not on file   Occupational History    Not on file   Tobacco Use    Smoking status: Former Smoker     Years: 15.00     Types: Cigarettes     Last attempt to quit:      Years since quittin.8    Smokeless tobacco: Never Used    Tobacco comment: quit when she was 33 yrs old.   Substance and Sexual Activity    Alcohol use: No     Comment: rarely    Drug use: No    Sexual activity: Yes     Partners: Male     Birth control/protection: Surgical   Other Topics Concern    Not on file   Social History Narrative    Not on file       ROS:  GENERAL: No weight changes. No swelling. No fatigue. No fever.  CARDIOVASCULAR: No chest pain. No shortness of breath. No leg cramps.   NEUROLOGICAL: No headaches. No vision changes.  BREASTS:  No lumps. No discharge.  ABDOMEN: No pain. No nausea. No vomiting. No diarrhea. No constipation.  REPRODUCTIVE: No abnormal bleeding.   VULVA: No pain. No lesions. No itching.  VAGINA: No relaxation. No itching. No odor. No discharge. No lesions.  URINARY: No incontinence. No nocturia. No frequency. No dysuria.    /84   Ht 5' 11" (1.803 m)   Wt 96.7 kg (213 lb 3 oz)   LMP  (LMP Unknown)   BMI 29.73 kg/m²     PE:  APPEARANCE: Well nourished, well developed, in no acute distress.  ABDOMEN: Soft. No tenderness or masses. No hepatosplenomegaly. No hernias.  BREASTS:  No masses, nipple discharge, or skin changes bilaterally.  No axillary LAD.    EXTREMITIES: No edema      DIAGNOSIS & PLAN  1. Pain of both breasts         COUNSELING:  Mammogram scheduled.  "

## 2018-12-13 ENCOUNTER — OFFICE VISIT (OUTPATIENT)
Dept: OPTOMETRY | Facility: CLINIC | Age: 48
End: 2018-12-13
Payer: MEDICARE

## 2018-12-13 DIAGNOSIS — H52.7 REFRACTIVE ERROR: ICD-10-CM

## 2018-12-13 DIAGNOSIS — H10.13 ALLERGIC CONJUNCTIVITIS, BILATERAL: Primary | ICD-10-CM

## 2018-12-13 PROCEDURE — 92004 COMPRE OPH EXAM NEW PT 1/>: CPT | Mod: S$GLB,,, | Performed by: OPTOMETRIST

## 2018-12-13 PROCEDURE — 92015 DETERMINE REFRACTIVE STATE: CPT | Mod: S$GLB,,, | Performed by: OPTOMETRIST

## 2018-12-13 PROCEDURE — 99999 PR PBB SHADOW E&M-EST. PATIENT-LVL II: CPT | Mod: PBBFAC,,, | Performed by: OPTOMETRIST

## 2018-12-13 RX ORDER — OLOPATADINE HYDROCHLORIDE 1 MG/ML
1 SOLUTION/ DROPS OPHTHALMIC 2 TIMES DAILY
Qty: 5 ML | Refills: 2 | Status: SHIPPED | OUTPATIENT
Start: 2018-12-13 | End: 2019-09-05

## 2018-12-13 NOTE — PROGRESS NOTES
Subjective:       Patient ID: Sindhu Diaz is a 48 y.o. female      Chief Complaint   Patient presents with    Concerns About Ocular Health     History of Present Illness  Dls: 1-2 yrs     47 y/o female presents today with c/o blurry vision at distance and near od>os.   Pt wears pal's.     + ou  tearing  + ou itching   No burning  No pain  + ha's  No floaters  No flashes    Eye meds  None       Assessment/Plan:     1. Allergic conjunctivitis, bilateral  Patanol BID OU. AT QID OU.  - olopatadine (PATANOL) 0.1 % ophthalmic solution; Place 1 drop into both eyes 2 (two) times daily.  Dispense: 5 mL; Refill: 2    2. Refractive error  Educated patient on refractive error and discussed lens options. Dispensed updated spectacle Rx. Educated about adaptation period to new specs.    Eyeglass Final Rx     Eyeglass Final Rx       Sphere Cylinder Axis Add    Right +0.50 +0.75 110 +2.50    Left [place] +0.75 080 +2.50    Expiration Date:  12/14/2019                Follow-up in about 1 year (around 12/13/2019).

## 2019-01-22 ENCOUNTER — TELEPHONE (OUTPATIENT)
Dept: SLEEP MEDICINE | Facility: CLINIC | Age: 49
End: 2019-01-22

## 2019-01-22 NOTE — TELEPHONE ENCOUNTER
Patient has new insurance now. Will like to know if she can get an order for a cpap machine. Please advise.

## 2019-01-23 ENCOUNTER — TELEPHONE (OUTPATIENT)
Dept: SLEEP MEDICINE | Facility: CLINIC | Age: 49
End: 2019-01-23

## 2019-01-23 NOTE — TELEPHONE ENCOUNTER
----- Message from Bhargavi Butterfield MA sent at 1/22/2019  5:04 PM CST -----  Patient has new insurance now. Will like to know if she can get an order for a cpap machine. Please advise.

## 2019-01-23 NOTE — TELEPHONE ENCOUNTER
Tried to contact pt to let her know that her sleep study did not meet criteria for AMARA and will likely not meet criteria for CPAP treatment.   As well as ask  if she would like repeat sleep testing. Number on file is disconnected.

## 2019-01-23 NOTE — TELEPHONE ENCOUNTER
Patient's 09/02/2017 sleep study did not meet criteria for AMARA and will likely not meet criteria for CPAP treatment.   There is option to repeat sleep testing if she continues to have snoring and daytime sleepiness to see if she now has AMARA and qualify for CPAP.   Let me know if she would like repeat sleep testing.

## 2019-01-25 ENCOUNTER — OFFICE VISIT (OUTPATIENT)
Dept: NEUROLOGY | Facility: CLINIC | Age: 49
End: 2019-01-25
Payer: MEDICARE

## 2019-01-25 VITALS
SYSTOLIC BLOOD PRESSURE: 133 MMHG | HEART RATE: 75 BPM | WEIGHT: 213.19 LBS | HEIGHT: 71 IN | DIASTOLIC BLOOD PRESSURE: 75 MMHG | BODY MASS INDEX: 29.85 KG/M2

## 2019-01-25 DIAGNOSIS — G57.12 MERALGIA PARESTHETICA OF LEFT SIDE: ICD-10-CM

## 2019-01-25 DIAGNOSIS — G43.019 INTRACTABLE MIGRAINE WITHOUT AURA AND WITHOUT STATUS MIGRAINOSUS: ICD-10-CM

## 2019-01-25 DIAGNOSIS — G47.33 OSA (OBSTRUCTIVE SLEEP APNEA): ICD-10-CM

## 2019-01-25 PROCEDURE — 99214 OFFICE O/P EST MOD 30 MIN: CPT | Mod: S$GLB,,, | Performed by: PSYCHIATRY & NEUROLOGY

## 2019-01-25 PROCEDURE — 99214 PR OFFICE/OUTPT VISIT, EST, LEVL IV, 30-39 MIN: ICD-10-PCS | Mod: S$GLB,,, | Performed by: PSYCHIATRY & NEUROLOGY

## 2019-01-25 PROCEDURE — 99999 PR PBB SHADOW E&M-EST. PATIENT-LVL IV: CPT | Mod: PBBFAC,,, | Performed by: PSYCHIATRY & NEUROLOGY

## 2019-01-25 PROCEDURE — 3075F PR MOST RECENT SYSTOLIC BLOOD PRESS GE 130-139MM HG: ICD-10-PCS | Mod: CPTII,S$GLB,, | Performed by: PSYCHIATRY & NEUROLOGY

## 2019-01-25 PROCEDURE — 3075F SYST BP GE 130 - 139MM HG: CPT | Mod: CPTII,S$GLB,, | Performed by: PSYCHIATRY & NEUROLOGY

## 2019-01-25 PROCEDURE — 3078F DIAST BP <80 MM HG: CPT | Mod: CPTII,S$GLB,, | Performed by: PSYCHIATRY & NEUROLOGY

## 2019-01-25 PROCEDURE — 3008F PR BODY MASS INDEX (BMI) DOCUMENTED: ICD-10-PCS | Mod: CPTII,S$GLB,, | Performed by: PSYCHIATRY & NEUROLOGY

## 2019-01-25 PROCEDURE — 3008F BODY MASS INDEX DOCD: CPT | Mod: CPTII,S$GLB,, | Performed by: PSYCHIATRY & NEUROLOGY

## 2019-01-25 PROCEDURE — 99999 PR PBB SHADOW E&M-EST. PATIENT-LVL IV: ICD-10-PCS | Mod: PBBFAC,,, | Performed by: PSYCHIATRY & NEUROLOGY

## 2019-01-25 PROCEDURE — 3078F PR MOST RECENT DIASTOLIC BLOOD PRESSURE < 80 MM HG: ICD-10-PCS | Mod: CPTII,S$GLB,, | Performed by: PSYCHIATRY & NEUROLOGY

## 2019-01-25 NOTE — PROGRESS NOTES
[facility] NEUROLOGY  [facility] [place]    Date: January 25, 2019   Patient Name: Sindhu Diaz   MRN: [id number]   PCP: ADRIANA Carson  Referring Provider: No ref. provider found    Assessment:   Sindhu Diaz is a 48 y.o. female presenting in follow-up for multiple issues.  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  Have asked patient to have her  video the events.  Given rapid return to baseline and brief nature events doubt seizure.  Will increase gabapentin to 600 mg t.i.d. p.r.n..  Patient wishes to attempt this prior to increase of Topamax.  Patient is also going to follow up with sleep medicine to both reestablish care and obtain CPAP.  Plan:     Problem List Items Addressed This Visit        Neuro    Intractable migraine without aura    Current Assessment & Plan     -- continue current topamax, will consider future dose increases         Meralgia paraesthetica    Current Assessment & Plan     -- gabapentin 600 mg TID            Other    AMARA (obstructive sleep apnea)    Current Assessment & Plan     Patient to reestablish with sleep med/obtain CPAP             Regino Echeverria MD  [facility]   Department of Neurology    Patient note was created using Dragon Dictation.  Any errors in syntax or even information may not have been identified and edited on initial review prior to signing this note.  Subjective:        HPI:   Ms. Sindhu Diaz is a 48 y.o. female presenting in follow-up of multiple concerns today.  The patient reports that since her last visit her  has noted her briefly jerking during her sleep.  She is unaware of the movements but states that he tells her that her whole body jerks, sometimes in rapid succession.  He is able to wake her immediately from the episodes.  She denies associated tongue biting or incontinence.  She states she continues to experience near daily tension-type headaches upon awakening that have only  partially responded to Topamax.  She does state that she was told that her last sleep evaluations that she does have sleep apnea but at the time she had Medicaid which would not cover CPAP.  Now that she has insurance she would like to revisit this as we both suspect that untreated AMARA may be contributing to her daily headaches.  She also reports continuing neuropathic pain stating is only partially responsive to gabapentin.  She is interested in a dose increase of gabapentin.  She had previously tried physical therapy for management of both lumbago and meralgia paresthetica with only minimal improvement.    PAST MEDICAL HISTORY:  Past Medical History:   Diagnosis Date    Asthma     GERD (gastroesophageal reflux disease)     Hyperlipidemia     IBS (irritable bowel syndrome)     Sleep apnea     Tension headache, chronic     Vaginal delivery     x3     PAST SURGICAL HISTORY:  Past Surgical History:   Procedure Laterality Date    BRAVO ESOPHAGEAL PH (OFF REFLUX MED) N/A 3/7/2018    Performed by Triston Gore MD at [facility] (4TH FLR)    BUNIONECTOMY Bilateral     CHOLECYSTECTOMY      COLONOSCOPY N/A 8/17/2018    [facility]    ESOPHAGOGASTRODUODENOSCOPY (EGD) N/A 3/7/2018    Performed by Triston Gore MD at [facility] (4TH FLR)    HEART CATH-LEFT Left 4/11/2017    [facility] CATH LAB    HYSTERECTOMY      MANOMETRY-ESOPHAGEAL-WITH IMPEDANCE N/A 3/7/2018    Performed by Triston Gore MD at [facility] END[facility] (4TH FLR)     CURRENT MEDS:  Current Outpatient Medications   Medication Sig Dispense Refill    estradiol (ESTRACE) 0.5 MG tablet TK 1 T PO QD  3    gabapentin (NEURONTIN) 300 MG capsule Take 300 mg by mouth 3 (three) times daily.  3    hydrocodone-acetaminophen 10-325mg (NORCO)  mg Tab   0    LINZESS 290 mcg Cap TK ONE C PO QD ON EMPTY STOMACH  2    olopatadine (PATANOL) 0.1 % ophthalmic solution Place 1 drop into both  eyes 2 (two) times daily. 5 mL 2    ondansetron (ZOFRAN) 4 MG tablet Take 1 tablet (4 mg total) by mouth every 6 (six) hours as needed for Nausea. 12 tablet 0    oxybutynin (DITROPAN XL) 15 MG TR24 TK 1 T PO BID  0    PROAIR HFA 90 mcg/actuation inhaler Inhale 1 puff into the lungs every 4 (four) hours as needed.   0    simvastatin (ZOCOR) 40 MG tablet   5    sucralfate (CARAFATE) 1 gram tablet TK 1 T PO QID AC AND HS  3    sumatriptan (IMITREX) 50 MG tablet May repeat once after 2 hours if needed. Do not exceed 4 tabs in 1 week 12 tablet 3    SYMBICORT 160-4.5 mcg/actuation HFAA Inhale 2 puffs into the lungs once daily.   0    topiramate (TOPAMAX) 25 MG tablet Take 1 tablet (25 mg total) by mouth every evening for 7 days, THEN 2 tablets (50 mg total) every evening. 180 tablet 3    tramadol (ULTRAM) 50 mg tablet   1    aspirin 81 MG Chew Take 1 tablet (81 mg total) by mouth once daily.  0    metoprolol succinate (TOPROL-XL) 25 MG 24 hr tablet Take 1 tablet (25 mg total) by mouth once daily. 30 tablet 11    pantoprazole (PROTONIX) 40 MG tablet Take 1 tablet (40 mg total) by mouth once daily. 30 tablet 3     No current facility-administered medications for this visit.      Facility-Administered Medications Ordered in Other Visits   Medication Dose Route Frequency Provider Last Rate Last Dose    0.9%  NaCl infusion   Intravenous Continuous Deb Bolivar MD 20 mL/hr at 08/17/18 0904      sodium chloride 0.9% flush 3 mL  3 mL Intravenous PRN Deb Bolivar MD         ALLERGIES:  Review of patient's allergies indicates:  No Known Allergies    FAMILY HISTORY:  Family History   Problem Relation Age of Onset    Heart attack Father         at 70     Lymphoma Mother     Cataracts Mother     Heart disease Sister     No Known Problems Brother     No Known Problems Maternal Aunt     No Known Problems Maternal Uncle     No Known Problems Paternal Aunt     No Known Problems Paternal Uncle     No  "Known Problems Maternal Grandmother     No Known Problems Maternal Grandfather     No Known Problems Paternal Grandmother     No Known Problems Paternal Grandfather     Breast cancer Neg Hx     Colon cancer Neg Hx     Ovarian cancer Neg Hx     Amblyopia Neg Hx     Blindness Neg Hx     Cancer Neg Hx     Diabetes Neg Hx     Glaucoma Neg Hx     Hypertension Neg Hx     Macular degeneration Neg Hx     Retinal detachment Neg Hx     Strabismus Neg Hx     Stroke Neg Hx     Thyroid disease Neg Hx        SOCIAL HISTORY:  Social History     Tobacco Use    Smoking status: Former Smoker     Years: 15.00     Types: Cigarettes     Last attempt to quit:      Years since quittin.0    Smokeless tobacco: Never Used    Tobacco comment: quit when she was 33 yrs old.   Substance Use Topics    Alcohol use: No     Comment: rarely    Drug use: No       Review of Systems:  12 review of systems is negative except for the symptoms mentioned in HPI.      Objective:     Vitals:    19 1007   BP: 133/75   Pulse: 75   Weight: 96.7 kg (213 lb 3 oz)   Height: 5' 11" (1.803 m)     General: NAD, well nourished   Eyes: no tearing, discharge, no erythema   ENT: moist mucous membranes of the oral cavity, nares patent    Neck: Supple, full range of motion  Cardiovascular: Warm and well perfused, pulses equal and symmetrical  Lungs: Normal work of breathing, normal chest wall excursions  Skin: No rash, lesions, or breakdown on exposed skin  Psychiatry: Mood and affect are appropriate   Abdomen: soft, non tender, non distended  Extremeties: No cyanosis, clubbing or edema.    Neurological   MENTAL STATUS: Alert and oriented to person, place, and time. Attention and concentration within normal limits. Speech without dysarthria, able to name and repeat without difficulty.  CRANIAL NERVES: No papilledema. Visual fields intact. PERRL. EOMI. Facial sensation intact. Face symmetrical. Hearing grossly intact. Full shoulder shrug " bilaterally. Tongue protrudes midline   SENSORY: Sensation is intact to light touch throughout.   MOTOR: Normal bulk and tone. 5/5 deltoid, biceps, triceps, interosseous, hand  bilaterally. 5/5 iliopsoas, knee extension/flexion, foot dorsi/plantarflexion bilaterally.    REFLEXES: Symmetric and 2+ throughout.  CEREBELLAR/COORDINATION/GAIT: Gait steady with normal arm swing and stride length.  F to N intact.

## 2019-09-05 ENCOUNTER — OFFICE VISIT (OUTPATIENT)
Dept: CARDIOLOGY | Facility: CLINIC | Age: 49
End: 2019-09-05
Payer: MEDICARE

## 2019-09-05 VITALS
BODY MASS INDEX: 32.41 KG/M2 | DIASTOLIC BLOOD PRESSURE: 70 MMHG | HEART RATE: 67 BPM | HEIGHT: 71 IN | WEIGHT: 231.5 LBS | OXYGEN SATURATION: 100 % | SYSTOLIC BLOOD PRESSURE: 126 MMHG

## 2019-09-05 DIAGNOSIS — E78.2 MIXED HYPERLIPIDEMIA: Primary | ICD-10-CM

## 2019-09-05 DIAGNOSIS — I10 HYPERTENSION: ICD-10-CM

## 2019-09-05 PROCEDURE — 3074F SYST BP LT 130 MM HG: CPT | Mod: CPTII,S$GLB,, | Performed by: INTERNAL MEDICINE

## 2019-09-05 PROCEDURE — 3008F PR BODY MASS INDEX (BMI) DOCUMENTED: ICD-10-PCS | Mod: CPTII,S$GLB,, | Performed by: INTERNAL MEDICINE

## 2019-09-05 PROCEDURE — 99214 OFFICE O/P EST MOD 30 MIN: CPT | Mod: S$GLB,,, | Performed by: INTERNAL MEDICINE

## 2019-09-05 PROCEDURE — 3074F PR MOST RECENT SYSTOLIC BLOOD PRESSURE < 130 MM HG: ICD-10-PCS | Mod: CPTII,S$GLB,, | Performed by: INTERNAL MEDICINE

## 2019-09-05 PROCEDURE — 99999 PR PBB SHADOW E&M-EST. PATIENT-LVL IV: ICD-10-PCS | Mod: PBBFAC,,, | Performed by: INTERNAL MEDICINE

## 2019-09-05 PROCEDURE — 93000 EKG 12-LEAD: ICD-10-PCS | Mod: S$GLB,,, | Performed by: INTERNAL MEDICINE

## 2019-09-05 PROCEDURE — 99214 PR OFFICE/OUTPT VISIT, EST, LEVL IV, 30-39 MIN: ICD-10-PCS | Mod: S$GLB,,, | Performed by: INTERNAL MEDICINE

## 2019-09-05 PROCEDURE — 93000 ELECTROCARDIOGRAM COMPLETE: CPT | Mod: S$GLB,,, | Performed by: INTERNAL MEDICINE

## 2019-09-05 PROCEDURE — 99999 PR PBB SHADOW E&M-EST. PATIENT-LVL IV: CPT | Mod: PBBFAC,,, | Performed by: INTERNAL MEDICINE

## 2019-09-05 PROCEDURE — 3008F BODY MASS INDEX DOCD: CPT | Mod: CPTII,S$GLB,, | Performed by: INTERNAL MEDICINE

## 2019-09-05 PROCEDURE — 3078F DIAST BP <80 MM HG: CPT | Mod: CPTII,S$GLB,, | Performed by: INTERNAL MEDICINE

## 2019-09-05 PROCEDURE — 3078F PR MOST RECENT DIASTOLIC BLOOD PRESSURE < 80 MM HG: ICD-10-PCS | Mod: CPTII,S$GLB,, | Performed by: INTERNAL MEDICINE

## 2019-09-05 NOTE — PROGRESS NOTES
Subjective:    Patient ID:  Sindhu Diaz is a 49 y.o. female who presents for follow-up of Valvular Heart Disease      HPI     CP, asthma, palpitations     4/11/17 LHC - EDP 9, EF 60%, normal coronaries     Echo 1/17/18    1 - Normal left ventricular systolic function (EF 55-60%).     2 - Normal right ventricular systolic function .     3 - Normal left ventricular diastolic function.     4 - No wall motion abnormalities.     5.  Normal valvular function     Stress test 4/3/17  LVEF: 67 %  Impression: ABNORMAL MYOCARDIAL PERFUSION  1. There is evidence for moderate myocardial ischemia in the anterior wall of the left ventricle.   2. The perfusion scan is free of evidence for myocardial injury.   3. Resting wall motion is physiologic.   4. Resting LV function is normal.      Holter 1/17/18 - average HR 76, Few PVCs and PACS, no SVT or VT     8/22/18 Still gets occasional palpitations  Denies CP or SOB  EKG NSR - ok    Denies CP or SOB  EKG NSR - ok  Having some pains in her left lower back with numbness down left leg    Review of Systems   Constitution: Negative for decreased appetite.   HENT: Negative for ear discharge.    Eyes: Negative for blurred vision.   Respiratory: Negative for hemoptysis.    Endocrine: Negative for polyphagia.   Hematologic/Lymphatic: Negative for adenopathy.   Skin: Negative for color change.   Musculoskeletal: Negative for joint swelling.   Genitourinary: Negative for bladder incontinence.   Neurological: Negative for brief paralysis.   Psychiatric/Behavioral: Negative for hallucinations.   Allergic/Immunologic: Negative for hives.        Objective:    Physical Exam   Constitutional: She is oriented to person, place, and time. She appears well-developed and well-nourished.   HENT:   Head: Normocephalic and atraumatic.   Eyes: Pupils are equal, round, and reactive to light. Conjunctivae are normal.   Neck: Normal range of motion. Neck supple.   Cardiovascular: Normal rate, normal heart  sounds and intact distal pulses.   Pulmonary/Chest: Effort normal and breath sounds normal.   Abdominal: Soft. Bowel sounds are normal.   Musculoskeletal: Normal range of motion.   Neurological: She is alert and oriented to person, place, and time.   Skin: Skin is warm and dry.         Assessment:       1. Nonrheumatic aortic valve stenosis    2. Mixed hyperlipidemia         Plan:       Echo from 2016 showed mild AS - she has no cardiac murmur and no AS was seen on echo 1/17/18 - doubt she actually has AS  Cardiac stable  OV 1 year

## 2019-12-04 ENCOUNTER — OFFICE VISIT (OUTPATIENT)
Dept: OBSTETRICS AND GYNECOLOGY | Facility: CLINIC | Age: 49
End: 2019-12-04
Attending: OBSTETRICS & GYNECOLOGY
Payer: MEDICARE

## 2019-12-04 VITALS — WEIGHT: 224.88 LBS | BODY MASS INDEX: 31.48 KG/M2 | HEIGHT: 71 IN

## 2019-12-04 DIAGNOSIS — R10.32 LEFT LOWER QUADRANT PAIN: Primary | ICD-10-CM

## 2019-12-04 PROCEDURE — 99213 PR OFFICE/OUTPT VISIT, EST, LEVL III, 20-29 MIN: ICD-10-PCS | Mod: S$GLB,,, | Performed by: OBSTETRICS & GYNECOLOGY

## 2019-12-04 PROCEDURE — 99999 PR PBB SHADOW E&M-EST. PATIENT-LVL III: ICD-10-PCS | Mod: PBBFAC,,, | Performed by: OBSTETRICS & GYNECOLOGY

## 2019-12-04 PROCEDURE — 99213 OFFICE O/P EST LOW 20 MIN: CPT | Mod: S$GLB,,, | Performed by: OBSTETRICS & GYNECOLOGY

## 2019-12-04 PROCEDURE — 3008F BODY MASS INDEX DOCD: CPT | Mod: CPTII,S$GLB,, | Performed by: OBSTETRICS & GYNECOLOGY

## 2019-12-04 PROCEDURE — 99999 PR PBB SHADOW E&M-EST. PATIENT-LVL III: CPT | Mod: PBBFAC,,, | Performed by: OBSTETRICS & GYNECOLOGY

## 2019-12-04 PROCEDURE — 3008F PR BODY MASS INDEX (BMI) DOCUMENTED: ICD-10-PCS | Mod: CPTII,S$GLB,, | Performed by: OBSTETRICS & GYNECOLOGY

## 2019-12-04 NOTE — PROGRESS NOTES
HISTORY OF PRESENT ILLNESS:    Sindhu Diaz is a 49 y.o. female, , No LMP recorded (lmp unknown). Patient has had a hysterectomy.,  presents for a problem visit, complaining of left lower quadrant pelvic pain x 3 weeks.  Worse with intercourse and walking.  Better with lying down.  No change in GI habits or urinary symptoms.   Some tingling in bilateral toes which is worse with walking.  Long history of back pain related to her work as a .    Past Medical History:   Diagnosis Date    Asthma     GERD (gastroesophageal reflux disease)     Hyperlipidemia     IBS (irritable bowel syndrome)     Sleep apnea     Tension headache, chronic     Vaginal delivery     x3       Past Surgical History:   Procedure Laterality Date    BUNIONECTOMY Bilateral     CHOLECYSTECTOMY      COLONOSCOPY N/A 2018    Procedure: COLONOSCOPY;  Surgeon: Deb Bolivar MD;  Location: [facility];  Service: Endoscopy;  Laterality: N/A;    HYSTERECTOMY         MEDICATIONS AND ALLERGIES:      Current Outpatient Medications:     gabapentin (NEURONTIN) 300 MG capsule, Take 300 mg by mouth 3 (three) times daily., Disp: , Rfl: 3    hydrocodone-acetaminophen 10-325mg (NORCO)  mg Tab, , Disp: , Rfl: 0    LINZESS 290 mcg Cap, TK ONE C PO QD ON EMPTY STOMACH, Disp: , Rfl: 2    ondansetron (ZOFRAN) 4 MG tablet, Take 1 tablet (4 mg total) by mouth every 6 (six) hours as needed for Nausea., Disp: 12 tablet, Rfl: 0    oxybutynin (DITROPAN XL) 15 MG TR24, TK 1 T PO BID, Disp: , Rfl: 0    PROAIR HFA 90 mcg/actuation inhaler, Inhale 1 puff into the lungs every 4 (four) hours as needed. , Disp: , Rfl: 0    simvastatin (ZOCOR) 40 MG tablet, , Disp: , Rfl: 5    sucralfate (CARAFATE) 1 gram tablet, TK 1 T PO QID AC AND HS, Disp: , Rfl: 3    SYMBICORT 160-4.5 mcg/actuation HFAA, Inhale 2 puffs into the lungs once daily. , Disp: , Rfl: 0    tramadol (ULTRAM) 50 mg tablet, , Disp: , Rfl: 1    aspirin 81 MG Chew, Take  1 tablet (81 mg total) by mouth once daily., Disp: , Rfl: 0    metoprolol succinate (TOPROL-XL) 25 MG 24 hr tablet, Take 1 tablet (25 mg total) by mouth once daily., Disp: 30 tablet, Rfl: 11    topiramate (TOPAMAX) 25 MG tablet, Take 1 tablet (25 mg total) by mouth every evening for 7 days, THEN 2 tablets (50 mg total) every evening., Disp: 180 tablet, Rfl: 3  No current facility-administered medications for this visit.     Facility-Administered Medications Ordered in Other Visits:     0.9%  NaCl infusion, , Intravenous, Continuous, Deb Bolivar MD, Last Rate: 20 mL/hr at 08/17/18 0904    sodium chloride 0.9% flush 3 mL, 3 mL, Intravenous, PRN, Deb Bolivar MD    Review of patient's allergies indicates:   Allergen Reactions    Lipitor [atorvastatin]     Statins-hmg-coa reductase inhibitors        Family History   Problem Relation Age of Onset    Heart attack Father         at 70     Lymphoma Mother     Cataracts Mother     Heart disease Sister     No Known Problems Brother     No Known Problems Maternal Aunt     No Known Problems Maternal Uncle     No Known Problems Paternal Aunt     No Known Problems Paternal Uncle     No Known Problems Maternal Grandmother     No Known Problems Maternal Grandfather     No Known Problems Paternal Grandmother     No Known Problems Paternal Grandfather     Breast cancer Neg Hx     Colon cancer Neg Hx     Ovarian cancer Neg Hx     Amblyopia Neg Hx     Blindness Neg Hx     Cancer Neg Hx     Diabetes Neg Hx     Glaucoma Neg Hx     Hypertension Neg Hx     Macular degeneration Neg Hx     Retinal detachment Neg Hx     Strabismus Neg Hx     Stroke Neg Hx     Thyroid disease Neg Hx        Social History     Socioeconomic History    Marital status:      Spouse name: Not on file    Number of children: Not on file    Years of education: Not on file    Highest education level: Not on file   Occupational History    Not on file   Social  "Needs    Financial resource strain: Not on file    Food insecurity:     Worry: Not on file     Inability: Not on file    Transportation needs:     Medical: Not on file     Non-medical: Not on file   Tobacco Use    Smoking status: Former Smoker     Years: 15.00     Types: Cigarettes     Last attempt to quit:      Years since quittin.9    Smokeless tobacco: Never Used    Tobacco comment: quit when she was 33 yrs old.   Substance and Sexual Activity    Alcohol use: No     Comment: rarely    Drug use: No    Sexual activity: Yes     Partners: Male     Birth control/protection: Surgical   Lifestyle    Physical activity:     Days per week: Not on file     Minutes per session: Not on file    Stress: Not on file   Relationships    Social connections:     Talks on phone: Not on file     Gets together: Not on file     Attends Je[employer] witness service: Not on file     Active member of club or organization: Not on file     Attends meetings of clubs or organizations: Not on file     Relationship status: Not on file   Other Topics Concern    Not on file   Social History Narrative    Not on file       ROS:  GENERAL: No weight changes. No swelling. No fatigue. No fever.  CARDIOVASCULAR: No chest pain. No shortness of breath. No leg cramps.   NEUROLOGICAL: No headaches. No vision changes.  BREASTS: No pain. No lumps. No discharge.  ABDOMEN:  No nausea. No vomiting. No diarrhea. No constipation.  REPRODUCTIVE: No abnormal bleeding.   VULVA: No pain. No lesions. No itching.  VAGINA: No relaxation. No itching. No odor. No discharge. No lesions.  URINARY: No incontinence. No nocturia. No frequency. No dysuria.    Ht 5' 11" (1.803 m)   Wt 102 kg (224 lb 13.9 oz)   LMP  (LMP Unknown)   BMI 31.36 kg/m²     PE:  APPEARANCE: Well nourished, well developed, in no acute distress.  ABDOMEN: Soft. No tenderness or masses. No hepatosplenomegaly. No hernias.  BREASTS, FUNDOSCOPIC, RECTAL DEFERRED  PELVIC: External female " genitalia without lesions.  Female hair distribution. Adequate perineal body, Normal urethral meatus. Vagina moist and well rugated without lesions or discharge.  No significant cystocele or rectocele present. Cervix and uterus absent.  Tender left lower quadrant with deep palpation - states she feels it in her back.  EXTREMITIES: No edema      DIAGNOSIS & PLAN  1. Left lower quadrant pain  US Pelvis Comp with Transvag NON-OB (xpd       COUNSELING:  Follow-up neurology if u/s normal

## 2019-12-09 ENCOUNTER — PATIENT MESSAGE (OUTPATIENT)
Dept: OBSTETRICS AND GYNECOLOGY | Facility: CLINIC | Age: 49
End: 2019-12-09

## 2019-12-13 ENCOUNTER — OFFICE VISIT (OUTPATIENT)
Dept: ORTHOPEDICS | Facility: CLINIC | Age: 49
End: 2019-12-13
Payer: MEDICARE

## 2019-12-13 VITALS — HEIGHT: 71 IN | BODY MASS INDEX: 31.36 KG/M2 | WEIGHT: 224 LBS

## 2019-12-13 DIAGNOSIS — M22.2X9 PATELLOFEMORAL STRESS SYNDROME, UNSPECIFIED LATERALITY: Primary | ICD-10-CM

## 2019-12-13 PROCEDURE — 99999 PR PBB SHADOW E&M-EST. PATIENT-LVL III: CPT | Mod: PBBFAC,,, | Performed by: ORTHOPAEDIC SURGERY

## 2019-12-13 PROCEDURE — 3008F PR BODY MASS INDEX (BMI) DOCUMENTED: ICD-10-PCS | Mod: CPTII,S$GLB,, | Performed by: ORTHOPAEDIC SURGERY

## 2019-12-13 PROCEDURE — 99999 PR PBB SHADOW E&M-EST. PATIENT-LVL III: ICD-10-PCS | Mod: PBBFAC,,, | Performed by: ORTHOPAEDIC SURGERY

## 2019-12-13 PROCEDURE — 3008F BODY MASS INDEX DOCD: CPT | Mod: CPTII,S$GLB,, | Performed by: ORTHOPAEDIC SURGERY

## 2019-12-13 PROCEDURE — 99213 PR OFFICE/OUTPT VISIT, EST, LEVL III, 20-29 MIN: ICD-10-PCS | Mod: S$GLB,,, | Performed by: ORTHOPAEDIC SURGERY

## 2019-12-13 PROCEDURE — 99213 OFFICE O/P EST LOW 20 MIN: CPT | Mod: S$GLB,,, | Performed by: ORTHOPAEDIC SURGERY

## 2019-12-13 NOTE — PROGRESS NOTES
Subjective:      Patient ID: Sindhu Diaz is a 49 y.o. female.    Chief Complaint: Pain of the Left Knee and Pain of the Right Knee    HPI    The patient complains of many months of anterior knee pain bilaterally. She had symptoms of this sort ever since participating high school sports.  Denies specific injury to her knees.  She denies mechanical symptoms. Flexion loading aggravates her symptoms. She denies specific treatment but takes narcotics from other providers according to her report.          Review of Systems   Constitution: Negative for fever and weight loss.   HENT: Negative for congestion.    Eyes: Negative for visual disturbance.   Cardiovascular: Negative for chest pain.   Respiratory: Negative for shortness of breath.    Hematologic/Lymphatic: Negative for bleeding problem. Does not bruise/bleed easily.   Skin: Negative for poor wound healing.   Musculoskeletal: Positive for joint pain.   Gastrointestinal: Negative for abdominal pain.   Genitourinary: Negative for dysuria.   Neurological: Negative for seizures.   Psychiatric/Behavioral: Negative for altered mental status.   Allergic/Immunologic: Negative for persistent infections.         Objective:      Ortho/SPM Exam    Right knee    The patient is not in acute distress.   Body habitus is normal.   Sclera appear normal  No respiratory distress  The patient walks without a limp.  Resisted SLR negative.   The skin over the knee is intact.  Knee effusion 0  Tendernes is located absent.  Range of motion- Flexion full, Extension full.   Ligament exam:   MCL intact   Lachman intact              Post sag intact    LCL intact  Patellar apprehension negative.  Popliteal cyst negative  Patellar crepitation absent.  Flexion/pinch negative.  Pulses DP present, PT present.  Motor normal 5/5 strength in all tested muscle groups.   Sensory normal.      Left knee is identical              Assessment:       Encounter Diagnosis   Name Primary?    Patellofemoral  stress syndrome, unspecified laterality Yes                  Plan:       Sindhu was seen today for pain and pain.    Diagnoses and all orders for this visit:    Patellofemoral stress syndrome, unspecified laterality        I explained my diagnostic impression and the reasoning behind it in detail, using layman's terms.  Models and/or pictures were used to help in the explanation.    I explained the natural history of this condition.    Physical therapy prescribed    Avoidance of flexion loading recommended.  Modification exercise explained.

## 2019-12-16 ENCOUNTER — OFFICE VISIT (OUTPATIENT)
Dept: ORTHOPEDICS | Facility: CLINIC | Age: 49
End: 2019-12-16
Payer: MEDICARE

## 2019-12-16 VITALS — HEIGHT: 71 IN | BODY MASS INDEX: 31.36 KG/M2 | WEIGHT: 224 LBS

## 2019-12-16 DIAGNOSIS — G56.03 BILATERAL CARPAL TUNNEL SYNDROME: Primary | ICD-10-CM

## 2019-12-16 PROCEDURE — 20526 THER INJECTION CARP TUNNEL: CPT | Mod: 50,S$GLB,, | Performed by: ORTHOPAEDIC SURGERY

## 2019-12-16 PROCEDURE — 3008F BODY MASS INDEX DOCD: CPT | Mod: CPTII,S$GLB,, | Performed by: ORTHOPAEDIC SURGERY

## 2019-12-16 PROCEDURE — 99999 PR PBB SHADOW E&M-EST. PATIENT-LVL III: CPT | Mod: PBBFAC,,, | Performed by: ORTHOPAEDIC SURGERY

## 2019-12-16 PROCEDURE — 99214 PR OFFICE/OUTPT VISIT, EST, LEVL IV, 30-39 MIN: ICD-10-PCS | Mod: 25,S$GLB,, | Performed by: ORTHOPAEDIC SURGERY

## 2019-12-16 PROCEDURE — 3008F PR BODY MASS INDEX (BMI) DOCUMENTED: ICD-10-PCS | Mod: CPTII,S$GLB,, | Performed by: ORTHOPAEDIC SURGERY

## 2019-12-16 PROCEDURE — 99214 OFFICE O/P EST MOD 30 MIN: CPT | Mod: 25,S$GLB,, | Performed by: ORTHOPAEDIC SURGERY

## 2019-12-16 PROCEDURE — 99999 PR PBB SHADOW E&M-EST. PATIENT-LVL III: ICD-10-PCS | Mod: PBBFAC,,, | Performed by: ORTHOPAEDIC SURGERY

## 2019-12-16 PROCEDURE — 20526 PR INJECT CARPAL TUNNEL: ICD-10-PCS | Mod: 50,S$GLB,, | Performed by: ORTHOPAEDIC SURGERY

## 2019-12-16 RX ORDER — TRIAMCINOLONE ACETONIDE 40 MG/ML
40 INJECTION, SUSPENSION INTRA-ARTICULAR; INTRAMUSCULAR
Status: COMPLETED | OUTPATIENT
Start: 2019-12-16 | End: 2019-12-16

## 2019-12-16 RX ADMIN — TRIAMCINOLONE ACETONIDE 40 MG: 40 INJECTION, SUSPENSION INTRA-ARTICULAR; INTRAMUSCULAR at 11:12

## 2019-12-16 NOTE — PROCEDURES
Procedures   PROCEDURE:  I have explained the risks, benefits, and alternatives of the procedure in detail.  The patient voices understanding and all questions have been answered.  The patient agrees to proceed as planned. So after I performed a sterile prep of the skin in the normal fashion the bilateral carpal tunnel is injected from the volar approach using a 25 gauge needle with a combination of 0.5 cc 1% plain xylocaine and 20 mg of Kenolog. The patient is cautioned and immediate relief of pain is secondary to the local anesthetic and will be temporary.  After the anesthetic wears off there may be a increase in pain that may last for a few hours or a few days and they should use ice to help alleviate this flair up of pain. Patient tolerated the procedure well.

## 2019-12-16 NOTE — PROGRESS NOTES
Subjective:      Patient ID: Sindhu Diaz is a 49 y.o. female.    Chief Complaint: Pain of the Left Hand and Pain of the Right Hand      HPI: Sindhu Diaz  Is here for initial visit with complaints of bilateral hand pain R>>L. She describes achy palm pain with associated burning of the index finger for approximately 6 months duration. Pain is worse with some activities like shooting at the gun range. She denies decreased  strength or dropping items. She has not tried anything for treatment.     Past Medical History:   Diagnosis Date    Asthma     GERD (gastroesophageal reflux disease)     Hyperlipidemia     IBS (irritable bowel syndrome)     Sleep apnea     Tension headache, chronic     Vaginal delivery     x3       Current Outpatient Medications:     gabapentin (NEURONTIN) 300 MG capsule, Take 300 mg by mouth 3 (three) times daily., Disp: , Rfl: 3    hydrocodone-acetaminophen 10-325mg (NORCO)  mg Tab, , Disp: , Rfl: 0    LINZESS 290 mcg Cap, TK ONE C PO QD ON EMPTY STOMACH, Disp: , Rfl: 2    ondansetron (ZOFRAN) 4 MG tablet, Take 1 tablet (4 mg total) by mouth every 6 (six) hours as needed for Nausea., Disp: 12 tablet, Rfl: 0    oxybutynin (DITROPAN XL) 15 MG TR24, TK 1 T PO BID, Disp: , Rfl: 0    PROAIR HFA 90 mcg/actuation inhaler, Inhale 1 puff into the lungs every 4 (four) hours as needed. , Disp: , Rfl: 0    simvastatin (ZOCOR) 40 MG tablet, , Disp: , Rfl: 5    sucralfate (CARAFATE) 1 gram tablet, TK 1 T PO QID AC AND HS, Disp: , Rfl: 3    SYMBICORT 160-4.5 mcg/actuation HFAA, Inhale 2 puffs into the lungs once daily. , Disp: , Rfl: 0    tramadol (ULTRAM) 50 mg tablet, , Disp: , Rfl: 1    aspirin 81 MG Chew, Take 1 tablet (81 mg total) by mouth once daily., Disp: , Rfl: 0    metoprolol succinate (TOPROL-XL) 25 MG 24 hr tablet, Take 1 tablet (25 mg total) by mouth once daily., Disp: 30 tablet, Rfl: 11    topiramate (TOPAMAX) 25 MG tablet, Take 1 tablet (25 mg total) by mouth  "every evening for 7 days, THEN 2 tablets (50 mg total) every evening., Disp: 180 tablet, Rfl: 3  No current facility-administered medications for this visit.     Facility-Administered Medications Ordered in Other Visits:     0.9%  NaCl infusion, , Intravenous, Continuous, eDb Bolivar MD, Last Rate: 20 mL/hr at 08/17/18 0904    sodium chloride 0.9% flush 3 mL, 3 mL, Intravenous, PRN, Deb Bolivar MD  Review of patient's allergies indicates:   Allergen Reactions    Lipitor [atorvastatin]     Statins-hmg-coa reductase inhibitors        Ht 5' 11" (1.803 m)   Wt 101.6 kg (224 lb)   LMP  (LMP Unknown)   BMI 31.24 kg/m²     Review of Systems   Constitution: Negative for chills and fever.   Cardiovascular: Negative for chest pain and palpitations.   Respiratory: Negative for shortness of breath and wheezing.    Skin: Negative for poor wound healing and rash.   Musculoskeletal: Positive for joint pain and joint swelling.   Gastrointestinal: Negative for nausea and vomiting.   Genitourinary: Negative for dysuria and hematuria.   Neurological: Positive for paresthesias. Negative for seizures and tremors.   Psychiatric/Behavioral: Negative for altered mental status.   Allergic/Immunologic: Negative for environmental allergies and persistent infections.         Objective:    Ortho Exam   Bilateral hands: Mild volar wrist swelling. Positive Tinnel's. No thenar atrophy noted. Sensation intact. ROM wrist and fingers full. Pulses present.  GEN: Well developed, well nourished female. AAOX3. No acute distress.   Normocephalic, atraumatic.   SHAQUILLE  Breathing unlabored.  Mood and affect appropriate.         Assessment:     Imaging: No imaging        1. Bilateral carpal tunnel syndrome          Plan:       Orders Placed This Encounter    triamcinolone acetonide injection 40 mg      I explained the nature of the problem to the pt in regard to bilateral carpal tunnel.   I recommended and preformed corticosteroid " injections without complication.   I recommended night time bracing.   OTC NSAID as needed.  EMG if sx persist.     Follow up in about 6 weeks (around 1/27/2020).

## 2019-12-17 ENCOUNTER — PATIENT MESSAGE (OUTPATIENT)
Dept: SLEEP MEDICINE | Facility: CLINIC | Age: 49
End: 2019-12-17

## 2019-12-26 ENCOUNTER — TELEPHONE (OUTPATIENT)
Dept: NEUROLOGY | Facility: CLINIC | Age: 49
End: 2019-12-26

## 2019-12-26 NOTE — TELEPHONE ENCOUNTER
----- Message from Hayes Martin sent at 12/26/2019 10:11 AM CST -----  Contact: [phone] / abqo  Patient states she is experiencing frequent and painful headaches, pt would like to speak with a nurse for medical advice. Please Advise.

## 2019-12-26 NOTE — TELEPHONE ENCOUNTER
Patient states she is experiencing frequent and painful headaches. There was no answer and I left a message.

## 2019-12-26 NOTE — TELEPHONE ENCOUNTER
----- Message from Pat Phipps sent at 12/26/2019  2:11 PM CST -----  Contact: Self [phone]  Patient Returning Your Phone Call

## 2019-12-26 NOTE — TELEPHONE ENCOUNTER
I returned the patient's call again in regards to frequent and painful headaches. Dr. Echeverria next available for a follow up appointment is 01/272020. She decided to keep the 01/14/2020 at 11:00 and she is on the wait list.

## 2020-01-07 ENCOUNTER — LAB VISIT (OUTPATIENT)
Dept: LAB | Facility: HOSPITAL | Age: 50
End: 2020-01-07
Attending: PSYCHIATRY & NEUROLOGY
Payer: MEDICARE

## 2020-01-07 ENCOUNTER — OFFICE VISIT (OUTPATIENT)
Dept: NEUROLOGY | Facility: CLINIC | Age: 50
End: 2020-01-07
Payer: MEDICARE

## 2020-01-07 VITALS
HEART RATE: 67 BPM | SYSTOLIC BLOOD PRESSURE: 124 MMHG | HEIGHT: 71 IN | DIASTOLIC BLOOD PRESSURE: 74 MMHG | BODY MASS INDEX: 31.14 KG/M2 | WEIGHT: 222.44 LBS

## 2020-01-07 DIAGNOSIS — R20.2 PARESTHESIAS: ICD-10-CM

## 2020-01-07 DIAGNOSIS — G62.89 OTHER POLYNEUROPATHY: ICD-10-CM

## 2020-01-07 DIAGNOSIS — R20.2 PARESTHESIAS: Primary | ICD-10-CM

## 2020-01-07 LAB — TSH SERPL DL<=0.005 MIU/L-ACNC: 0.52 UIU/ML (ref 0.4–4)

## 2020-01-07 PROCEDURE — 82607 VITAMIN B-12: CPT

## 2020-01-07 PROCEDURE — 3074F SYST BP LT 130 MM HG: CPT | Mod: CPTII,S$GLB,, | Performed by: PSYCHIATRY & NEUROLOGY

## 2020-01-07 PROCEDURE — 99999 PR PBB SHADOW E&M-EST. PATIENT-LVL III: CPT | Mod: PBBFAC,,, | Performed by: PSYCHIATRY & NEUROLOGY

## 2020-01-07 PROCEDURE — 3074F PR MOST RECENT SYSTOLIC BLOOD PRESSURE < 130 MM HG: ICD-10-PCS | Mod: CPTII,S$GLB,, | Performed by: PSYCHIATRY & NEUROLOGY

## 2020-01-07 PROCEDURE — 3008F BODY MASS INDEX DOCD: CPT | Mod: CPTII,S$GLB,, | Performed by: PSYCHIATRY & NEUROLOGY

## 2020-01-07 PROCEDURE — 3008F PR BODY MASS INDEX (BMI) DOCUMENTED: ICD-10-PCS | Mod: CPTII,S$GLB,, | Performed by: PSYCHIATRY & NEUROLOGY

## 2020-01-07 PROCEDURE — 3078F DIAST BP <80 MM HG: CPT | Mod: CPTII,S$GLB,, | Performed by: PSYCHIATRY & NEUROLOGY

## 2020-01-07 PROCEDURE — 84443 ASSAY THYROID STIM HORMONE: CPT

## 2020-01-07 PROCEDURE — 99214 PR OFFICE/OUTPT VISIT, EST, LEVL IV, 30-39 MIN: ICD-10-PCS | Mod: S$GLB,,, | Performed by: PSYCHIATRY & NEUROLOGY

## 2020-01-07 PROCEDURE — 99214 OFFICE O/P EST MOD 30 MIN: CPT | Mod: S$GLB,,, | Performed by: PSYCHIATRY & NEUROLOGY

## 2020-01-07 PROCEDURE — 3078F PR MOST RECENT DIASTOLIC BLOOD PRESSURE < 80 MM HG: ICD-10-PCS | Mod: CPTII,S$GLB,, | Performed by: PSYCHIATRY & NEUROLOGY

## 2020-01-07 PROCEDURE — 99999 PR PBB SHADOW E&M-EST. PATIENT-LVL III: ICD-10-PCS | Mod: PBBFAC,,, | Performed by: PSYCHIATRY & NEUROLOGY

## 2020-01-07 PROCEDURE — 82746 ASSAY OF FOLIC ACID SERUM: CPT

## 2020-01-07 PROCEDURE — 84425 ASSAY OF VITAMIN B-1: CPT

## 2020-01-07 RX ORDER — GABAPENTIN 300 MG/1
300 CAPSULE ORAL 3 TIMES DAILY
Qty: 90 CAPSULE | Refills: 3 | Status: SHIPPED | OUTPATIENT
Start: 2020-01-07

## 2020-01-07 RX ORDER — TOPIRAMATE 50 MG/1
TABLET, FILM COATED ORAL
Qty: 180 TABLET | Refills: 3 | Status: SHIPPED | OUTPATIENT
Start: 2020-01-07 | End: 2020-03-10

## 2020-01-07 RX ORDER — LANOLIN ALCOHOL/MO/W.PET/CERES
400 CREAM (GRAM) TOPICAL DAILY
Qty: 90 TABLET | Refills: 3 | Status: SHIPPED | OUTPATIENT
Start: 2020-01-07

## 2020-01-07 NOTE — PROGRESS NOTES
[facility] NEUROLOGY  [facility], [place]    Date: 1/7/20  Patient Name: Sindhu Diaz   MRN: [id number]   PCP: ADRIANA Carson  Referring Provider: No ref. provider found    Assessment:   Sindhu Diaz is a 49 y.o. female presenting in follow-up for multiple neurologic complaints.  Will resume Topamax with magnesium for migraine prevention.  Patient declines riboflavin.  Counseled patient on over-the-counter analgesics.  Will obtain screening labs listed below for evaluation of possible underlying polyneuropathy as well as EMG/nerve conduction study to rule out underlying neuropathy in addition to patient's known radiculopathies and meralgia paresthetica.  Plan:     Problem List Items Addressed This Visit     None      Visit Diagnoses     Paresthesias    -  Primary    Relevant Orders    Vitamin B12    Vitamin B1    Folate    TSH (Completed)    EMG W/ ULTRASOUND AND NERVE CONDUCTION TEST 2 Extremities    Other polyneuropathy        Relevant Orders    EMG W/ ULTRASOUND AND NERVE CONDUCTION TEST 2 Extremities        Regino Echeverria MD  [facility]   Department of Neurology    Patient note was created using MModal Dictation.  Any errors in syntax or even information may not have been identified and edited on initial review prior to signing this note.  Subjective:        HPI:   Ms. Sindhu Diaz is a 49 y.o. female follow-up for management of multiple neurologic issues.  Since patient's last visit she reports an increase in the frequency of her migraines.  She states she stopped taking Topamax after only a few weeks after noting no improvement her headache frequency unaware that her dose could be further increased.  She is interested in resuming this today.  The patient is now describing length-dependent paresthesias and subjective sensory loss from her toes to her knees bilaterally in addition to her known meralgia paresthetica without radicular features are distribution.  She states the  pain is keeping her awake and while she has been given gabapentin in the past for similar neuropathic pain she has been afraid to take it.  She has not attempted it recently    PAST MEDICAL HISTORY:  Past Medical History:   Diagnosis Date    Asthma     GERD (gastroesophageal reflux disease)     Hyperlipidemia     IBS (irritable bowel syndrome)     Sleep apnea     Tension headache, chronic     Vaginal delivery     x3       PAST SURGICAL HISTORY:  Past Surgical History:   Procedure Laterality Date    BUNIONECTOMY Bilateral     CHOLECYSTECTOMY      COLONOSCOPY N/A 8/17/2018    Procedure: COLONOSCOPY;  Surgeon: Deb Bolivar MD;  Location: [facility];  Service: Endoscopy;  Laterality: N/A;    HYSTERECTOMY         CURRENT MEDS:  Current Outpatient Medications   Medication Sig Dispense Refill    albuterol sulfate 2.5 mg/0.5 mL Nebu Take 2.5 mg by nebulization every 4 (four) hours as needed. Rescue 30 each 0    amoxicillin-clavulanate 875-125mg (AUGMENTIN) 875-125 mg per tablet Take 1 tablet by mouth 2 (two) times daily. 14 tablet 0    benzonatate (TESSALON) 100 MG capsule Take 1 capsule (100 mg total) by mouth 3 (three) times daily as needed. 30 capsule 0    gabapentin (NEURONTIN) 300 MG capsule Take 1 capsule (300 mg total) by mouth 3 (three) times daily. 90 capsule 3    hydrocodone-acetaminophen 10-325mg (NORCO)  mg Tab   0    LINZESS 290 mcg Cap TK ONE C PO QD ON EMPTY STOMACH  2    ondansetron (ZOFRAN) 4 MG tablet Take 1 tablet (4 mg total) by mouth every 6 (six) hours as needed for Nausea. 12 tablet 0    oxybutynin (DITROPAN XL) 15 MG TR24 TK 1 T PO BID  0    PROAIR HFA 90 mcg/actuation inhaler Inhale 1 puff into the lungs every 4 (four) hours as needed.   0    sucralfate (CARAFATE) 1 gram tablet TK 1 T PO QID AC AND HS  3    SYMBICORT 160-4.5 mcg/actuation HFAA Inhale 2 puffs into the lungs once daily.   0    tramadol (ULTRAM) 50 mg tablet   1    aspirin 81 MG Chew Take 1 tablet  (81 mg total) by mouth once daily.  0    magnesium oxide (MAG-OX) 400 mg (241.3 mg magnesium) tablet Take 1 tablet (400 mg total) by mouth once daily. 90 tablet 3    metoprolol succinate (TOPROL-XL) 25 MG 24 hr tablet Take 1 tablet (25 mg total) by mouth once daily. 30 tablet 11    simvastatin (ZOCOR) 40 MG tablet   5    topiramate (TOPAMAX) 50 MG tablet 25 mg QHS  x 7 days, 50 mg QHS x7 days, then 100 mg  tablet 3     No current facility-administered medications for this visit.      Facility-Administered Medications Ordered in Other Visits   Medication Dose Route Frequency Provider Last Rate Last Dose    0.9%  NaCl infusion   Intravenous Continuous Deb Bolivar MD 20 mL/hr at 08/17/18 0904      sodium chloride 0.9% flush 3 mL  3 mL Intravenous PRN Deb Bolivar MD           ALLERGIES:  Review of patient's allergies indicates:   Allergen Reactions    Lipitor [atorvastatin]     Statins-hmg-coa reductase inhibitors        FAMILY HISTORY:  Family History   Problem Relation Age of Onset    Heart attack Father         at 70     Lymphoma Mother     Cataracts Mother     Heart disease Sister     No Known Problems Brother     No Known Problems Maternal Aunt     No Known Problems Maternal Uncle     No Known Problems Paternal Aunt     No Known Problems Paternal Uncle     No Known Problems Maternal Grandmother     No Known Problems Maternal Grandfather     No Known Problems Paternal Grandmother     No Known Problems Paternal Grandfather     Breast cancer Neg Hx     Colon cancer Neg Hx     Ovarian cancer Neg Hx     Amblyopia Neg Hx     Blindness Neg Hx     Cancer Neg Hx     Diabetes Neg Hx     Glaucoma Neg Hx     Hypertension Neg Hx     Macular degeneration Neg Hx     Retinal detachment Neg Hx     Strabismus Neg Hx     Stroke Neg Hx     Thyroid disease Neg Hx        SOCIAL HISTORY:  Social History     Tobacco Use    Smoking status: Former Smoker     Years: 15.00     Types:  "Cigarettes     Last attempt to quit: 2002     Years since quittin.0    Smokeless tobacco: Never Used    Tobacco comment: quit when she was 33 yrs old.   Substance Use Topics    Alcohol use: No     Comment: rarely    Drug use: No       Review of Systems:  12 system review of systems is negative except for the symptoms mentioned in HPI.      Objective:     Vitals:    20 1051   BP: 124/74   Pulse: 67   Weight: 100.9 kg (222 lb 7.1 oz)   Height: 5' 11" (1.803 m)     General: NAD, well nourished   Eyes: no tearing, discharge, no erythema   ENT: moist mucous membranes of the oral cavity, nares patent    Neck: Supple, full range of motion  Cardiovascular: Warm and well perfused, pulses equal and symmetrical  Lungs: Normal work of breathing, normal chest wall excursions  Skin: No rash, lesions, or breakdown on exposed skin  Psychiatry: Mood and affect are appropriate   Abdomen: soft, non tender, non distended  Extremeties: No cyanosis, clubbing or edema.    Neurological   MENTAL STATUS: Alert and oriented to person, place, and time. Attention and concentration within normal limits. Speech without dysarthria, able to name and repeat without difficulty. Recent and remote memory within normal limits   CRANIAL NERVES: Visual fields intact. PERRL. EOMI. Facial sensation intact. Face symmetrical. Hearing grossly intact. Full shoulder shrug bilaterally. Tongue protrudes midline   SENSORY: Sensation reduced to all modalities to knees bilaterally. Subjective paresthesias on lateral thighs bilaterally.   MOTOR: Normal bulk and tone. 5/5 deltoid, biceps, triceps, interosseous, hand  bilaterally. 5/5 iliopsoas, knee extension/flexion, foot dorsi/plantarflexion bilaterally.    REFLEXES: Symmetric and 1 throughout.  CEREBELLAR/COORDINATION/GAIT: Gait steady with normal arm swing and stride length. Finger to nose intact. Normal rapid alternating movements.         "

## 2020-01-08 LAB
FOLATE SERPL-MCNC: 5.4 NG/ML (ref 4–24)
VIT B12 SERPL-MCNC: 522 PG/ML (ref 210–950)

## 2020-01-10 LAB — VIT B1 BLD-MCNC: 47 UG/L (ref 38–122)

## 2020-02-05 ENCOUNTER — PROCEDURE VISIT (OUTPATIENT)
Dept: NEUROLOGY | Facility: CLINIC | Age: 50
End: 2020-02-05
Payer: MEDICARE

## 2020-02-05 DIAGNOSIS — G62.89 OTHER POLYNEUROPATHY: ICD-10-CM

## 2020-02-05 DIAGNOSIS — R20.2 PARESTHESIAS: ICD-10-CM

## 2020-02-05 PROCEDURE — 95910 PR NERVE CONDUCTION STUDY; 7-8 STUDIES: ICD-10-PCS | Mod: S$GLB,,, | Performed by: PSYCHIATRY & NEUROLOGY

## 2020-02-05 PROCEDURE — 95910 NRV CNDJ TEST 7-8 STUDIES: CPT | Mod: S$GLB,,, | Performed by: PSYCHIATRY & NEUROLOGY

## 2020-02-05 PROCEDURE — 95886 MUSC TEST DONE W/N TEST COMP: CPT | Mod: S$GLB,,, | Performed by: PSYCHIATRY & NEUROLOGY

## 2020-02-05 PROCEDURE — 95886 PR EMG COMPLETE, W/ NERVE CONDUCTION STUDIES, 5+ MUSCLES: ICD-10-PCS | Mod: S$GLB,,, | Performed by: PSYCHIATRY & NEUROLOGY

## 2020-03-06 ENCOUNTER — OFFICE VISIT (OUTPATIENT)
Dept: GASTROENTEROLOGY | Facility: CLINIC | Age: 50
End: 2020-03-06
Payer: MEDICARE

## 2020-03-06 DIAGNOSIS — R10.9 ABDOMINAL PAIN, UNSPECIFIED ABDOMINAL LOCATION: Primary | ICD-10-CM

## 2020-03-06 PROCEDURE — 99999 PR PBB SHADOW E&M-EST. PATIENT-LVL III: CPT | Mod: PBBFAC,,, | Performed by: INTERNAL MEDICINE

## 2020-03-06 PROCEDURE — 99215 OFFICE O/P EST HI 40 MIN: CPT | Mod: S$GLB,,, | Performed by: INTERNAL MEDICINE

## 2020-03-06 PROCEDURE — 99215 PR OFFICE/OUTPT VISIT, EST, LEVL V, 40-54 MIN: ICD-10-PCS | Mod: S$GLB,,, | Performed by: INTERNAL MEDICINE

## 2020-03-06 PROCEDURE — 99999 PR PBB SHADOW E&M-EST. PATIENT-LVL III: ICD-10-PCS | Mod: PBBFAC,,, | Performed by: INTERNAL MEDICINE

## 2020-03-06 RX ORDER — IMIPRAMINE HYDROCHLORIDE 25 MG/1
25 TABLET, FILM COATED ORAL NIGHTLY
Qty: 30 TABLET | Refills: 11 | Status: SHIPPED | OUTPATIENT
Start: 2020-03-06 | End: 2021-05-05

## 2020-03-06 NOTE — PROGRESS NOTES
Subjective:       Patient ID: Sindhu Diaz is a 49 y.o. female.    Chief Complaint: Gastroesophageal Reflux    Patient here today to follow-up with aforementioned complaints.  Patient was previously seen by nurse practitioner Harry as recently as 2018.  At that time she was complaining of significant GERD that was refractory to PPI/H2Rb.  She was sent to Dr. Ramirez who referred her for manometry/pH impedance.  Bravo was performed off PPI which showed non acid reflux.  Manometry was normal.  She was subsequently lost to follow-up.    Today patient reports symptoms have persisted.  She continues to experience daily GERD with associated dysphagia.  She reports her symptoms have not changed since what she was experiencing at the time of her previous normal manometry and Bravo test.  She denies any mostly weight loss.  He rule returned Prilosec or Gaviscon as they were both ineffective.  She continues to take sulfate with some, temporary improvement in her complaints.  She also avoids trigger foods and eating late in the evening.  She no longer drinks carbonated beverages.    Alternatively patient does report history of IBS C.  She is currently taking Linzess with significant improvement.  She reportedly has bowel movements every few days.    Finally her most recent colonoscopy was in August 2018.  By report was normal.  Ten year recall interval was recommended.    EKG 01/2020  QTc Int : 434 ms    Review of Systems   Constitutional: Negative for chills, fever and unexpected weight change.   HENT: Negative for congestion and trouble swallowing.    Respiratory: Negative for cough and shortness of breath.    Cardiovascular: Positive for chest pain. Negative for palpitations.   Gastrointestinal: Positive for abdominal pain and constipation. Negative for abdominal distention and blood in stool.         The following portions of the patient's history were reviewed and updated as appropriate: allergies, current  medications, past family history, past medical history, past social history, past surgical history and problem list.    Objective:      Physical Exam   Constitutional: She is oriented to person, place, and time. She appears well-developed and well-nourished.   HENT:   Head: Normocephalic and atraumatic.   Eyes: Pupils are equal, round, and reactive to light. EOM are normal. No scleral icterus.   Cardiovascular: Normal rate, regular rhythm, normal heart sounds and intact distal pulses.   Pulmonary/Chest: Effort normal and breath sounds normal. No respiratory distress.   Abdominal: Soft. Bowel sounds are normal. She exhibits no distension. There is no tenderness.   Musculoskeletal: Normal range of motion. She exhibits no edema.   Neurological: She is alert and oriented to person, place, and time.   No asterixis   Psychiatric: She has a normal mood and affect. Her behavior is normal.   Nursing note and vitals reviewed.        Pertinent labs and imaging studies reviewed    Assessment:       1. Abdominal pain, unspecified abdominal location        Plan:       Etiology of complaints likely function given normal manometry and evidence of non acid reflux on Bravo  Discussed with patient.  We could certainly repeat endoscopy and resume antacid medications however low suspicion he thing new will be found and available data suggest issue was not associated with acid hyper production  Will place on trial of imipramine  Counseled that medication can precipitate arrhythmia so will check EKG after 2 weeks of therapy.  If no significant prolongation of QTC will consider increasing  Counseled that medication may also worsened constipation.  If is an issue she is to stop medication  Return to clinic in 6 weeks    40 minutes were spent in coordination of patient's care, record review and counseling.  More than 50% of the time was face-to-face.    (Portions of this note were dictated using voice recognition software and may contain  dictation related errors in spelling/grammar/syntax not found on text review)

## 2020-03-23 ENCOUNTER — TELEPHONE (OUTPATIENT)
Dept: GASTROENTEROLOGY | Facility: CLINIC | Age: 50
End: 2020-03-23

## 2020-04-07 ENCOUNTER — TELEPHONE (OUTPATIENT)
Dept: NEUROLOGY | Facility: CLINIC | Age: 50
End: 2020-04-07

## 2020-04-07 ENCOUNTER — OFFICE VISIT (OUTPATIENT)
Dept: NEUROLOGY | Facility: CLINIC | Age: 50
End: 2020-04-07
Payer: MEDICARE

## 2020-04-07 DIAGNOSIS — G47.69 SLEEP-RELATED MOVEMENT DISORDER: ICD-10-CM

## 2020-04-07 PROCEDURE — 99213 OFFICE O/P EST LOW 20 MIN: CPT | Mod: 95,,, | Performed by: PSYCHIATRY & NEUROLOGY

## 2020-04-07 PROCEDURE — 99213 PR OFFICE/OUTPT VISIT, EST, LEVL III, 20-29 MIN: ICD-10-PCS | Mod: 95,,, | Performed by: PSYCHIATRY & NEUROLOGY

## 2020-04-07 NOTE — PROGRESS NOTES
[facility] Department of Neurology  Virtual Visit      [facility] NEUROLOGY  [facility], [place]    Date: 4/7/20  Patient Name: Sindhu Diaz   MRN: [id number]   PCP: Esvin Garcia Iii  Referring Provider: No ref. provider found    The patient location is: Home  The chief complaint leading to consultation is: Abnormal movements  Visit type: Virtual visit with synchronous audio and video  Total time spent with patient: 11 minutes  Each patient to whom he or she provides medical services by telemedicine is:  (1) informed of the relationship between the physician and patient and the respective role of any other health care provider with respect to management of the patient; and (2) notified that he or she may decline to receive medical services by telemedicine and may withdraw from such care at any time.    Notes:     Assessment:   Sindhu Diaz is a 49 y.o. female  Presenting for evaluation of abnormal movements out of sleep.  Have asked patient to have her  video of the movements should they recur.  Will continue current Topamax with no changes.  All questions were answered.    Plan:     Problem List Items Addressed This Visit        Other    Sleep-related movement disorder    Current Assessment & Plan     -- patient to have  video  -- may consider sleep study (Deferring due to current pandemic)             Regino Echeverria MD  [facility]   Department of Neurology    Patient note was created using MModal Dictation.  Any errors in syntax or even information may not have been identified and edited on initial review prior to signing this note.  Subjective:   HPI:   Ms. Sindhu Diaz is a 49 y.o. female presenting in follow-up.  The patient reports that she has been doing well since her last visit.  The patient believes that she recently had COVID.  She was never swabbed but states that multiple relatives were positive and she had all of the classic symptoms including  shortness of breath and fever.  She states her paresthesias are currently well controlled.  She did undergo EMG which was unremarkable.  She states that most recently, her  has noted that she is jerking in her sleep.  She states that when this occurs he is immediately able to wake her.  She denies tongue biting or incontinence.  She does state that she has a family history of jerkiness but is uncertain if her family members carry a specific diagnosis.  This does not occur while she is awake.    PAST MEDICAL HISTORY:  Past Medical History:   Diagnosis Date    Asthma     GERD (gastroesophageal reflux disease)     Hyperlipidemia     IBS (irritable bowel syndrome)     Sleep apnea     Tension headache, chronic     Vaginal delivery     x3       PAST SURGICAL HISTORY:  Past Surgical History:   Procedure Laterality Date    BUNIONECTOMY Bilateral     CHOLECYSTECTOMY      COLONOSCOPY N/A 8/17/2018    Procedure: COLONOSCOPY;  Surgeon: Deb Bolivar MD;  Location: [facility];  Service: Endoscopy;  Laterality: N/A;    HYSTERECTOMY         CURRENT MEDS:  Current Outpatient Medications   Medication Sig Dispense Refill    albuterol sulfate 2.5 mg/0.5 mL Nebu Take 2.5 mg by nebulization every 4 (four) hours as needed. Rescue 30 each 0    aspirin 81 MG Chew Take 1 tablet (81 mg total) by mouth once daily.  0    gabapentin (NEURONTIN) 300 MG capsule Take 1 capsule (300 mg total) by mouth 3 (three) times daily. 90 capsule 3    hydrocodone-acetaminophen 10-325mg (NORCO)  mg Tab   0    imipramine (TOFRANIL) 25 MG tablet Take 1 tablet (25 mg total) by mouth every evening. 30 tablet 11    LINZESS 290 mcg Cap TK ONE C PO QD ON EMPTY STOMACH  2    magnesium oxide (MAG-OX) 400 mg (241.3 mg magnesium) tablet Take 1 tablet (400 mg total) by mouth once daily. 90 tablet 3    metoprolol succinate (TOPROL-XL) 25 MG 24 hr tablet Take 1 tablet (25 mg total) by mouth once daily. 30 tablet 11    oxybutynin  (DITROPAN XL) 15 MG TR24 TK 1 T PO BID  0    PROAIR HFA 90 mcg/actuation inhaler Inhale 1 puff into the lungs every 4 (four) hours as needed.   0    simvastatin (ZOCOR) 40 MG tablet   5    sucralfate (CARAFATE) 1 gram tablet TK 1 T PO QID AC AND HS  3    SYMBICORT 160-4.5 mcg/actuation HFAA Inhale 2 puffs into the lungs once daily.   0    tramadol (ULTRAM) 50 mg tablet   1     No current facility-administered medications for this visit.      Facility-Administered Medications Ordered in Other Visits   Medication Dose Route Frequency Provider Last Rate Last Dose    0.9%  NaCl infusion   Intravenous Continuous Deb Bolivar MD 20 mL/hr at 08/17/18 0904      sodium chloride 0.9% flush 3 mL  3 mL Intravenous PRN Deb Bolivar MD           ALLERGIES:  Review of patient's allergies indicates:   Allergen Reactions    Lipitor [atorvastatin]     Statins-hmg-coa reductase inhibitors        FAMILY HISTORY:  Family History   Problem Relation Age of Onset    Heart attack Father         at 70     Lymphoma Mother     Cataracts Mother     Heart disease Sister     No Known Problems Brother     No Known Problems Maternal Aunt     No Known Problems Maternal Uncle     No Known Problems Paternal Aunt     No Known Problems Paternal Uncle     No Known Problems Maternal Grandmother     No Known Problems Maternal Grandfather     No Known Problems Paternal Grandmother     No Known Problems Paternal Grandfather     Breast cancer Neg Hx     Colon cancer Neg Hx     Ovarian cancer Neg Hx     Amblyopia Neg Hx     Blindness Neg Hx     Cancer Neg Hx     Diabetes Neg Hx     Glaucoma Neg Hx     Hypertension Neg Hx     Macular degeneration Neg Hx     Retinal detachment Neg Hx     Strabismus Neg Hx     Stroke Neg Hx     Thyroid disease Neg Hx        SOCIAL HISTORY:  Social History     Tobacco Use    Smoking status: Former Smoker     Years: 15.00     Types: Cigarettes     Last attempt to quit: 2002      Years since quittin.2    Smokeless tobacco: Never Used    Tobacco comment: quit when she was 33 yrs old.   Substance Use Topics    Alcohol use: No     Comment: rarely    Drug use: No       Review of Systems:  12 system review of systems is negative except for the symptoms mentioned in HPI.      Objective:   There were no vitals filed for this visit.  General: NAD, well nourished   Eyes: no tearing, discharge, no erythema   ENT: moist mucous membranes of the oral cavity, nares patent    Neck: Supple, full range of motion  Cardiovascular: Appears well perfused  Lungs: Normal work of breathing, normal chest wall excursions  Skin: No rash, lesions, or breakdown on exposed skin  Psychiatry: Mood and affect are appropriate   Abdomen: nondistended, non tender  Extremeties: No cyanosis, clubbing or edema visible    Neurological   MENTAL STATUS: Alert and oriented to person, place, and time. Attention and concentration within normal limits. Speech without dysarthria, able to name and repeat without difficulty. Recent and remote memory within normal limits   CRANIAL NERVES: Visual fields intact. PERRL. EOMI. Facial sensation intact. Face symmetrical. Hearing grossly intact. Full shoulder shrug bilaterally. Tongue protrudes midline   SENSORY: Sensation is intact to light touch throughout.   MOTOR: Normal bulk. No abnormal movements seen  5/5 deltoid, biceps, triceps, interosseous, hand  bilaterally. 5/5 iliopsoas, knee extension/flexion, foot dorsi/plantarflexion bilaterally.    REFLEXES: Deferred due to virtual visit  CEREBELLAR/COORDINATION/GAIT: Gait steady with normal arm swing and stride length. Finger to nose intact. Normal rapid alternating movements.

## 2020-04-07 NOTE — TELEPHONE ENCOUNTER
I called and left a message for the patient to call back so I can schedule the visit as a virtual visit.

## 2020-05-05 ENCOUNTER — TELEPHONE (OUTPATIENT)
Dept: PULMONOLOGY | Facility: CLINIC | Age: 50
End: 2020-05-05

## 2020-05-05 NOTE — TELEPHONE ENCOUNTER
----- Message from Kristin Charles sent at 5/5/2020  9:51 AM CDT -----  Contact: Pt [phone]  Pt is calling to see if she can get a sooner apt than [facility]. Please call back to discuss

## 2020-05-05 NOTE — TELEPHONE ENCOUNTER
Spoke with patient, informed her that I have received her message. Patient appointment has been rescheduled with JON Jeffery on 5/6/20 for 9:00 am. Patient verbalized that she  Understand and excepted the appointment.

## 2020-05-06 ENCOUNTER — OFFICE VISIT (OUTPATIENT)
Dept: PULMONOLOGY | Facility: CLINIC | Age: 50
End: 2020-05-06
Payer: MEDICARE

## 2020-05-06 ENCOUNTER — TELEPHONE (OUTPATIENT)
Dept: PULMONOLOGY | Facility: CLINIC | Age: 50
End: 2020-05-06

## 2020-05-06 DIAGNOSIS — R05.9 COUGH: Primary | ICD-10-CM

## 2020-05-06 DIAGNOSIS — J45.40 MODERATE PERSISTENT ASTHMA WITHOUT COMPLICATION: ICD-10-CM

## 2020-05-06 PROCEDURE — 99203 OFFICE O/P NEW LOW 30 MIN: CPT | Mod: 95,,, | Performed by: NURSE PRACTITIONER

## 2020-05-06 PROCEDURE — 99203 PR OFFICE/OUTPT VISIT, NEW, LEVL III, 30-44 MIN: ICD-10-PCS | Mod: 95,,, | Performed by: NURSE PRACTITIONER

## 2020-05-06 NOTE — TELEPHONE ENCOUNTER
I left patient a message on her voicemail, informing her that I have been advised to contact and schedule her Pulmonary Function Tests. I advised patient that in order for her to have PFT'S done, she has to take a Covid-19 test. I also advised patient that if she has any questions or concerns, she may contact the office. Office number has been provided.

## 2020-05-06 NOTE — PROGRESS NOTES
The patient location is: [place], [place]  The chief complaint leading to consultation is: asthma exacerbation  Visit type: audiovisual  Total time spent with patient: 17  Each patient to whom he or she provides medical services by telemedicine is:  (1) informed of the relationship between the physician and patient and the respective role of any other health care provider with respect to management of the patient; and (2) notified that he or she may decline to receive medical services by telemedicine and may withdraw from such care at any time.    Notes:     Hx of asthma, was not tested for COVID, had all the symptoms (cough, fever, shortness of breath)  Using inhaler 3-4 times a day  Doing 3 nebs daily  Uses Symbicort BID as prescribed.   Had a fall last month, fell directly on her chest after tripping and is unsure if this contributed to her worsening asthma symptoms.     Diagnosed with asthma many years, smoked 25 years ago    Cough variant asthma, chest tightness, does not wheeze or typically get short of breath  3 weeks fo severe illness, then started getting better in April, feels like things are getting worse    Had a root canal and took some extra abx (PCN) in an effort to get herself feeling better    Had 2 doses of prednisone per her PCP but had to stop due to tachycardia  Always managed as outpatient, never has been admitted for an asthma exacerbation. No intubations    Review of Systems   Constitutional: Positive for malaise/fatigue. Negative for chills and fever.   Respiratory: Positive for cough. Negative for sputum production, shortness of breath and wheezing.        Physical exam limited due to virtual visit.  Patient AAOx4, speaking in full sentences, no distress noted.      Problem List Items Addressed This Visit        Pulmonary    Asthma    Overview     Await PFTs  Continue Symbicort 2 puffs BID  Continue PRN AUSTEN  CXR when able         Current Assessment & Plan     Unsure if COVID-19 infection  was present and contributing to a worsening of symptoms.  Patient reports that she has been able to get out and walk daily without much incident.  Discussed with patient that at the current time a COVID-19 swab is required prior to completing procedures in clinic.  At this time she is hesitant to complete it as she does not feel that she has symptoms that require testing.  Discussed that this test is merely for screening and in an effort to help control the spread of disease.            Other Visit Diagnoses     Cough    -  Primary    Relevant Orders    Spirometry with/without bronchodilator    LUNG VOLUMES    DLCO-Carbon Monoxide Diffusing Capacity    X-Ray Chest PA And Lateral    COVID-19 Routine Screening

## 2020-05-06 NOTE — ASSESSMENT & PLAN NOTE
Unsure if COVID-19 infection was present and contributing to a worsening of symptoms.  Patient reports that she has been able to get out and walk daily without much incident.  Discussed with patient that at the current time a COVID-19 swab is required prior to completing procedures in clinic.  At this time she is hesitant to complete it as she does not feel that she has symptoms that require testing.  Discussed that this test is merely for screening and in an effort to help control the spread of disease.

## 2020-05-07 ENCOUNTER — TELEPHONE (OUTPATIENT)
Dept: PULMONOLOGY | Facility: CLINIC | Age: 50
End: 2020-05-07

## 2020-05-07 NOTE — TELEPHONE ENCOUNTER
Left message on patient voicemail, informing her that I'm contacting her in regards to scheduling her Chelsey that JON Jeffery wants her to have. I advised patient that if she wants to schedule appointment or if she has any questions or concerns, she may contact the office.

## 2020-05-13 ENCOUNTER — OFFICE VISIT (OUTPATIENT)
Dept: CARDIOLOGY | Facility: CLINIC | Age: 50
End: 2020-05-13
Payer: MEDICARE

## 2020-05-13 VITALS
HEIGHT: 71 IN | DIASTOLIC BLOOD PRESSURE: 66 MMHG | OXYGEN SATURATION: 99 % | HEART RATE: 68 BPM | SYSTOLIC BLOOD PRESSURE: 147 MMHG | WEIGHT: 218.06 LBS | BODY MASS INDEX: 30.53 KG/M2

## 2020-05-13 DIAGNOSIS — R00.2 PALPITATIONS: Primary | ICD-10-CM

## 2020-05-13 DIAGNOSIS — E78.00 PURE HYPERCHOLESTEROLEMIA: ICD-10-CM

## 2020-05-13 DIAGNOSIS — R07.89 CHEST PAIN, ATYPICAL: ICD-10-CM

## 2020-05-13 PROCEDURE — 3077F PR MOST RECENT SYSTOLIC BLOOD PRESSURE >= 140 MM HG: ICD-10-PCS | Mod: CPTII,S$GLB,, | Performed by: INTERNAL MEDICINE

## 2020-05-13 PROCEDURE — 99999 PR PBB SHADOW E&M-EST. PATIENT-LVL IV: ICD-10-PCS | Mod: PBBFAC,,, | Performed by: INTERNAL MEDICINE

## 2020-05-13 PROCEDURE — 99214 PR OFFICE/OUTPT VISIT, EST, LEVL IV, 30-39 MIN: ICD-10-PCS | Mod: S$GLB,,, | Performed by: INTERNAL MEDICINE

## 2020-05-13 PROCEDURE — 99214 OFFICE O/P EST MOD 30 MIN: CPT | Mod: S$GLB,,, | Performed by: INTERNAL MEDICINE

## 2020-05-13 PROCEDURE — 3078F PR MOST RECENT DIASTOLIC BLOOD PRESSURE < 80 MM HG: ICD-10-PCS | Mod: CPTII,S$GLB,, | Performed by: INTERNAL MEDICINE

## 2020-05-13 PROCEDURE — 3078F DIAST BP <80 MM HG: CPT | Mod: CPTII,S$GLB,, | Performed by: INTERNAL MEDICINE

## 2020-05-13 PROCEDURE — 3008F PR BODY MASS INDEX (BMI) DOCUMENTED: ICD-10-PCS | Mod: CPTII,S$GLB,, | Performed by: INTERNAL MEDICINE

## 2020-05-13 PROCEDURE — 3008F BODY MASS INDEX DOCD: CPT | Mod: CPTII,S$GLB,, | Performed by: INTERNAL MEDICINE

## 2020-05-13 PROCEDURE — 99999 PR PBB SHADOW E&M-EST. PATIENT-LVL IV: CPT | Mod: PBBFAC,,, | Performed by: INTERNAL MEDICINE

## 2020-05-13 PROCEDURE — 3077F SYST BP >= 140 MM HG: CPT | Mod: CPTII,S$GLB,, | Performed by: INTERNAL MEDICINE

## 2020-05-13 NOTE — PROGRESS NOTES
Subjective:    Patient ID:  Sindhu Diaz is a 49 y.o. female who presents for follow-up of Chest Pain      HPI     CP, asthma, palpitations     4/11/17 [facility] - EDP 9, EF 60%, normal coronaries     Echo 1/17/18    1 - Normal left ventricular systolic function (EF 55-60%).     2 - Normal right ventricular systolic function .     3 - Normal left ventricular diastolic function.     4 - No wall motion abnormalities.     5.  Normal valvular function     Stress test 4/3/17  LVEF: 67 %  Impression: ABNORMAL MYOCARDIAL PERFUSION  1. There is evidence for moderate myocardial ischemia in the anterior wall of the left ventricle.   2. The perfusion scan is free of evidence for myocardial injury.   3. Resting wall motion is physiologic.   4. Resting LV function is normal.      Holter 1/17/18 - average HR 76, Few PVCs and PACS, no SVT or VT     8/22/18 Still gets occasional palpitations  Denies CP or SOB  EKG NSR - ok     9/5/19 Denies CP or SOB  EKG NSR - ok  Having some pains in her left lower back with numbness down left leg  Echo from 2016 showed mild AS - she has no cardiac murmur and no AS was seen on echo 1/17/18 - doubt she actually has AS  Cardiac stable  OV 1 year     Went to the ER 3/10/20  49-year-old complains of intermittent pain to her anterior chest for the last 2 days.  Patient was seen here 3 days ago secondary to a fall.  Patient injured her suprapubic area after she fell on to a trailer hitch.  At that ER visit patient had no chest pain or shortness of breath.  It was the next day when her pain began.  She does feel occasional short of breath and feels like she cannot take a deep breath in.  She denies fever.  She does have an occasional cough.  She still notes swelling to her suprapubic area and has been taking hydrocodone for the pain.  Her appetite has been normal.  No fever.    EKG 3/10/20 NSR PVCs NSSTT changes    5/13/20 Still with exertional chest tightness and SOB    Review of Systems   Constitution:  Negative for decreased appetite.   HENT: Negative for ear discharge.    Eyes: Negative for blurred vision.   Respiratory: Negative for hemoptysis.    Endocrine: Negative for polyphagia.   Hematologic/Lymphatic: Negative for adenopathy.   Skin: Negative for color change.   Musculoskeletal: Negative for joint swelling.   Genitourinary: Negative for bladder incontinence.   Neurological: Negative for brief paralysis.   Psychiatric/Behavioral: Negative for hallucinations.   Allergic/Immunologic: Negative for hives.        Objective:    Physical Exam   Constitutional: She is oriented to person, place, and time. She appears well-developed and well-nourished.   HENT:   Head: Normocephalic and atraumatic.   Eyes: Pupils are equal, round, and reactive to light. Conjunctivae are normal.   Neck: Normal range of motion. Neck supple.   Cardiovascular: Normal rate, normal heart sounds and intact distal pulses.   Pulmonary/Chest: Effort normal and breath sounds normal.   Abdominal: Soft. Bowel sounds are normal.   Musculoskeletal: Normal range of motion.   Neurological: She is alert and oriented to person, place, and time.   Skin: Skin is warm and dry.         Assessment:       1. Palpitations    2. Pure hypercholesterolemia    3. Chest pain, atypical         Plan:       Stress echo for CP and SOB

## 2020-05-20 ENCOUNTER — HOSPITAL ENCOUNTER (OUTPATIENT)
Dept: CARDIOLOGY | Facility: HOSPITAL | Age: 50
Discharge: HOME | End: 2020-05-20
Attending: INTERNAL MEDICINE
Payer: MEDICARE

## 2020-05-20 DIAGNOSIS — R00.2 PALPITATIONS: ICD-10-CM

## 2020-05-20 DIAGNOSIS — E78.00 PURE HYPERCHOLESTEROLEMIA: ICD-10-CM

## 2020-05-20 DIAGNOSIS — R07.89 CHEST PAIN, ATYPICAL: ICD-10-CM

## 2020-05-20 LAB
AORTIC ROOT ANNULUS: 2.85 CM
AORTIC VALVE CUSP SEPERATION: 2.26 CM
ASCENDING AORTA: 2.64 CM
AV INDEX (PROSTH): 0.83
AV MEAN GRADIENT: 4 MMHG
AV PEAK GRADIENT: 7 MMHG
AV VALVE AREA: 3.52 CM2
AV VELOCITY RATIO: 0.75
CV ECHO LV RWT: 0.42 CM
CV STRESS BASE HR: 69 BPM
DIASTOLIC BLOOD PRESSURE: 73 MMHG
DOP CALC AO PEAK VEL: 1.3 M/S
DOP CALC AO VTI: 26.65 CM
DOP CALC LVOT AREA: 4.2 CM2
DOP CALC LVOT DIAMETER: 2.32 CM
DOP CALC LVOT PEAK VEL: 0.98 M/S
DOP CALC LVOT STROKE VOLUME: 93.93 CM3
DOP CALCLVOT PEAK VEL VTI: 22.23 CM
E WAVE DECELERATION TIME: 281.76 MSEC
E/A RATIO: 1.18
E/E' RATIO: 11.68 M/S
ECHO LV POSTERIOR WALL: 1.01 CM (ref 0.6–1.1)
FRACTIONAL SHORTENING: 44 % (ref 28–44)
INTERVENTRICULAR SEPTUM: 0.91 CM (ref 0.6–1.1)
IVRT: 99.19 MSEC
LA MAJOR: 4.97 CM
LA MINOR: 4.82 CM
LA WIDTH: 3.58 CM
LEFT ATRIUM SIZE: 3.96 CM
LEFT ATRIUM VOLUME: 58.97 CM3
LEFT INTERNAL DIMENSION IN SYSTOLE: 2.7 CM (ref 2.1–4)
LEFT VENTRICLE DIASTOLIC VOLUME: 107.23 ML
LEFT VENTRICLE SYSTOLIC VOLUME: 27 ML
LEFT VENTRICULAR INTERNAL DIMENSION IN DIASTOLE: 4.79 CM (ref 3.5–6)
LEFT VENTRICULAR MASS: 160.52 G
LV LATERAL E/E' RATIO: 13.88 M/S
LV SEPTAL E/E' RATIO: 10.09 M/S
MV PEAK A VEL: 0.94 M/S
MV PEAK E VEL: 1.11 M/S
OHS CV CPX 1 MINUTE RECOVERY HEART RATE: 86 BPM
OHS CV CPX 85 PERCENT MAX PREDICTED HEART RATE MALE: 137
OHS CV CPX ESTIMATED METS: 6
OHS CV CPX MAX PREDICTED HEART RATE: 161
OHS CV CPX PATIENT IS FEMALE: 1
OHS CV CPX PATIENT IS MALE: 0
OHS CV CPX PEAK DIASTOLIC BLOOD PRESSURE: 79 MMHG
OHS CV CPX PEAK HEAR RATE: 150 BPM
OHS CV CPX PEAK RATE PRESSURE PRODUCT: NORMAL
OHS CV CPX PEAK SYSTOLIC BLOOD PRESSURE: 193 MMHG
OHS CV CPX PERCENT MAX PREDICTED HEART RATE ACHIEVED: 93
OHS CV CPX RATE PRESSURE PRODUCT PRESENTING: 8142
PISA TR MAX VEL: 2.69 M/S
PULM VEIN S/D RATIO: 1.05
PV PEAK D VEL: 0.64 M/S
PV PEAK S VEL: 0.67 M/S
PV PEAK VELOCITY: 1.04 CM/S
RA MAJOR: 5.09 CM
RA PRESSURE: 3 MMHG
RA WIDTH: 2.68 CM
RIGHT VENTRICULAR END-DIASTOLIC DIMENSION: 3.35 CM
RV TISSUE DOPPLER FREE WALL SYSTOLIC VELOCITY 1 (APICAL 4 CHAMBER VIEW): 16.1 CM/S
SINUS: 2.78 CM
STJ: 2.29 CM
STRESS ANGINA INDEX: 0
STRESS ECHO POST EXERCISE DUR MIN: 10 MINUTES
STRESS ECHO POST EXERCISE DUR SEC: 16 SECONDS
SYSTOLIC BLOOD PRESSURE: 118 MMHG
TDI LATERAL: 0.08 M/S
TDI SEPTAL: 0.11 M/S
TDI: 0.1 M/S
TR MAX PG: 29 MMHG
TRICUSPID ANNULAR PLANE SYSTOLIC EXCURSION: 2.29 CM
TV REST PULMONARY ARTERY PRESSURE: 32 MMHG

## 2020-05-20 PROCEDURE — 93320 STRESS ECHO (CUPID ONLY): ICD-10-PCS | Mod: 26,,, | Performed by: INTERNAL MEDICINE

## 2020-05-20 PROCEDURE — 93351 STRESS ECHO (CUPID ONLY): ICD-10-PCS | Mod: 26,,, | Performed by: INTERNAL MEDICINE

## 2020-05-20 PROCEDURE — 93351 STRESS TTE COMPLETE: CPT | Mod: 26,,, | Performed by: INTERNAL MEDICINE

## 2020-05-20 PROCEDURE — 93325 DOPPLER ECHO COLOR FLOW MAPG: CPT | Mod: 26,,, | Performed by: INTERNAL MEDICINE

## 2020-05-20 PROCEDURE — 93325 DOPPLER ECHO COLOR FLOW MAPG: CPT

## 2020-05-20 PROCEDURE — 93325 STRESS ECHO (CUPID ONLY): ICD-10-PCS | Mod: 26,,, | Performed by: INTERNAL MEDICINE

## 2020-05-20 PROCEDURE — 93320 DOPPLER ECHO COMPLETE: CPT | Mod: 26,,, | Performed by: INTERNAL MEDICINE

## 2020-05-27 ENCOUNTER — OFFICE VISIT (OUTPATIENT)
Dept: CARDIOLOGY | Facility: CLINIC | Age: 50
End: 2020-05-27
Payer: MEDICARE

## 2020-05-27 VITALS
BODY MASS INDEX: 30.86 KG/M2 | HEIGHT: 71 IN | DIASTOLIC BLOOD PRESSURE: 62 MMHG | WEIGHT: 220.44 LBS | HEART RATE: 62 BPM | OXYGEN SATURATION: 98 % | SYSTOLIC BLOOD PRESSURE: 136 MMHG

## 2020-05-27 DIAGNOSIS — E78.00 PURE HYPERCHOLESTEROLEMIA: ICD-10-CM

## 2020-05-27 DIAGNOSIS — R00.2 PALPITATIONS: ICD-10-CM

## 2020-05-27 DIAGNOSIS — G47.33 OSA (OBSTRUCTIVE SLEEP APNEA): Primary | ICD-10-CM

## 2020-05-27 DIAGNOSIS — R07.89 CHEST PAIN, ATYPICAL: ICD-10-CM

## 2020-05-27 PROCEDURE — 99999 PR PBB SHADOW E&M-EST. PATIENT-LVL III: ICD-10-PCS | Mod: PBBFAC,,, | Performed by: INTERNAL MEDICINE

## 2020-05-27 PROCEDURE — 3008F BODY MASS INDEX DOCD: CPT | Mod: CPTII,S$GLB,, | Performed by: INTERNAL MEDICINE

## 2020-05-27 PROCEDURE — 3078F DIAST BP <80 MM HG: CPT | Mod: CPTII,S$GLB,, | Performed by: INTERNAL MEDICINE

## 2020-05-27 PROCEDURE — 99999 PR PBB SHADOW E&M-EST. PATIENT-LVL III: CPT | Mod: PBBFAC,,, | Performed by: INTERNAL MEDICINE

## 2020-05-27 PROCEDURE — 99213 PR OFFICE/OUTPT VISIT, EST, LEVL III, 20-29 MIN: ICD-10-PCS | Mod: S$GLB,,, | Performed by: INTERNAL MEDICINE

## 2020-05-27 PROCEDURE — 3075F PR MOST RECENT SYSTOLIC BLOOD PRESS GE 130-139MM HG: ICD-10-PCS | Mod: CPTII,S$GLB,, | Performed by: INTERNAL MEDICINE

## 2020-05-27 PROCEDURE — 3008F PR BODY MASS INDEX (BMI) DOCUMENTED: ICD-10-PCS | Mod: CPTII,S$GLB,, | Performed by: INTERNAL MEDICINE

## 2020-05-27 PROCEDURE — 99213 OFFICE O/P EST LOW 20 MIN: CPT | Mod: S$GLB,,, | Performed by: INTERNAL MEDICINE

## 2020-05-27 PROCEDURE — 3075F SYST BP GE 130 - 139MM HG: CPT | Mod: CPTII,S$GLB,, | Performed by: INTERNAL MEDICINE

## 2020-05-27 PROCEDURE — 3078F PR MOST RECENT DIASTOLIC BLOOD PRESSURE < 80 MM HG: ICD-10-PCS | Mod: CPTII,S$GLB,, | Performed by: INTERNAL MEDICINE

## 2020-05-27 NOTE — PROGRESS NOTES
Subjective:    Patient ID:  Sindhu Diaz is a 49 y.o. female who presents for follow-up of Results      HPI     CP, asthma, palpitations     4/11/17 [facility] - EDP 9, EF 60%, normal coronaries    Stress echo 5/20/20  · Normal left ventricular systolic function. The estimated ejection fraction is 55%.  · Normal LV diastolic function.  · Normal right ventricular systolic function.  · Mild tricuspid regurgitation.  · The estimated PA systolic pressure is 32 mmHg.  · The ECG portion of this study is negative for myocardial ischemia.  · The stress echo portion of this study is negative for myocardial ischemia.     Echo 1/17/18    1 - Normal left ventricular systolic function (EF 55-60%).     2 - Normal right ventricular systolic function .     3 - Normal left ventricular diastolic function.     4 - No wall motion abnormalities.     5.  Normal valvular function     Stress test 4/3/17  LVEF: 67 %  Impression: ABNORMAL MYOCARDIAL PERFUSION  1. There is evidence for moderate myocardial ischemia in the anterior wall of the left ventricle.   2. The perfusion scan is free of evidence for myocardial injury.   3. Resting wall motion is physiologic.   4. Resting LV function is normal.      Holter 1/17/18 - average HR 76, Few PVCs and PACS, no SVT or VT     8/22/18 Still gets occasional palpitations  Denies CP or SOB  EKG NSR - ok     9/5/19 Denies CP or SOB  EKG NSR - ok  Having some pains in her left lower back with numbness down left leg  Echo from 2016 showed mild AS - she has no cardiac murmur and no AS was seen on echo 1/17/18 - doubt she actually has AS  Cardiac stable  OV 1 year     Went to the ER 3/10/20  49-year-old complains of intermittent pain to her anterior chest for the last 2 days.  Patient was seen here 3 days ago secondary to a fall.  Patient injured her suprapubic area after she fell on to a trailer hitch.  At that ER visit patient had no chest pain or shortness of breath.  It was the next day when her pain began.   She does feel occasional short of breath and feels like she cannot take a deep breath in.  She denies fever.  She does have an occasional cough.  She still notes swelling to her suprapubic area and has been taking hydrocodone for the pain.  Her appetite has been normal.  No fever.     EKG 3/10/20 NSR PVCs NSSTT changes     5/13/20 Still with exertional chest tightness and SOB   Stress echo for CP and SOB    5/27/20 CP improved - only with heavy lifting    Review of Systems   Constitution: Negative for decreased appetite.   HENT: Negative for ear discharge.    Eyes: Negative for blurred vision.   Respiratory: Negative for hemoptysis.    Endocrine: Negative for polyphagia.   Hematologic/Lymphatic: Negative for adenopathy.   Skin: Negative for color change.   Musculoskeletal: Negative for joint swelling.   Genitourinary: Negative for bladder incontinence.   Neurological: Negative for brief paralysis.   Psychiatric/Behavioral: Negative for hallucinations.   Allergic/Immunologic: Negative for hives.        Objective:    Physical Exam   Constitutional: She is oriented to person, place, and time. She appears well-developed and well-nourished.   HENT:   Head: Normocephalic and atraumatic.   Eyes: Pupils are equal, round, and reactive to light. Conjunctivae are normal.   Neck: Normal range of motion. Neck supple.   Cardiovascular: Normal rate, normal heart sounds and intact distal pulses.   Pulmonary/Chest: Effort normal and breath sounds normal.   Abdominal: Soft. Bowel sounds are normal.   Musculoskeletal: Normal range of motion.   Neurological: She is alert and oriented to person, place, and time.   Skin: Skin is warm and dry.         Assessment:       1. AMARA (obstructive sleep apnea)    2. Chest pain, atypical    3. Pure hypercholesterolemia    4. Palpitations         Plan:       Cardiac stable  OV 6 months

## 2020-07-27 ENCOUNTER — OFFICE VISIT (OUTPATIENT)
Dept: ORTHOPEDICS | Facility: CLINIC | Age: 50
End: 2020-07-27
Payer: MEDICARE

## 2020-07-27 ENCOUNTER — HOSPITAL ENCOUNTER (OUTPATIENT)
Dept: RADIOLOGY | Facility: HOSPITAL | Age: 50
Discharge: HOME | End: 2020-07-27
Attending: PHYSICIAN ASSISTANT
Payer: MEDICARE

## 2020-07-27 VITALS
BODY MASS INDEX: 29.54 KG/M2 | HEIGHT: 71 IN | SYSTOLIC BLOOD PRESSURE: 146 MMHG | RESPIRATION RATE: 18 BRPM | HEART RATE: 71 BPM | DIASTOLIC BLOOD PRESSURE: 81 MMHG | WEIGHT: 211 LBS

## 2020-07-27 DIAGNOSIS — M25.561 PAIN IN BOTH KNEES, UNSPECIFIED CHRONICITY: ICD-10-CM

## 2020-07-27 DIAGNOSIS — M25.562 LEFT KNEE PAIN, UNSPECIFIED CHRONICITY: Primary | ICD-10-CM

## 2020-07-27 DIAGNOSIS — M25.562 PAIN IN BOTH KNEES, UNSPECIFIED CHRONICITY: ICD-10-CM

## 2020-07-27 DIAGNOSIS — M23.52 CHRONIC INSTABILITY OF LEFT KNEE: ICD-10-CM

## 2020-07-27 PROCEDURE — 3077F SYST BP >= 140 MM HG: CPT | Mod: CPTII,S$GLB,, | Performed by: PHYSICIAN ASSISTANT

## 2020-07-27 PROCEDURE — 3077F PR MOST RECENT SYSTOLIC BLOOD PRESSURE >= 140 MM HG: ICD-10-PCS | Mod: CPTII,S$GLB,, | Performed by: PHYSICIAN ASSISTANT

## 2020-07-27 PROCEDURE — 99213 OFFICE O/P EST LOW 20 MIN: CPT | Mod: S$GLB,,, | Performed by: PHYSICIAN ASSISTANT

## 2020-07-27 PROCEDURE — 99999 PR PBB SHADOW E&M-EST. PATIENT-LVL IV: ICD-10-PCS | Mod: PBBFAC,,, | Performed by: PHYSICIAN ASSISTANT

## 2020-07-27 PROCEDURE — 3008F PR BODY MASS INDEX (BMI) DOCUMENTED: ICD-10-PCS | Mod: CPTII,S$GLB,, | Performed by: PHYSICIAN ASSISTANT

## 2020-07-27 PROCEDURE — 99213 PR OFFICE/OUTPT VISIT, EST, LEVL III, 20-29 MIN: ICD-10-PCS | Mod: S$GLB,,, | Performed by: PHYSICIAN ASSISTANT

## 2020-07-27 PROCEDURE — 73564 X-RAY EXAM KNEE 4 OR MORE: CPT | Mod: TC,50

## 2020-07-27 PROCEDURE — 3008F BODY MASS INDEX DOCD: CPT | Mod: CPTII,S$GLB,, | Performed by: PHYSICIAN ASSISTANT

## 2020-07-27 PROCEDURE — 3079F PR MOST RECENT DIASTOLIC BLOOD PRESSURE 80-89 MM HG: ICD-10-PCS | Mod: CPTII,S$GLB,, | Performed by: PHYSICIAN ASSISTANT

## 2020-07-27 PROCEDURE — 3079F DIAST BP 80-89 MM HG: CPT | Mod: CPTII,S$GLB,, | Performed by: PHYSICIAN ASSISTANT

## 2020-07-27 PROCEDURE — 99999 PR PBB SHADOW E&M-EST. PATIENT-LVL IV: CPT | Mod: PBBFAC,,, | Performed by: PHYSICIAN ASSISTANT

## 2020-07-27 PROCEDURE — 73564 X-RAY EXAM KNEE 4 OR MORE: CPT | Mod: 26,50,, | Performed by: RADIOLOGY

## 2020-07-27 PROCEDURE — 73564 XR KNEE ORTHO BILAT WITH FLEXION: ICD-10-PCS | Mod: 26,50,, | Performed by: RADIOLOGY

## 2020-07-27 NOTE — PROGRESS NOTES
Subjective:      Patient ID: Sindhu Diaz is a 50 y.o. female.    Chief Complaint: Pain of the Left Knee    HPI  50 year old female presents with chief complaint of left knee pain x 3 weeks. Her [employer] gleason ran his head into the lateral side of her knee and her knee buckled but she grabbed something to keep her from falling. Pain is at the lateral and posterior knee. It is worse with walking and standing. She has taken tylenol, tramadol, and norco with short relief. She reports swelling, popping, and giving way. She denies locking and catching. She does not use assistive devices.   Review of Systems   Constitution: Negative for chills, fever and night sweats.   Cardiovascular: Negative for chest pain.   Respiratory: Negative for cough and shortness of breath.    Hematologic/Lymphatic: Does not bruise/bleed easily.   Skin: Negative for color change.   Gastrointestinal: Negative for heartburn.   Genitourinary: Negative for dysuria.   Neurological: Negative for numbness and paresthesias.   Psychiatric/Behavioral: Negative for altered mental status.   Allergic/Immunologic: Negative for persistent infections.         Objective:            Ortho/SPM Exam  General :   alert, appears stated age and cooperative   Gait: Antalgic. The patient can bear weight on the injured extremity.   Left Lower Extremity  Hip Palpation:  no tenderness over the greater  trochanter   Hip ROM: 100% of normal    Knee Effusion:  None.   Ecchymosis:  none   Knee ROM:  5 to 90 degrees without subpatellar   crepitance.   Patella:  Patella does track normally.  Patellar apprehension test: negative  Patellar compression test: positive   Tenderness: lateral joint line and lateral facet of the patella   Stability:  Lachman's test: negative  Posterior drawer: negative  Medial collateral ligament: negative  Lateral collateral ligament: negative         Steven's Test:  positive    Sensation:   intact to light touch   Pulses: normal DP and PT pulses          X-ray: ordered and reviewed by myself. No acute fracture or dislocation of the knees.  Minimal degenerative changes as above.        Assessment:       Encounter Diagnoses   Name Primary?    Left knee pain, unspecified chronicity Yes    Chronic instability of left knee           Plan:       MRI was ordered to r/o meniscus tear. RTC pending results.

## 2020-08-07 ENCOUNTER — HOSPITAL ENCOUNTER (OUTPATIENT)
Dept: RADIOLOGY | Facility: HOSPITAL | Age: 50
Discharge: HOME | End: 2020-08-07
Attending: PHYSICIAN ASSISTANT
Payer: MEDICARE

## 2020-08-07 DIAGNOSIS — M23.52 CHRONIC INSTABILITY OF LEFT KNEE: ICD-10-CM

## 2020-08-07 DIAGNOSIS — M25.562 LEFT KNEE PAIN, UNSPECIFIED CHRONICITY: ICD-10-CM

## 2020-08-07 PROCEDURE — 73721 MRI JNT OF LWR EXTRE W/O DYE: CPT | Mod: TC,LT

## 2020-08-07 PROCEDURE — 73721 MRI JNT OF LWR EXTRE W/O DYE: CPT | Mod: 26,LT,, | Performed by: RADIOLOGY

## 2020-08-07 PROCEDURE — 73721 MRI KNEE WITHOUT CONTRAST LEFT: ICD-10-PCS | Mod: 26,LT,, | Performed by: RADIOLOGY

## 2020-08-10 ENCOUNTER — TELEPHONE (OUTPATIENT)
Dept: ORTHOPEDICS | Facility: CLINIC | Age: 50
End: 2020-08-10

## 2020-08-10 NOTE — TELEPHONE ENCOUNTER
Spoke to patient regarding MRI results. Discussed injection and PT. Patient wants to speak to a surgeon about repairing her meniscus tear. Told patient that a knee scope is not likely to be recommended at this time. However, she still wants to discuss this with a surgeon. Consult was scheduled with Dr. Hill.

## 2020-08-11 ENCOUNTER — OFFICE VISIT (OUTPATIENT)
Dept: SPORTS MEDICINE | Facility: CLINIC | Age: 50
End: 2020-08-11
Payer: MEDICARE

## 2020-08-11 VITALS
SYSTOLIC BLOOD PRESSURE: 136 MMHG | HEIGHT: 71 IN | BODY MASS INDEX: 29.54 KG/M2 | HEART RATE: 86 BPM | DIASTOLIC BLOOD PRESSURE: 70 MMHG | WEIGHT: 211 LBS

## 2020-08-11 DIAGNOSIS — M17.12 PRIMARY OSTEOARTHRITIS OF LEFT KNEE: Primary | ICD-10-CM

## 2020-08-11 DIAGNOSIS — S80.02XA CONTUSION OF LEFT KNEE, INITIAL ENCOUNTER: ICD-10-CM

## 2020-08-11 PROCEDURE — 20610 DRAIN/INJ JOINT/BURSA W/O US: CPT | Mod: LT,S$GLB,, | Performed by: ORTHOPAEDIC SURGERY

## 2020-08-11 PROCEDURE — 3008F PR BODY MASS INDEX (BMI) DOCUMENTED: ICD-10-PCS | Mod: CPTII,S$GLB,, | Performed by: ORTHOPAEDIC SURGERY

## 2020-08-11 PROCEDURE — 3078F PR MOST RECENT DIASTOLIC BLOOD PRESSURE < 80 MM HG: ICD-10-PCS | Mod: CPTII,S$GLB,, | Performed by: ORTHOPAEDIC SURGERY

## 2020-08-11 PROCEDURE — 99204 OFFICE O/P NEW MOD 45 MIN: CPT | Mod: 25,S$GLB,, | Performed by: ORTHOPAEDIC SURGERY

## 2020-08-11 PROCEDURE — 99999 PR PBB SHADOW E&M-EST. PATIENT-LVL IV: CPT | Mod: PBBFAC,,, | Performed by: ORTHOPAEDIC SURGERY

## 2020-08-11 PROCEDURE — 99999 PR PBB SHADOW E&M-EST. PATIENT-LVL IV: ICD-10-PCS | Mod: PBBFAC,,, | Performed by: ORTHOPAEDIC SURGERY

## 2020-08-11 PROCEDURE — 99204 PR OFFICE/OUTPT VISIT, NEW, LEVL IV, 45-59 MIN: ICD-10-PCS | Mod: 25,S$GLB,, | Performed by: ORTHOPAEDIC SURGERY

## 2020-08-11 PROCEDURE — 20610 LARGE JOINT ASPIRATION/INJECTION: L KNEE: ICD-10-PCS | Mod: LT,S$GLB,, | Performed by: ORTHOPAEDIC SURGERY

## 2020-08-11 PROCEDURE — 3078F DIAST BP <80 MM HG: CPT | Mod: CPTII,S$GLB,, | Performed by: ORTHOPAEDIC SURGERY

## 2020-08-11 PROCEDURE — 3075F PR MOST RECENT SYSTOLIC BLOOD PRESS GE 130-139MM HG: ICD-10-PCS | Mod: CPTII,S$GLB,, | Performed by: ORTHOPAEDIC SURGERY

## 2020-08-11 PROCEDURE — 3008F BODY MASS INDEX DOCD: CPT | Mod: CPTII,S$GLB,, | Performed by: ORTHOPAEDIC SURGERY

## 2020-08-11 PROCEDURE — 3075F SYST BP GE 130 - 139MM HG: CPT | Mod: CPTII,S$GLB,, | Performed by: ORTHOPAEDIC SURGERY

## 2020-08-11 RX ADMIN — TRIAMCINOLONE ACETONIDE 40 MG: 40 INJECTION, SUSPENSION INTRA-ARTICULAR; INTRAMUSCULAR at 03:08

## 2020-08-11 NOTE — PROGRESS NOTES
CC: Left knee pain    50 y.o. Female who presents as a new patient to me. She works an  for her 's [employer]. Complaint is left knee pain x 5 months with sudden onset that occurred when she hit her knee on the towbar of her mobile home. Following the initial injury, she was seen at the ER. Recently she saw Pauline Hansen who ordered Xrays and an MRI. Pain localizes over the lateral joint line which can also be felt posteriorly.  She reports associated subjective swelling as well. No prominent mechanical symptoms. Reports having subjective instability.  Worse with stairs and kneeling. Better with rest. Treatment thus far has included rest, activity modifications, oral medications and bracing. Here today to discuss diagnosis and treatment options.      PMHx notable for prior right shoulder surgery  Negative for tobacco.   Negative for diabetes.     REVIEW OF SYSTEMS:   Constitution: Negative. Negative for chills, fever and night sweats.    Hematologic/Lymphatic: Negative for bleeding problem. Does not bruise/bleed easily.   Skin: Negative for dry skin, itching and rash.   Musculoskeletal: Negative for falls. Positive for left knee pain and muscle weakness.     All other review of symptoms were reviewed and found to be noncontributory.     PAST MEDICAL HISTORY:   Past Medical History:   Diagnosis Date    Asthma     GERD (gastroesophageal reflux disease)     Hyperlipidemia     IBS (irritable bowel syndrome)     Sleep apnea     Tension headache, chronic     Vaginal delivery     x3     PAST SURGICAL HISTORY:   Past Surgical History:   Procedure Laterality Date    BUNIONECTOMY Bilateral     CHOLECYSTECTOMY      COLONOSCOPY N/A 8/17/2018    Procedure: COLONOSCOPY;  Surgeon: Deb Bolivar MD;  Location[facility];  Service: Endoscopy;  Laterality: N/A;    HYSTERECTOMY       FAMILY HISTORY:   Family History   Problem Relation Age of Onset    Heart attack Father          at 70     Lymphoma Mother     Cataracts Mother     Heart disease Sister     No Known Problems Brother     No Known Problems Maternal Aunt     No Known Problems Maternal Uncle     No Known Problems Paternal Aunt     No Known Problems Paternal Uncle     No Known Problems Maternal Grandmother     No Known Problems Maternal Grandfather     No Known Problems Paternal Grandmother     No Known Problems Paternal Grandfather     Breast cancer Neg Hx     Colon cancer Neg Hx     Ovarian cancer Neg Hx     Amblyopia Neg Hx     Blindness Neg Hx     Cancer Neg Hx     Diabetes Neg Hx     Glaucoma Neg Hx     Hypertension Neg Hx     Macular degeneration Neg Hx     Retinal detachment Neg Hx     Strabismus Neg Hx     Stroke Neg Hx     Thyroid disease Neg Hx      SOCIAL HISTORY:   Social History     Socioeconomic History    Marital status:      Spouse name: Not on file    Number of children: Not on file    Years of education: Not on file    Highest education level: Not on file   Occupational History    Not on file   Social Needs    Financial resource strain: Not on file    Food insecurity     Worry: Not on file     Inability: Not on file    Transportation needs     Medical: Not on file     Non-medical: Not on file   Tobacco Use    Smoking status: Former Smoker     Years: 15.00     Types: Cigarettes     Quit date:      Years since quittin.6    Smokeless tobacco: Never Used    Tobacco comment: quit when she was 33 yrs old.   Substance and Sexual Activity    Alcohol use: No     Comment: rarely    Drug use: No    Sexual activity: Yes     Partners: Male     Birth control/protection: Surgical   Lifestyle    Physical activity     Days per week: Not on file     Minutes per session: Not on file    Stress: Not on file   Relationships    Social connections     Talks on phone: Not on file     Gets together: Not on file     Attends Latter day service: Not on file     Active member of club or  organization: Not on file     Attends meetings of clubs or organizations: Not on file     Relationship status: Not on file   Other Topics Concern    Not on file   Social History Narrative    Not on file     MEDICATIONS:     Current Outpatient Medications:     albuterol sulfate 2.5 mg/0.5 mL Nebu, Take 2.5 mg by nebulization every 4 (four) hours as needed. Rescue, Disp: 30 each, Rfl: 0    aspirin 81 MG Chew, Take 1 tablet (81 mg total) by mouth once daily., Disp: , Rfl: 0    gabapentin (NEURONTIN) 300 MG capsule, Take 1 capsule (300 mg total) by mouth 3 (three) times daily., Disp: 90 capsule, Rfl: 3    hydrocodone-acetaminophen 10-325mg (NORCO)  mg Tab, , Disp: , Rfl: 0    imipramine (TOFRANIL) 25 MG tablet, Take 1 tablet (25 mg total) by mouth every evening., Disp: 30 tablet, Rfl: 11    LINZESS 290 mcg Cap, TK ONE C PO QD ON EMPTY STOMACH, Disp: , Rfl: 2    magnesium oxide (MAG-OX) 400 mg (241.3 mg magnesium) tablet, Take 1 tablet (400 mg total) by mouth once daily., Disp: 90 tablet, Rfl: 3    metoprolol succinate (TOPROL-XL) 25 MG 24 hr tablet, Take 1 tablet (25 mg total) by mouth once daily., Disp: 30 tablet, Rfl: 11    oxybutynin (DITROPAN XL) 15 MG TR24, TK 1 T PO BID, Disp: , Rfl: 0    PROAIR HFA 90 mcg/actuation inhaler, Inhale 1 puff into the lungs every 4 (four) hours as needed. , Disp: , Rfl: 0    simvastatin (ZOCOR) 40 MG tablet, , Disp: , Rfl: 5    sucralfate (CARAFATE) 1 gram tablet, TK 1 T PO QID AC AND HS, Disp: , Rfl: 3    SYMBICORT 160-4.5 mcg/actuation HFAA, Inhale 2 puffs into the lungs once daily. , Disp: , Rfl: 0    tramadol (ULTRAM) 50 mg tablet, , Disp: , Rfl: 1  No current facility-administered medications for this visit.     Facility-Administered Medications Ordered in Other Visits:     0.9%  NaCl infusion, , Intravenous, Continuous, Deb Bolivar MD, Last Rate: 20 mL/hr at 08/17/18 0904    sodium chloride 0.9% flush 3 mL, 3 mL, Intravenous, PRN, Deb FOX  "MD [place]    ALLERGIES:   Review of patient's allergies indicates:   Allergen Reactions    Lipitor [atorvastatin]     Statins-hmg-coa reductase inhibitors       PHYSICAL EXAMINATION:  /70   Pulse 86   Ht 5' 11" (1.803 m)   Wt 95.7 kg (211 lb)   LMP  (LMP Unknown)   BMI 29.43 kg/m²   General: Well-developed well-nourished 50 y.o. femalein no acute distress   Cardiovascular: Regular rhythm by palpation of distal pulse, normal color and temperature, no concerning varicosities on symptomatic side   Lungs: No labored breathing or wheezing appreciated   Neuro: Alert and oriented ×3   Psychiatric: well oriented to person, place and time, demonstrates normal mood and affect   Skin: No rashes, lesions or ulcers, normal temperature, turgor, and texture on involved extremity    Ortho/SPM Exam  Examination of the left knee demonstrates mild prepatellar soft tissue tenderness.  No prepatellar effusion present.  Trace effusion in the knee.  Intact extensor mechanism.  Prominent pain and tenderness over the lateral joint line.  Significant pain and guarding with Steven's testing.  Ligamentously stable.    IMAGING:  Prior X-rays including standing, weight bearing AP and flexion bilateral knees, BILATERAL knee lateral and sunrise views reviewed by me show:    Bilateral lateral joint space narrowing in mid flexion    MRI reviewed by me and discussed with patient. Study shows:    1. Complex tear body segment/anterior horn lateral meniscus with  suspected extension  into the anterior root ligament and a small multilobulated intra/parameniscal cyst.   2. Advanced lateral tibial plateau chondromalacia.  Mild patellar and medial tibiofemoral chondromalacia.   3. Moderate joint effusion.    On my read there is some subtle subchondral bone marrow edema seen in the lateral compartment indicative of advanced degenerative changes.  There also is an area of subcutaneous soft tissue swelling indicative of recent knee contusion " and trauma    ASSESSMENT:      ICD-10-CM ICD-9-CM   1. Primary osteoarthritis of left knee  M17.12 715.16   2. Contusion of left knee, initial encounter  S80.02XA 924.11       PLAN:     MRI results discussed with the patient.  Diagnosis is left knee osteoarthritis predominantly over the lateral compartment.  Worsened symptomatically due to her recent injury.  I do not think arthroscopic intervention would be of any significant, durable benefit.  Rather conservative care was recommended.  Discussed low-impact cardio exercise, weight loss, oral anti-inflammatory medication, and possible injection therapy.  Corticosteroid injection given.  Fitted for a lateral  brace.  All questions answered.  We will see her back as needed.  Candidate for viscosupplementation in the future.    Large Joint Aspiration/Injection: L knee    Date/Time: 8/11/2020 3:15 PM  Performed by: ADALBERTO Hill MD  Authorized by: ADALBERTO Hill MD     Consent Done?:  Yes (Verbal)  Indications:  Pain  Site marked: the procedure site was marked    Timeout: prior to procedure the correct patient, procedure, and site was verified    Prep: patient was prepped and draped in usual sterile fashion      Local anesthesia used?: Yes    Local anesthetic:  Co-phenylcaine spray (0.2% Naropin)  Anesthetic total (ml):  4      Details:  Needle Size:  22 G  Ultrasonic Guidance for needle placement?: No    Approach:  Lateral  Location:  Knee  Site:  L knee  Medications:  40 mg triamcinolone acetonide 40 mg/mL  Patient tolerance:  Patient tolerated the procedure well with no immediate complications

## 2020-08-11 NOTE — LETTER
August 16, 2020      Pauline Hansen PA-C  [address]  [facility] [address]           [facility]  [address]  [facility] [address]  Phone: [phone]          Patient: Sindhu Diaz   MR Number: [id number]   YOB: 1970   Date of Visit: 8/11/2020       Dear Pauline Hansen:    Thank you for referring Sindhu Diaz to me for evaluation. Attached you will find relevant portions of my assessment and plan of care.    If you have questions, please do not hesitate to call me. I look forward to following Sindhu Diaz along with you.    Sincerely,    ADALBERTO Hill MD    Enclosure  CC:  No Recipients    If you would like to receive this communication electronically, please contact [email or web address] or [phone] to request more information on [employer] Link access.    For providers and/or their staff who would like to refer a patient to [facility], please contact us through our one-stop-shop provider referral line, [facility] , at [phone].    If you feel you have received this communication in error or would no longer like to receive these types of communications, please e-mail [email or web address]

## 2020-08-14 ENCOUNTER — OFFICE VISIT (OUTPATIENT)
Dept: CARDIOLOGY | Facility: CLINIC | Age: 50
End: 2020-08-14
Payer: MEDICARE

## 2020-08-14 VITALS
WEIGHT: 216.06 LBS | DIASTOLIC BLOOD PRESSURE: 68 MMHG | SYSTOLIC BLOOD PRESSURE: 129 MMHG | OXYGEN SATURATION: 98 % | BODY MASS INDEX: 30.25 KG/M2 | HEIGHT: 71 IN | HEART RATE: 68 BPM

## 2020-08-14 DIAGNOSIS — I10 HYPERTENSION: ICD-10-CM

## 2020-08-14 DIAGNOSIS — R07.89 CHEST PAIN, ATYPICAL: ICD-10-CM

## 2020-08-14 DIAGNOSIS — E78.00 PURE HYPERCHOLESTEROLEMIA: ICD-10-CM

## 2020-08-14 DIAGNOSIS — R00.2 PALPITATIONS: Primary | ICD-10-CM

## 2020-08-14 PROCEDURE — 3008F PR BODY MASS INDEX (BMI) DOCUMENTED: ICD-10-PCS | Mod: CPTII,S$GLB,, | Performed by: INTERNAL MEDICINE

## 2020-08-14 PROCEDURE — 3074F SYST BP LT 130 MM HG: CPT | Mod: CPTII,S$GLB,, | Performed by: INTERNAL MEDICINE

## 2020-08-14 PROCEDURE — 99214 OFFICE O/P EST MOD 30 MIN: CPT | Mod: S$GLB,,, | Performed by: INTERNAL MEDICINE

## 2020-08-14 PROCEDURE — 3074F PR MOST RECENT SYSTOLIC BLOOD PRESSURE < 130 MM HG: ICD-10-PCS | Mod: CPTII,S$GLB,, | Performed by: INTERNAL MEDICINE

## 2020-08-14 PROCEDURE — 93000 ELECTROCARDIOGRAM COMPLETE: CPT | Mod: S$GLB,,, | Performed by: INTERNAL MEDICINE

## 2020-08-14 PROCEDURE — 3078F DIAST BP <80 MM HG: CPT | Mod: CPTII,S$GLB,, | Performed by: INTERNAL MEDICINE

## 2020-08-14 PROCEDURE — 3008F BODY MASS INDEX DOCD: CPT | Mod: CPTII,S$GLB,, | Performed by: INTERNAL MEDICINE

## 2020-08-14 PROCEDURE — 93000 EKG 12-LEAD: ICD-10-PCS | Mod: S$GLB,,, | Performed by: INTERNAL MEDICINE

## 2020-08-14 PROCEDURE — 99214 PR OFFICE/OUTPT VISIT, EST, LEVL IV, 30-39 MIN: ICD-10-PCS | Mod: S$GLB,,, | Performed by: INTERNAL MEDICINE

## 2020-08-14 PROCEDURE — 99999 PR PBB SHADOW E&M-EST. PATIENT-LVL IV: CPT | Mod: PBBFAC,,, | Performed by: INTERNAL MEDICINE

## 2020-08-14 PROCEDURE — 3078F PR MOST RECENT DIASTOLIC BLOOD PRESSURE < 80 MM HG: ICD-10-PCS | Mod: CPTII,S$GLB,, | Performed by: INTERNAL MEDICINE

## 2020-08-14 PROCEDURE — 99999 PR PBB SHADOW E&M-EST. PATIENT-LVL IV: ICD-10-PCS | Mod: PBBFAC,,, | Performed by: INTERNAL MEDICINE

## 2020-08-14 NOTE — PROGRESS NOTES
Subjective:    Patient ID:  Sindhu Diaz is a 50 y.o. female who presents for follow-up of Palpitations      HPI     CP, asthma, palpitations     4/11/17 [facility] - EDP 9, EF 60%, normal coronaries     Stress echo 5/20/20  · Normal left ventricular systolic function. The estimated ejection fraction is 55%.  · Normal LV diastolic function.  · Normal right ventricular systolic function.  · Mild tricuspid regurgitation.  · The estimated PA systolic pressure is 32 mmHg.  · The ECG portion of this study is negative for myocardial ischemia.  · The stress echo portion of this study is negative for myocardial ischemia.     Echo 1/17/18    1 - Normal left ventricular systolic function (EF 55-60%).     2 - Normal right ventricular systolic function .     3 - Normal left ventricular diastolic function.     4 - No wall motion abnormalities.     5.  Normal valvular function     Stress test 4/3/17  LVEF: 67 %  Impression: ABNORMAL MYOCARDIAL PERFUSION  1. There is evidence for moderate myocardial ischemia in the anterior wall of the left ventricle.   2. The perfusion scan is free of evidence for myocardial injury.   3. Resting wall motion is physiologic.   4. Resting LV function is normal.      Holter 1/17/18 - average HR 76, Few PVCs and PACS, no SVT or VT     8/22/18 Still gets occasional palpitations  Denies CP or SOB  EKG NSR - ok     9/5/19 Denies CP or SOB  EKG NSR - ok  Having some pains in her left lower back with numbness down left leg  Echo from 2016 showed mild AS - she has no cardiac murmur and no AS was seen on echo 1/17/18 - doubt she actually has AS  Cardiac stable  OV 1 year     Went to the ER 3/10/20  49-year-old complains of intermittent pain to her anterior chest for the last 2 days.  Patient was seen here 3 days ago secondary to a fall.  Patient injured her suprapubic area after she fell on to a trailer hitch.  At that ER visit patient had no chest pain or shortness of breath.  It was the next day when her pain  began.  She does feel occasional short of breath and feels like she cannot take a deep breath in.  She denies fever.  She does have an occasional cough.  She still notes swelling to her suprapubic area and has been taking hydrocodone for the pain.  Her appetite has been normal.  No fever.     EKG 3/10/20 NSR PVCs NSSTT changes     5/13/20 Still with exertional chest tightness and SOB   Stress echo for CP and SOB     5/27/20 CP improved - only with heavy lifting     8/14/20 Stable COLEMAN and atypical CP  Occasional palpitation  EKG NSR PVC - NSSTT changes - no change  BP well controlled    Review of Systems   Constitution: Negative for decreased appetite.   HENT: Negative for ear discharge.    Eyes: Negative for blurred vision.   Respiratory: Negative for hemoptysis.    Endocrine: Negative for polyphagia.   Hematologic/Lymphatic: Negative for adenopathy.   Skin: Negative for color change.   Musculoskeletal: Negative for joint swelling.   Genitourinary: Negative for bladder incontinence.   Neurological: Negative for brief paralysis.   Psychiatric/Behavioral: Negative for hallucinations.   Allergic/Immunologic: Negative for hives.        Objective:    Physical Exam   Constitutional: She is oriented to person, place, and time. She appears well-developed and well-nourished.   HENT:   Head: Normocephalic and atraumatic.   Eyes: Pupils are equal, round, and reactive to light. Conjunctivae are normal.   Neck: Normal range of motion. Neck supple.   Cardiovascular: Normal rate, normal heart sounds and intact distal pulses.   Pulmonary/Chest: Effort normal and breath sounds normal.   Abdominal: Soft. Bowel sounds are normal.   Musculoskeletal: Normal range of motion.   Neurological: She is alert and oriented to person, place, and time.   Skin: Skin is warm and dry.         Assessment:       1. Palpitations    2. Pure hypercholesterolemia    3. Chest pain, atypical         Plan:       Cardiac stable  OV 6 months

## 2020-08-16 RX ORDER — TRIAMCINOLONE ACETONIDE 40 MG/ML
40 INJECTION, SUSPENSION INTRA-ARTICULAR; INTRAMUSCULAR
Status: DISCONTINUED | OUTPATIENT
Start: 2020-08-11 | End: 2020-08-16 | Stop reason: HOSPADM

## 2020-08-24 RX ORDER — METOPROLOL SUCCINATE 25 MG/1
25 TABLET, EXTENDED RELEASE ORAL DAILY
Qty: 90 TABLET | Refills: 3 | Status: SHIPPED | OUTPATIENT
Start: 2020-08-24 | End: 2020-08-26 | Stop reason: SDUPTHER

## 2020-08-24 RX ORDER — NAPROXEN SODIUM 220 MG/1
81 TABLET, FILM COATED ORAL DAILY
Refills: 0 | Status: ON HOLD | COMMUNITY
Start: 2020-08-24 | End: 2021-02-12 | Stop reason: HOSPADM

## 2020-11-16 NOTE — PROGRESS NOTES
"CC: Left knee pain    50 y.o. Female returns today for follow-up of her left knee. Diagnosis of osteoarthritis aggravated by recent contusion injury.  She recently suffered a ground level fall on 9 days ago when she was going to the bathrom at home and her knee "gave out" on her causing her to fall. Complaint is pain and swelling since the injury was sustained. She reports having pain at night. Worse with, walking, standing, using the stairs and kneeling. Proceeded with CSI on 8/11/2020. She has been using lateral knee  brace.She had relief from these treatment interventions until her recent fall. Not taking any oral anti-inflammatory medication.      PMHx notable for prior right shoulder surgery.  Negative for tobacco.   Negative for diabetes.     REVIEW OF SYSTEMS:   Constitution: Negative. Negative for chills, fever and night sweats.    Hematologic/Lymphatic: Negative for bleeding problem. Does not bruise/bleed easily.   Skin: Negative for dry skin, itching and rash.   Musculoskeletal: Negative for falls. Positive for left knee pain and muscle weakness.     All other review of symptoms were reviewed and found to be noncontributory.     PAST MEDICAL HISTORY:   Past Medical History:   Diagnosis Date    Asthma     GERD (gastroesophageal reflux disease)     Hyperlipidemia     IBS (irritable bowel syndrome)     Sleep apnea     Tension headache, chronic     Vaginal delivery     x3     PAST SURGICAL HISTORY:   Past Surgical History:   Procedure Laterality Date    BUNIONECTOMY Bilateral     CHOLECYSTECTOMY      COLONOSCOPY N/A 8/17/2018    Procedure: COLONOSCOPY;  Surgeon: Deb Bolivar MD;  Location: [facility];  Service: Endoscopy;  Laterality: N/A;    HYSTERECTOMY       FAMILY HISTORY:   Family History   Problem Relation Age of Onset    Heart attack Father         at 70     Lymphoma Mother     Cataracts Mother     Heart disease Sister     No Known Problems Brother     No Known Problems " Maternal Aunt     No Known Problems Maternal Uncle     No Known Problems Paternal Aunt     No Known Problems Paternal Uncle     No Known Problems Maternal Grandmother     No Known Problems Maternal Grandfather     No Known Problems Paternal Grandmother     No Known Problems Paternal Grandfather     Breast cancer Neg Hx     Colon cancer Neg Hx     Ovarian cancer Neg Hx     Amblyopia Neg Hx     Blindness Neg Hx     Cancer Neg Hx     Diabetes Neg Hx     Glaucoma Neg Hx     Hypertension Neg Hx     Macular degeneration Neg Hx     Retinal detachment Neg Hx     Strabismus Neg Hx     Stroke Neg Hx     Thyroid disease Neg Hx      SOCIAL HISTORY:   Social History     Socioeconomic History    Marital status:      Spouse name: Not on file    Number of children: Not on file    Years of education: Not on file    Highest education level: Not on file   Occupational History    Not on file   Social Needs    Financial resource strain: Not on file    Food insecurity     Worry: Not on file     Inability: Not on file    Transportation needs     Medical: Not on file     Non-medical: Not on file   Tobacco Use    Smoking status: Former Smoker     Years: 15.00     Types: Cigarettes     Quit date:      Years since quittin.8    Smokeless tobacco: Never Used    Tobacco comment: quit when she was 33 yrs old.   Substance and Sexual Activity    Alcohol use: No     Comment: rarely    Drug use: No    Sexual activity: Yes     Partners: Male     Birth control/protection: Surgical   Lifestyle    Physical activity     Days per week: Not on file     Minutes per session: Not on file    Stress: Not on file   Relationships    Social connections     Talks on phone: Not on file     Gets together: Not on file     Attends Rastafarian service: Not on file     Active member of club or organization: Not on file     Attends meetings of clubs or organizations: Not on file     Relationship status: Not on file    Other Topics Concern    Not on file   Social History Narrative    Not on file     MEDICATIONS:     Current Outpatient Medications:     albuterol sulfate 2.5 mg/0.5 mL Nebu, Take 2.5 mg by nebulization every 4 (four) hours as needed. Rescue, Disp: 30 each, Rfl: 0    aspirin 81 MG Chew, Take 1 tablet (81 mg total) by mouth once daily., Disp: , Rfl: 0    gabapentin (NEURONTIN) 300 MG capsule, Take 1 capsule (300 mg total) by mouth 3 (three) times daily., Disp: 90 capsule, Rfl: 3    hydrocodone-acetaminophen 10-325mg (NORCO)  mg Tab, , Disp: , Rfl: 0    imipramine (TOFRANIL) 25 MG tablet, Take 1 tablet (25 mg total) by mouth every evening., Disp: 30 tablet, Rfl: 11    LINZESS 290 mcg Cap, TK ONE C PO QD ON EMPTY STOMACH, Disp: , Rfl: 2    magnesium oxide (MAG-OX) 400 mg (241.3 mg magnesium) tablet, Take 1 tablet (400 mg total) by mouth once daily., Disp: 90 tablet, Rfl: 3    metoprolol succinate (TOPROL-XL) 25 MG 24 hr tablet, Take 1 tablet (25 mg total) by mouth once daily., Disp: 90 tablet, Rfl: 3    oxybutynin (DITROPAN XL) 15 MG TR24, TK 1 T PO BID, Disp: , Rfl: 0    PROAIR HFA 90 mcg/actuation inhaler, Inhale 1 puff into the lungs every 4 (four) hours as needed. , Disp: , Rfl: 0    simvastatin (ZOCOR) 40 MG tablet, , Disp: , Rfl: 5    sucralfate (CARAFATE) 1 gram tablet, TK 1 T PO QID AC AND HS, Disp: , Rfl: 3    SYMBICORT 160-4.5 mcg/actuation HFAA, Inhale 2 puffs into the lungs once daily. , Disp: , Rfl: 0    tramadol (ULTRAM) 50 mg tablet, , Disp: , Rfl: 1  No current facility-administered medications for this visit.     Facility-Administered Medications Ordered in Other Visits:     0.9%  NaCl infusion, , Intravenous, Continuous, Deb Bolivar MD, Last Rate: 20 mL/hr at 08/17/18 0904    sodium chloride 0.9% flush 3 mL, 3 mL, Intravenous, PRN, Deb Bolivar MD    ALLERGIES:   Review of patient's allergies indicates:   Allergen Reactions    Lipitor [atorvastatin]      Statins-hmg-coa reductase inhibitors       PHYSICAL EXAMINATION:  LMP  (LMP Unknown)   General: Well-developed well-nourished 50 y.o. femalein no acute distress   Cardiovascular: Regular rhythm by palpation of distal pulse, normal color and temperature, no concerning varicosities on symptomatic side   Lungs: No labored breathing or wheezing appreciated   Neuro: Alert and oriented ×3   Psychiatric: well oriented to person, place and time, demonstrates normal mood and affect   Skin: No rashes, lesions or ulcers, normal temperature, turgor, and texture on involved extremity    Ortho/SPM Exam  Examination of the left knee demonstrates mild prepatellar soft tissue tenderness. Trace effusion in the knee.  Intact extensor mechanism.  Prominent pain and tenderness over the lateral joint line.  Significant pain and guarding with Steven's testing.  Ligamentously stable.    IMAGING:  X-rays including standing, weight bearing AP and flexion bilateral knees, BILATERAL knee lateral and sunrise views reviewed by me show:    Bilateral lateral joint space narrowing in mid flexion.  No interval changes since prior films.    Prior MRI reviewed by me and discussed with patient. Study shows:    1. Complex tear body segment/anterior horn lateral meniscus with  suspected extension  into the anterior root ligament and a small multilobulated intra/parameniscal cyst.   2. Advanced lateral tibial plateau chondromalacia.  Mild patellar and medial tibiofemoral chondromalacia.   3. Moderate joint effusion.    On my read there is some subtle subchondral bone marrow edema seen in the lateral compartment indicative of advanced degenerative changes.  There also is an area of subcutaneous soft tissue swelling indicative of recent knee contusion and trauma    ASSESSMENT:      ICD-10-CM ICD-9-CM   1. Primary osteoarthritis of left knee  M17.12 715.16   2. Contusion of left knee, initial encounter  S80.02XA 924.11     PLAN:     -Discussed that this  was likely re-aggravation of knee osteoarthritis  -Explained that she would not be a candidate for a TKA at this time.   -Scheduled her for visco injections  -Prescribed Celebrex  -Referred to PT    Procedures

## 2020-11-19 ENCOUNTER — HOSPITAL ENCOUNTER (OUTPATIENT)
Dept: RADIOLOGY | Facility: HOSPITAL | Age: 50
Discharge: HOME | End: 2020-11-19
Attending: ORTHOPAEDIC SURGERY
Payer: MEDICARE

## 2020-11-19 ENCOUNTER — OFFICE VISIT (OUTPATIENT)
Dept: SPORTS MEDICINE | Facility: CLINIC | Age: 50
End: 2020-11-19
Payer: MEDICARE

## 2020-11-19 VITALS
WEIGHT: 218.5 LBS | BODY MASS INDEX: 30.59 KG/M2 | HEART RATE: 69 BPM | DIASTOLIC BLOOD PRESSURE: 75 MMHG | SYSTOLIC BLOOD PRESSURE: 123 MMHG | HEIGHT: 71 IN

## 2020-11-19 DIAGNOSIS — M17.12 PRIMARY OSTEOARTHRITIS OF LEFT KNEE: ICD-10-CM

## 2020-11-19 DIAGNOSIS — S80.02XA CONTUSION OF LEFT KNEE, INITIAL ENCOUNTER: ICD-10-CM

## 2020-11-19 DIAGNOSIS — M17.12 PRIMARY OSTEOARTHRITIS OF LEFT KNEE: Primary | ICD-10-CM

## 2020-11-19 PROCEDURE — 1125F PR PAIN SEVERITY QUANTIFIED, PAIN PRESENT: ICD-10-PCS | Mod: S$GLB,,, | Performed by: ORTHOPAEDIC SURGERY

## 2020-11-19 PROCEDURE — 3078F PR MOST RECENT DIASTOLIC BLOOD PRESSURE < 80 MM HG: ICD-10-PCS | Mod: CPTII,S$GLB,, | Performed by: ORTHOPAEDIC SURGERY

## 2020-11-19 PROCEDURE — 3078F DIAST BP <80 MM HG: CPT | Mod: CPTII,S$GLB,, | Performed by: ORTHOPAEDIC SURGERY

## 2020-11-19 PROCEDURE — 73564 XR KNEE ORTHO LEFT WITH FLEXION: ICD-10-PCS | Mod: 26,LT,, | Performed by: RADIOLOGY

## 2020-11-19 PROCEDURE — 73562 XR KNEE ORTHO LEFT WITH FLEXION: ICD-10-PCS | Mod: 26,RT,, | Performed by: RADIOLOGY

## 2020-11-19 PROCEDURE — 99213 PR OFFICE/OUTPT VISIT, EST, LEVL III, 20-29 MIN: ICD-10-PCS | Mod: S$GLB,,, | Performed by: ORTHOPAEDIC SURGERY

## 2020-11-19 PROCEDURE — 73564 X-RAY EXAM KNEE 4 OR MORE: CPT | Mod: 26,LT,, | Performed by: RADIOLOGY

## 2020-11-19 PROCEDURE — 99213 OFFICE O/P EST LOW 20 MIN: CPT | Mod: S$GLB,,, | Performed by: ORTHOPAEDIC SURGERY

## 2020-11-19 PROCEDURE — 3074F PR MOST RECENT SYSTOLIC BLOOD PRESSURE < 130 MM HG: ICD-10-PCS | Mod: CPTII,S$GLB,, | Performed by: ORTHOPAEDIC SURGERY

## 2020-11-19 PROCEDURE — 3008F BODY MASS INDEX DOCD: CPT | Mod: CPTII,S$GLB,, | Performed by: ORTHOPAEDIC SURGERY

## 2020-11-19 PROCEDURE — 73562 X-RAY EXAM OF KNEE 3: CPT | Mod: TC,RT

## 2020-11-19 PROCEDURE — 3074F SYST BP LT 130 MM HG: CPT | Mod: CPTII,S$GLB,, | Performed by: ORTHOPAEDIC SURGERY

## 2020-11-19 PROCEDURE — 99999 PR PBB SHADOW E&M-EST. PATIENT-LVL IV: CPT | Mod: PBBFAC,,, | Performed by: ORTHOPAEDIC SURGERY

## 2020-11-19 PROCEDURE — 73562 X-RAY EXAM OF KNEE 3: CPT | Mod: 26,RT,, | Performed by: RADIOLOGY

## 2020-11-19 PROCEDURE — 99999 PR PBB SHADOW E&M-EST. PATIENT-LVL IV: ICD-10-PCS | Mod: PBBFAC,,, | Performed by: ORTHOPAEDIC SURGERY

## 2020-11-19 PROCEDURE — 3008F PR BODY MASS INDEX (BMI) DOCUMENTED: ICD-10-PCS | Mod: CPTII,S$GLB,, | Performed by: ORTHOPAEDIC SURGERY

## 2020-11-19 PROCEDURE — 1125F AMNT PAIN NOTED PAIN PRSNT: CPT | Mod: S$GLB,,, | Performed by: ORTHOPAEDIC SURGERY

## 2020-11-19 RX ORDER — CELECOXIB 200 MG/1
200 CAPSULE ORAL DAILY
Qty: 30 CAPSULE | Refills: 1 | Status: SHIPPED | OUTPATIENT
Start: 2020-11-19 | End: 2021-09-29

## 2020-11-27 NOTE — PROGRESS NOTES
Patient is here for follow up of her knee left chondromalacia and arthritis. She is here today for a planned Euflexxa Injection (1/3) (LOT #[id number]). She understands potential risks and benefits of the procedure which includes pseudoseptic reaction and pain. She has failed conservative management to this point for her knee pain including NSAIDS.    Exam findings remain unchanged from most recent visit. No erythema, warmth, or signs of infection.     The patient tolerated the procedure well. We discussed post-injection care of the knee. Plan for continued conservative management as discussed before. All questions were answered.     Large Joint Aspiration/Injection: L knee    Date/Time: 12/3/2020 9:45 AM  Performed by: ADALBERTO Hill MD  Authorized by: ADALBERTO Hill MD     Consent Done?:  Yes (Verbal)  Indications:  Pain  Site marked: the procedure site was marked    Timeout: prior to procedure the correct patient, procedure, and site was verified    Prep: patient was prepped and draped in usual sterile fashion      Local anesthesia used?: Yes    Local anesthetic:  Co-phenylcaine spray    Details:  Needle Size:  22 G  Ultrasonic Guidance for needle placement?: No    Approach:  Lateral  Location:  Knee  Site:  L knee  Medications:  20 mg sodium hyaluronate (EUFLEXXA) 10 mg/mL(mw 2.4 -3.6 million)  Patient tolerance:  Patient tolerated the procedure well with no immediate complications

## 2020-12-02 ENCOUNTER — PATIENT MESSAGE (OUTPATIENT)
Dept: SPORTS MEDICINE | Facility: CLINIC | Age: 50
End: 2020-12-02

## 2020-12-02 PROBLEM — R29.898 LEG WEAKNESS: Status: ACTIVE | Noted: 2020-12-02

## 2020-12-02 PROBLEM — R26.9 GAIT ABNORMALITY: Status: ACTIVE | Noted: 2020-12-02

## 2020-12-03 ENCOUNTER — OFFICE VISIT (OUTPATIENT)
Dept: SPORTS MEDICINE | Facility: CLINIC | Age: 50
End: 2020-12-03
Payer: MEDICARE

## 2020-12-03 ENCOUNTER — TELEPHONE (OUTPATIENT)
Dept: SPORTS MEDICINE | Facility: CLINIC | Age: 50
End: 2020-12-03

## 2020-12-03 VITALS
DIASTOLIC BLOOD PRESSURE: 75 MMHG | BODY MASS INDEX: 31.92 KG/M2 | HEART RATE: 68 BPM | SYSTOLIC BLOOD PRESSURE: 130 MMHG | HEIGHT: 71 IN | WEIGHT: 228 LBS

## 2020-12-03 DIAGNOSIS — M17.12 PRIMARY OSTEOARTHRITIS OF LEFT KNEE: Primary | ICD-10-CM

## 2020-12-03 PROCEDURE — 99499 UNLISTED E&M SERVICE: CPT | Mod: S$GLB,,, | Performed by: ORTHOPAEDIC SURGERY

## 2020-12-03 PROCEDURE — 3008F PR BODY MASS INDEX (BMI) DOCUMENTED: ICD-10-PCS | Mod: CPTII,S$GLB,, | Performed by: ORTHOPAEDIC SURGERY

## 2020-12-03 PROCEDURE — 99999 PR PBB SHADOW E&M-EST. PATIENT-LVL III: CPT | Mod: PBBFAC,,, | Performed by: ORTHOPAEDIC SURGERY

## 2020-12-03 PROCEDURE — 1125F PR PAIN SEVERITY QUANTIFIED, PAIN PRESENT: ICD-10-PCS | Mod: S$GLB,,, | Performed by: ORTHOPAEDIC SURGERY

## 2020-12-03 PROCEDURE — 3008F BODY MASS INDEX DOCD: CPT | Mod: CPTII,S$GLB,, | Performed by: ORTHOPAEDIC SURGERY

## 2020-12-03 PROCEDURE — 20610 LARGE JOINT ASPIRATION/INJECTION: L KNEE: ICD-10-PCS | Mod: LT,S$GLB,, | Performed by: ORTHOPAEDIC SURGERY

## 2020-12-03 PROCEDURE — 99499 NO LOS: ICD-10-PCS | Mod: S$GLB,,, | Performed by: ORTHOPAEDIC SURGERY

## 2020-12-03 PROCEDURE — 99999 PR PBB SHADOW E&M-EST. PATIENT-LVL III: ICD-10-PCS | Mod: PBBFAC,,, | Performed by: ORTHOPAEDIC SURGERY

## 2020-12-03 PROCEDURE — 1125F AMNT PAIN NOTED PAIN PRSNT: CPT | Mod: S$GLB,,, | Performed by: ORTHOPAEDIC SURGERY

## 2020-12-03 PROCEDURE — 20610 DRAIN/INJ JOINT/BURSA W/O US: CPT | Mod: LT,S$GLB,, | Performed by: ORTHOPAEDIC SURGERY

## 2020-12-03 NOTE — LETTER
December 14, 2020      Sis Mims MD  [address]  [facility] [address]           [facility] 1st Fl  [address]  [facility] [address]  Phone: [phone]          Patient: Sindhu Diaz   MR Number: [id number]   YOB: 1970   Date of Visit: 12/3/2020       Dear Dr. Sis Mims:    Thank you for referring Sindhu Diaz to me for evaluation. Attached you will find relevant portions of my assessment and plan of care.    If you have questions, please do not hesitate to call me. I look forward to following Sindhu Diaz along with you.    Sincerely,    ADALBERTO Hill MD    Enclosure  CC:  No Recipients    If you would like to receive this communication electronically, please contact [email or web address] or [phone] to request more information on [employer] access.    For providers and/or their staff who would like to refer a patient to [facility], please contact us through our one-stop-shop provider referral line, [facility], at [phone].    If you feel you have received this communication in error or would no longer like to receive these types of communications, please e-mail [email or web address]

## 2020-12-03 NOTE — TELEPHONE ENCOUNTER
Spoke with patient about approval of euflexxa injections, so we will keep her appointment for today.

## 2020-12-07 ENCOUNTER — PATIENT MESSAGE (OUTPATIENT)
Dept: ORTHOPEDICS | Facility: CLINIC | Age: 50
End: 2020-12-07

## 2020-12-08 ENCOUNTER — CLINICAL SUPPORT (OUTPATIENT)
Dept: URGENT CARE | Facility: CLINIC | Age: 50
End: 2020-12-08
Payer: MEDICARE

## 2020-12-08 VITALS — TEMPERATURE: 98 F

## 2020-12-08 DIAGNOSIS — Z20.822 EXPOSURE TO COVID-19 VIRUS: Primary | ICD-10-CM

## 2020-12-08 LAB
CTP QC/QA: YES
SARS-COV-2 RDRP RESP QL NAA+PROBE: NEGATIVE

## 2020-12-08 PROCEDURE — U0002: ICD-10-PCS | Mod: QW,S$GLB,, | Performed by: PHYSICIAN ASSISTANT

## 2020-12-08 PROCEDURE — U0002 COVID-19 LAB TEST NON-CDC: HCPCS | Mod: QW,S$GLB,, | Performed by: PHYSICIAN ASSISTANT

## 2020-12-14 ENCOUNTER — PATIENT MESSAGE (OUTPATIENT)
Dept: ORTHOPEDICS | Facility: CLINIC | Age: 50
End: 2020-12-14

## 2020-12-14 NOTE — PROGRESS NOTES
Patient is here for follow up of her knee left chondromalacia and arthritis. She is here today for a planned Euflexxa Injection (2/3) (LOT #[id number]). She understands potential risks and benefits of the procedure which includes pseudoseptic reaction and pain. She has failed conservative management to this point for her knee pain including NSAIDS.    Exam findings remain unchanged from most recent visit. No erythema, warmth, or signs of infection.     The patient tolerated the procedure well. We discussed post-injection care of the knee. Plan for continued conservative management as discussed before. All questions were answered.     Large Joint Aspiration/Injection: L knee    Date/Time: 12/17/2020 11:30 AM  Performed by: ADALBERTO Hill MD  Authorized by: ADALBERTO Hill MD     Consent Done?:  Yes (Verbal)  Indications:  Pain  Site marked: the procedure site was marked    Timeout: prior to procedure the correct patient, procedure, and site was verified    Prep: patient was prepped and draped in usual sterile fashion      Local anesthesia used?: Yes    Local anesthetic:  Co-phenylcaine spray    Details:  Needle Size:  22 G  Ultrasonic Guidance for needle placement?: No    Approach:  Lateral  Location:  Knee  Site:  L knee  Medications:  20 mg sodium hyaluronate (EUFLEXXA) 10 mg/mL(mw 2.4 -3.6 million)  Patient tolerance:  Patient tolerated the procedure well with no immediate complications

## 2020-12-17 ENCOUNTER — OFFICE VISIT (OUTPATIENT)
Dept: SPORTS MEDICINE | Facility: CLINIC | Age: 50
End: 2020-12-17
Payer: MEDICARE

## 2020-12-17 VITALS
HEART RATE: 64 BPM | HEIGHT: 71 IN | SYSTOLIC BLOOD PRESSURE: 124 MMHG | WEIGHT: 228 LBS | BODY MASS INDEX: 31.92 KG/M2 | DIASTOLIC BLOOD PRESSURE: 77 MMHG

## 2020-12-17 DIAGNOSIS — M17.12 PRIMARY OSTEOARTHRITIS OF LEFT KNEE: Primary | ICD-10-CM

## 2020-12-17 PROCEDURE — 99999 PR PBB SHADOW E&M-EST. PATIENT-LVL III: CPT | Mod: PBBFAC,,, | Performed by: ORTHOPAEDIC SURGERY

## 2020-12-17 PROCEDURE — 99499 UNLISTED E&M SERVICE: CPT | Mod: S$GLB,,, | Performed by: ORTHOPAEDIC SURGERY

## 2020-12-17 PROCEDURE — 3008F BODY MASS INDEX DOCD: CPT | Mod: CPTII,S$GLB,, | Performed by: ORTHOPAEDIC SURGERY

## 2020-12-17 PROCEDURE — 3008F PR BODY MASS INDEX (BMI) DOCUMENTED: ICD-10-PCS | Mod: CPTII,S$GLB,, | Performed by: ORTHOPAEDIC SURGERY

## 2020-12-17 PROCEDURE — 1125F AMNT PAIN NOTED PAIN PRSNT: CPT | Mod: S$GLB,,, | Performed by: ORTHOPAEDIC SURGERY

## 2020-12-17 PROCEDURE — 20610 LARGE JOINT ASPIRATION/INJECTION: L KNEE: ICD-10-PCS | Mod: LT,S$GLB,, | Performed by: ORTHOPAEDIC SURGERY

## 2020-12-17 PROCEDURE — 20610 DRAIN/INJ JOINT/BURSA W/O US: CPT | Mod: LT,S$GLB,, | Performed by: ORTHOPAEDIC SURGERY

## 2020-12-17 PROCEDURE — 1125F PR PAIN SEVERITY QUANTIFIED, PAIN PRESENT: ICD-10-PCS | Mod: S$GLB,,, | Performed by: ORTHOPAEDIC SURGERY

## 2020-12-17 PROCEDURE — 99999 PR PBB SHADOW E&M-EST. PATIENT-LVL III: ICD-10-PCS | Mod: PBBFAC,,, | Performed by: ORTHOPAEDIC SURGERY

## 2020-12-17 PROCEDURE — 99499 NO LOS: ICD-10-PCS | Mod: S$GLB,,, | Performed by: ORTHOPAEDIC SURGERY

## 2020-12-17 NOTE — LETTER
December 17, 2020      Sis Mims MD  [address]  [facility] [address]           [facility] 1st Fl  [address]  [facility] [address]  Phone: [phone]          Patient: Sindhu Diaz   MR Number: [id number]   YOB: 1970   Date of Visit: 12/17/2020       Dear Dr. Sis Mims:    Thank you for referring Sindhu Diaz to me for evaluation. Attached you will find relevant portions of my assessment and plan of care.    If you have questions, please do not hesitate to call me. I look forward to following Sindhu Diaz along with you.    Sincerely,    ADALBERTO Hill MD    Enclosure  CC:  No Recipients    If you would like to receive this communication electronically, please contact [email or web address] or [phone] to request more information on [employer] access.    For providers and/or their staff who would like to refer a patient to [facility], please contact us through our one-stop-shop provider referral line, [facility], at [phone].    If you feel you have received this communication in error or would no longer like to receive these types of communications, please e-mail [email or web address]

## 2020-12-22 ENCOUNTER — OFFICE VISIT (OUTPATIENT)
Dept: GASTROENTEROLOGY | Facility: CLINIC | Age: 50
End: 2020-12-22
Payer: MEDICARE

## 2020-12-22 VITALS
HEART RATE: 71 BPM | BODY MASS INDEX: 31.76 KG/M2 | HEIGHT: 71 IN | SYSTOLIC BLOOD PRESSURE: 133 MMHG | WEIGHT: 226.88 LBS | DIASTOLIC BLOOD PRESSURE: 77 MMHG

## 2020-12-22 DIAGNOSIS — K21.9 GASTROESOPHAGEAL REFLUX DISEASE, UNSPECIFIED WHETHER ESOPHAGITIS PRESENT: ICD-10-CM

## 2020-12-22 DIAGNOSIS — R11.10 REGURGITATION OF FOOD: Primary | ICD-10-CM

## 2020-12-22 PROCEDURE — 99999 PR PBB SHADOW E&M-EST. PATIENT-LVL III: ICD-10-PCS | Mod: PBBFAC,,, | Performed by: FAMILY MEDICINE

## 2020-12-22 PROCEDURE — 3078F DIAST BP <80 MM HG: CPT | Mod: CPTII,S$GLB,, | Performed by: FAMILY MEDICINE

## 2020-12-22 PROCEDURE — 3075F SYST BP GE 130 - 139MM HG: CPT | Mod: CPTII,S$GLB,, | Performed by: FAMILY MEDICINE

## 2020-12-22 PROCEDURE — 1125F AMNT PAIN NOTED PAIN PRSNT: CPT | Mod: S$GLB,,, | Performed by: FAMILY MEDICINE

## 2020-12-22 PROCEDURE — 99213 OFFICE O/P EST LOW 20 MIN: CPT | Mod: S$GLB,,, | Performed by: FAMILY MEDICINE

## 2020-12-22 PROCEDURE — 99213 PR OFFICE/OUTPT VISIT, EST, LEVL III, 20-29 MIN: ICD-10-PCS | Mod: S$GLB,,, | Performed by: FAMILY MEDICINE

## 2020-12-22 PROCEDURE — 3008F PR BODY MASS INDEX (BMI) DOCUMENTED: ICD-10-PCS | Mod: CPTII,S$GLB,, | Performed by: FAMILY MEDICINE

## 2020-12-22 PROCEDURE — 99999 PR PBB SHADOW E&M-EST. PATIENT-LVL III: CPT | Mod: PBBFAC,,, | Performed by: FAMILY MEDICINE

## 2020-12-22 PROCEDURE — 1125F PR PAIN SEVERITY QUANTIFIED, PAIN PRESENT: ICD-10-PCS | Mod: S$GLB,,, | Performed by: FAMILY MEDICINE

## 2020-12-22 PROCEDURE — 3008F BODY MASS INDEX DOCD: CPT | Mod: CPTII,S$GLB,, | Performed by: FAMILY MEDICINE

## 2020-12-22 PROCEDURE — 3078F PR MOST RECENT DIASTOLIC BLOOD PRESSURE < 80 MM HG: ICD-10-PCS | Mod: CPTII,S$GLB,, | Performed by: FAMILY MEDICINE

## 2020-12-22 PROCEDURE — 3075F PR MOST RECENT SYSTOLIC BLOOD PRESS GE 130-139MM HG: ICD-10-PCS | Mod: CPTII,S$GLB,, | Performed by: FAMILY MEDICINE

## 2020-12-22 RX ORDER — EZETIMIBE 10 MG/1
TABLET ORAL
COMMUNITY
Start: 2020-10-25

## 2020-12-22 RX ORDER — METOCLOPRAMIDE 10 MG/1
1 TABLET ORAL
COMMUNITY
Start: 2020-11-20

## 2020-12-22 RX ORDER — ACETAMINOPHEN 500 MG
TABLET ORAL
COMMUNITY
Start: 2020-09-29

## 2020-12-22 NOTE — PROGRESS NOTES
[facility] Consultation Note    Reason for Consult:  The primary encounter diagnosis was Regurgitation of food. A diagnosis of Gastroesophageal reflux disease, unspecified whether esophagitis present was also pertinent to this visit.    PCP:   Esvin Garcia Iii       Referring MD:  No referring provider defined for this encounter.    HPI:  This is a 50 y.o. female here for evaluation of evaluation of vomiting. She is a new patient to me, established to Dr August at [facility].    Here for further evaluation of same complaints of reflux. This is the same issues she has been experiencing since workup in 2018.  States she has reflux after every meal, even after drinking water. This will also wake her up at night. Reports she regurgitates food particles. Endorses occasional regurgitation and rechewing. These episodes cause her significant chest discomfort. She is followed by Dr Casey with cardiology and has been worked up in their department.  Denies nausea, dysphagia, nausea  Has tried Imipramine per Mata up to 50 mg nightly with no relief.  Is currently uses Sucalfate and Reglan 4x daily from PCP with minimal relief.  On Linzess for history of IBS-C, takes this PRN;   Denies hematochezia or melena  Uses Aspirin 81 mg daily, BC powder and Aleve PRN;    ROS:  Constitutional: No fevers, chills, No unintentional weight loss  ENT: No allergies  CV: + chest pain with reflux/regurgitation episodes  Pulm: No cough, reports chronic SOB r/t asthma  Ophtho: + vision changes, reports blurry vision r/t cataracts  GI: see HPI  Derm: No rash  Heme: No lymphadenopathy, No bruising  MSK: + arthritis, bilateral knees L >> R  : No dysuria, No hematuria  Endo: No hot or cold intolerance  Neuro: No syncope, No seizure  Psych: No anxiety, No depression    Medical History:  has a past medical history of Asthma, GERD (gastroesophageal reflux disease), Hyperlipidemia, IBS (irritable bowel syndrome), Sleep  apnea, Tension headache, chronic, and Vaginal delivery.    Surgical History:  has a past surgical history that includes Hysterectomy; Cholecystectomy; Bunionectomy (Bilateral); and Colonoscopy (N/A, 8/17/2018).    Family History: family history includes Cataracts in her mother; Heart attack in her father; Heart disease in her sister; Lymphoma in her mother; No Known Problems in her brother, maternal aunt, maternal grandfather, maternal grandmother, maternal uncle, paternal aunt, paternal grandfather, paternal grandmother, and paternal uncle..     Social History:  reports that she quit smoking about 18 years ago. Her smoking use included cigarettes. She quit after 15.00 years of use. She has never used smokeless tobacco. She reports that she does not drink alcohol or use drugs.    Review of patient's allergies indicates:   Allergen Reactions    Lipitor [atorvastatin]     Statins-hmg-coa reductase inhibitors        Current Outpatient Medications on File Prior to Visit   Medication Sig Dispense Refill    albuterol sulfate 2.5 mg/0.5 mL Nebu Take 2.5 mg by nebulization every 4 (four) hours as needed. Rescue 30 each 0    aspirin 81 MG Chew Take 1 tablet (81 mg total) by mouth once daily.  0    celecoxib (CELEBREX) 200 MG capsule Take 1 capsule (200 mg total) by mouth once daily. 30 capsule 1    gabapentin (NEURONTIN) 300 MG capsule Take 1 capsule (300 mg total) by mouth 3 (three) times daily. 90 capsule 3    hydrocodone-acetaminophen 10-325mg (NORCO)  mg Tab   0    imipramine (TOFRANIL) 25 MG tablet Take 1 tablet (25 mg total) by mouth every evening. 30 tablet 11    LINZESS 290 mcg Cap TK ONE C PO QD ON EMPTY STOMACH  2    magnesium oxide (MAG-OX) 400 mg (241.3 mg magnesium) tablet Take 1 tablet (400 mg total) by mouth once daily. 90 tablet 3    metoprolol succinate (TOPROL-XL) 25 MG 24 hr tablet Take 1 tablet (25 mg total) by mouth once daily. 90 tablet 3    oxybutynin (DITROPAN XL) 15 MG TR24 TK 1 T  "PO BID  0    PROAIR HFA 90 mcg/actuation inhaler Inhale 1 puff into the lungs every 4 (four) hours as needed.   0    simvastatin (ZOCOR) 40 MG tablet   5    sucralfate (CARAFATE) 1 gram tablet TK 1 T PO QID AC AND HS  3    SYMBICORT 160-4.5 mcg/actuation HFAA Inhale 2 puffs into the lungs once daily.   0    tramadol (ULTRAM) 50 mg tablet   1    cholecalciferol, vitamin D3, (VITAMIN D3) 50 mcg (2,000 unit) Cap TK 1 C PO ONCE D      ezetimibe (ZETIA) 10 mg tablet TK 1 T PO D      metoclopramide HCl (REGLAN) 10 MG tablet Take 1 tablet by mouth before meals and at bedtime as needed.       Current Facility-Administered Medications on File Prior to Visit   Medication Dose Route Frequency Provider Last Rate Last Dose    0.9%  NaCl infusion   Intravenous Continuous Deb Bolivar MD 20 mL/hr at 08/17/18 0904      sodium chloride 0.9% flush 3 mL  3 mL Intravenous PRN Deb Bolivar MD             Objective Findings:    Vital Signs Reviewed:  /77   Pulse 71   Ht 5' 11" (1.803 m)   Wt 102.9 kg (226 lb 13.7 oz)   LMP  (LMP Unknown)   BMI 31.64 kg/m²   Body mass index is 31.64 kg/m².    Physical Exam:  General Appearance: Well appearing in no acute distress  Head:   Normocephalic, without obvious abnormality  Eyes:    No scleral icterus  ENT: Neck supple, no supraclavicular lymphadenopathy noted  Lungs: CTA bilaterally in anterior and posterior fields, no wheezes, no crackles.  Heart:  Regular rate and rhythm, S1, S2 normal, no murmurs heard  Abdomen: Soft, non tender, non distended with positive bowel sounds in all four quadrants. No hepatosplenomegaly, ascites, or mass  Extremities: No edema  Skin: No rash  Neurologic: AAO x 3      Labs Reviewed:  Lab Results   Component Value Date    WBC 4.77 11/04/2020    HGB 12.9 11/04/2020    HCT 41.2 11/04/2020     11/04/2020    CHOL 248 (H) 11/04/2020    TRIG 115 11/04/2020    HDL 59 11/04/2020    ALT 25 11/04/2020    AST 25 11/04/2020     " 11/04/2020    K 4.0 11/04/2020     11/04/2020    CREATININE 0.67 11/04/2020    BUN 10 11/04/2020    CO2 27 11/04/2020    INR 0.9 04/10/2017    HGBA1C 5.4 11/04/2020       No results found for: FERRITIN, TIBC, TRANSFERRIN       Imaging reviewed:  3/6/2020 CT Abd Pelvis with contrast  1. No acute intra-abdominal injury  2. Lower pelvic soft tissue contusion.    Endoscopy reviewed:  8/17/2018 Colonoscopy: good prep, difficult r/t significant looping; no specimens collected  3/14/2018 BRAVO pH Capsule: normal pH study OFF PPI; DeMeester score: 7.0; 2 episodes of regurgitation correlated with reflux  3/7/2018 High-resolution manometry: normal esophageal manometry  3/7/2018 EGD: non-bleeding erosive gastropathy, bravo pH capsule deployed;    50 y.o. female here for evaluation of:    Assessment:  1. Regurgitation of food    2. Gastroesophageal reflux disease, unspecified whether esophagitis present         Discussed with Ms Diaz that she has undergone a thorough evaluation regarding this complaint in the past. She is unsure why surgery was not pursued. I endorsed that I would prefer to discuss her case with one of physicians prior to ordering any tests at this time. She is agreeable to this plan.    Recommendations:  1. Confer with GI MD for next step for evaluation    F/U after discussing case with GI MD    25 minutes total face to face >50% spent in counseling and coordination of care      Thank you so much for allowing me to participate in the care of Sindhu Diaz    JOSE Hale

## 2020-12-28 ENCOUNTER — PATIENT MESSAGE (OUTPATIENT)
Dept: GASTROENTEROLOGY | Facility: CLINIC | Age: 50
End: 2020-12-28

## 2021-01-05 ENCOUNTER — OFFICE VISIT (OUTPATIENT)
Dept: CARDIOLOGY | Facility: CLINIC | Age: 51
End: 2021-01-05
Payer: MEDICARE

## 2021-01-05 VITALS
OXYGEN SATURATION: 96 % | WEIGHT: 227.06 LBS | HEIGHT: 71 IN | HEART RATE: 62 BPM | SYSTOLIC BLOOD PRESSURE: 126 MMHG | DIASTOLIC BLOOD PRESSURE: 69 MMHG | BODY MASS INDEX: 31.79 KG/M2

## 2021-01-05 DIAGNOSIS — R00.2 PALPITATIONS: Primary | ICD-10-CM

## 2021-01-05 DIAGNOSIS — E78.00 PURE HYPERCHOLESTEROLEMIA: ICD-10-CM

## 2021-01-05 PROCEDURE — 99999 PR PBB SHADOW E&M-EST. PATIENT-LVL IV: ICD-10-PCS | Mod: PBBFAC,,, | Performed by: INTERNAL MEDICINE

## 2021-01-05 PROCEDURE — 99999 PR PBB SHADOW E&M-EST. PATIENT-LVL IV: CPT | Mod: PBBFAC,,, | Performed by: INTERNAL MEDICINE

## 2021-01-05 PROCEDURE — 99214 PR OFFICE/OUTPT VISIT, EST, LEVL IV, 30-39 MIN: ICD-10-PCS | Mod: S$GLB,,, | Performed by: INTERNAL MEDICINE

## 2021-01-05 PROCEDURE — 3074F SYST BP LT 130 MM HG: CPT | Mod: CPTII,S$GLB,, | Performed by: INTERNAL MEDICINE

## 2021-01-05 PROCEDURE — 1126F PR PAIN SEVERITY QUANTIFIED, NO PAIN PRESENT: ICD-10-PCS | Mod: S$GLB,,, | Performed by: INTERNAL MEDICINE

## 2021-01-05 PROCEDURE — 3008F BODY MASS INDEX DOCD: CPT | Mod: CPTII,S$GLB,, | Performed by: INTERNAL MEDICINE

## 2021-01-05 PROCEDURE — 3078F PR MOST RECENT DIASTOLIC BLOOD PRESSURE < 80 MM HG: ICD-10-PCS | Mod: CPTII,S$GLB,, | Performed by: INTERNAL MEDICINE

## 2021-01-05 PROCEDURE — 3078F DIAST BP <80 MM HG: CPT | Mod: CPTII,S$GLB,, | Performed by: INTERNAL MEDICINE

## 2021-01-05 PROCEDURE — 1126F AMNT PAIN NOTED NONE PRSNT: CPT | Mod: S$GLB,,, | Performed by: INTERNAL MEDICINE

## 2021-01-05 PROCEDURE — 3074F PR MOST RECENT SYSTOLIC BLOOD PRESSURE < 130 MM HG: ICD-10-PCS | Mod: CPTII,S$GLB,, | Performed by: INTERNAL MEDICINE

## 2021-01-05 PROCEDURE — 99214 OFFICE O/P EST MOD 30 MIN: CPT | Mod: S$GLB,,, | Performed by: INTERNAL MEDICINE

## 2021-01-05 PROCEDURE — 3008F PR BODY MASS INDEX (BMI) DOCUMENTED: ICD-10-PCS | Mod: CPTII,S$GLB,, | Performed by: INTERNAL MEDICINE

## 2021-01-06 ENCOUNTER — PATIENT MESSAGE (OUTPATIENT)
Dept: SPORTS MEDICINE | Facility: CLINIC | Age: 51
End: 2021-01-06

## 2021-01-07 ENCOUNTER — PATIENT MESSAGE (OUTPATIENT)
Dept: SPORTS MEDICINE | Facility: CLINIC | Age: 51
End: 2021-01-07

## 2021-01-07 ENCOUNTER — PATIENT MESSAGE (OUTPATIENT)
Dept: GASTROENTEROLOGY | Facility: CLINIC | Age: 51
End: 2021-01-07

## 2021-01-08 ENCOUNTER — PATIENT MESSAGE (OUTPATIENT)
Dept: SPORTS MEDICINE | Facility: CLINIC | Age: 51
End: 2021-01-08

## 2021-01-14 ENCOUNTER — OFFICE VISIT (OUTPATIENT)
Dept: SPORTS MEDICINE | Facility: CLINIC | Age: 51
End: 2021-01-14
Payer: MEDICARE

## 2021-01-14 VITALS
WEIGHT: 227 LBS | SYSTOLIC BLOOD PRESSURE: 118 MMHG | DIASTOLIC BLOOD PRESSURE: 65 MMHG | BODY MASS INDEX: 31.78 KG/M2 | HEIGHT: 71 IN | HEART RATE: 68 BPM

## 2021-01-14 DIAGNOSIS — M17.12 PRIMARY OSTEOARTHRITIS OF LEFT KNEE: Primary | ICD-10-CM

## 2021-01-14 PROCEDURE — 3078F DIAST BP <80 MM HG: CPT | Mod: CPTII,S$GLB,, | Performed by: ORTHOPAEDIC SURGERY

## 2021-01-14 PROCEDURE — 20610 DRAIN/INJ JOINT/BURSA W/O US: CPT | Mod: LT,S$GLB,, | Performed by: ORTHOPAEDIC SURGERY

## 2021-01-14 PROCEDURE — 99999 PR PBB SHADOW E&M-EST. PATIENT-LVL IV: CPT | Mod: PBBFAC,,, | Performed by: ORTHOPAEDIC SURGERY

## 2021-01-14 PROCEDURE — 3074F SYST BP LT 130 MM HG: CPT | Mod: CPTII,S$GLB,, | Performed by: ORTHOPAEDIC SURGERY

## 2021-01-14 PROCEDURE — 3074F PR MOST RECENT SYSTOLIC BLOOD PRESSURE < 130 MM HG: ICD-10-PCS | Mod: CPTII,S$GLB,, | Performed by: ORTHOPAEDIC SURGERY

## 2021-01-14 PROCEDURE — 99213 PR OFFICE/OUTPT VISIT, EST, LEVL III, 20-29 MIN: ICD-10-PCS | Mod: 25,S$GLB,, | Performed by: ORTHOPAEDIC SURGERY

## 2021-01-14 PROCEDURE — 99213 OFFICE O/P EST LOW 20 MIN: CPT | Mod: 25,S$GLB,, | Performed by: ORTHOPAEDIC SURGERY

## 2021-01-14 PROCEDURE — 3008F BODY MASS INDEX DOCD: CPT | Mod: CPTII,S$GLB,, | Performed by: ORTHOPAEDIC SURGERY

## 2021-01-14 PROCEDURE — 1126F AMNT PAIN NOTED NONE PRSNT: CPT | Mod: S$GLB,,, | Performed by: ORTHOPAEDIC SURGERY

## 2021-01-14 PROCEDURE — 3008F PR BODY MASS INDEX (BMI) DOCUMENTED: ICD-10-PCS | Mod: CPTII,S$GLB,, | Performed by: ORTHOPAEDIC SURGERY

## 2021-01-14 PROCEDURE — 20610 LARGE JOINT ASPIRATION/INJECTION: L KNEE: ICD-10-PCS | Mod: LT,S$GLB,, | Performed by: ORTHOPAEDIC SURGERY

## 2021-01-14 PROCEDURE — 1126F PR PAIN SEVERITY QUANTIFIED, NO PAIN PRESENT: ICD-10-PCS | Mod: S$GLB,,, | Performed by: ORTHOPAEDIC SURGERY

## 2021-01-14 PROCEDURE — 3078F PR MOST RECENT DIASTOLIC BLOOD PRESSURE < 80 MM HG: ICD-10-PCS | Mod: CPTII,S$GLB,, | Performed by: ORTHOPAEDIC SURGERY

## 2021-01-14 PROCEDURE — 99999 PR PBB SHADOW E&M-EST. PATIENT-LVL IV: ICD-10-PCS | Mod: PBBFAC,,, | Performed by: ORTHOPAEDIC SURGERY

## 2021-01-14 RX ADMIN — TRIAMCINOLONE ACETONIDE 40 MG: 40 INJECTION, SUSPENSION INTRA-ARTICULAR; INTRAMUSCULAR at 09:01

## 2021-01-15 ENCOUNTER — PATIENT MESSAGE (OUTPATIENT)
Dept: GASTROENTEROLOGY | Facility: CLINIC | Age: 51
End: 2021-01-15

## 2021-01-22 ENCOUNTER — PATIENT MESSAGE (OUTPATIENT)
Dept: SPORTS MEDICINE | Facility: CLINIC | Age: 51
End: 2021-01-22

## 2021-01-26 ENCOUNTER — TELEPHONE (OUTPATIENT)
Dept: SPORTS MEDICINE | Facility: CLINIC | Age: 51
End: 2021-01-26

## 2021-01-26 DIAGNOSIS — M17.12 PRIMARY OSTEOARTHRITIS OF LEFT KNEE: Primary | ICD-10-CM

## 2021-01-29 ENCOUNTER — TELEPHONE (OUTPATIENT)
Dept: SPORTS MEDICINE | Facility: CLINIC | Age: 51
End: 2021-01-29

## 2021-01-31 RX ORDER — TRIAMCINOLONE ACETONIDE 40 MG/ML
40 INJECTION, SUSPENSION INTRA-ARTICULAR; INTRAMUSCULAR
Status: DISCONTINUED | OUTPATIENT
Start: 2021-01-14 | End: 2021-01-31 | Stop reason: HOSPADM

## 2021-02-01 ENCOUNTER — TELEPHONE (OUTPATIENT)
Dept: SPORTS MEDICINE | Facility: CLINIC | Age: 51
End: 2021-02-01

## 2021-02-04 ENCOUNTER — HOSPITAL ENCOUNTER (OUTPATIENT)
Dept: RADIOLOGY | Facility: HOSPITAL | Age: 51
Discharge: HOME | End: 2021-02-04
Attending: ORTHOPAEDIC SURGERY
Payer: MEDICARE

## 2021-02-04 DIAGNOSIS — M17.12 PRIMARY OSTEOARTHRITIS OF LEFT KNEE: ICD-10-CM

## 2021-02-04 PROCEDURE — 73721 MRI KNEE WITHOUT CONTRAST LEFT: ICD-10-PCS | Mod: 26,LT,, | Performed by: RADIOLOGY

## 2021-02-04 PROCEDURE — 73721 MRI JNT OF LWR EXTRE W/O DYE: CPT | Mod: TC,LT

## 2021-02-04 PROCEDURE — 73721 MRI JNT OF LWR EXTRE W/O DYE: CPT | Mod: 26,LT,, | Performed by: RADIOLOGY

## 2021-02-09 ENCOUNTER — PATIENT MESSAGE (OUTPATIENT)
Dept: SPORTS MEDICINE | Facility: CLINIC | Age: 51
End: 2021-02-09

## 2021-02-09 ENCOUNTER — LAB VISIT (OUTPATIENT)
Dept: INTERNAL MEDICINE | Facility: CLINIC | Age: 51
End: 2021-02-09
Payer: MEDICARE

## 2021-02-09 ENCOUNTER — HOSPITAL ENCOUNTER (OUTPATIENT)
Dept: RADIOLOGY | Facility: HOSPITAL | Age: 51
Discharge: HOME | End: 2021-02-09
Attending: ORTHOPAEDIC SURGERY
Payer: MEDICARE

## 2021-02-09 ENCOUNTER — OFFICE VISIT (OUTPATIENT)
Dept: SPORTS MEDICINE | Facility: CLINIC | Age: 51
End: 2021-02-09
Payer: MEDICARE

## 2021-02-09 ENCOUNTER — PATIENT MESSAGE (OUTPATIENT)
Dept: SURGERY | Facility: HOSPITAL | Age: 51
End: 2021-02-09

## 2021-02-09 VITALS
SYSTOLIC BLOOD PRESSURE: 128 MMHG | BODY MASS INDEX: 31.78 KG/M2 | HEIGHT: 71 IN | DIASTOLIC BLOOD PRESSURE: 75 MMHG | WEIGHT: 227 LBS | HEART RATE: 64 BPM

## 2021-02-09 DIAGNOSIS — M22.42 CHONDROMALACIA OF LEFT PATELLA: ICD-10-CM

## 2021-02-09 DIAGNOSIS — M17.12 PRIMARY OSTEOARTHRITIS OF LEFT KNEE: Primary | ICD-10-CM

## 2021-02-09 DIAGNOSIS — R93.7 BONE MARROW EDEMA: ICD-10-CM

## 2021-02-09 DIAGNOSIS — M17.12 PRIMARY OSTEOARTHRITIS OF LEFT KNEE: ICD-10-CM

## 2021-02-09 DIAGNOSIS — M23.201 OLD TEAR OF LATERAL MENISCUS OF LEFT KNEE, UNSPECIFIED TEAR TYPE: Primary | ICD-10-CM

## 2021-02-09 PROCEDURE — 3008F PR BODY MASS INDEX (BMI) DOCUMENTED: ICD-10-PCS | Mod: CPTII,S$GLB,, | Performed by: ORTHOPAEDIC SURGERY

## 2021-02-09 PROCEDURE — 3008F BODY MASS INDEX DOCD: CPT | Mod: CPTII,S$GLB,, | Performed by: ORTHOPAEDIC SURGERY

## 2021-02-09 PROCEDURE — 99999 PR PBB SHADOW E&M-EST. PATIENT-LVL IV: CPT | Mod: PBBFAC,,, | Performed by: ORTHOPAEDIC SURGERY

## 2021-02-09 PROCEDURE — 73564 X-RAY EXAM KNEE 4 OR MORE: CPT | Mod: TC,LT

## 2021-02-09 PROCEDURE — 99214 PR OFFICE/OUTPT VISIT, EST, LEVL IV, 30-39 MIN: ICD-10-PCS | Mod: S$GLB,,, | Performed by: ORTHOPAEDIC SURGERY

## 2021-02-09 PROCEDURE — U0005 INFEC AGEN DETEC AMPLI PROBE: HCPCS

## 2021-02-09 PROCEDURE — 1126F PR PAIN SEVERITY QUANTIFIED, NO PAIN PRESENT: ICD-10-PCS | Mod: S$GLB,,, | Performed by: ORTHOPAEDIC SURGERY

## 2021-02-09 PROCEDURE — 73564 XR KNEE ORTHO LEFT WITH FLEXION: ICD-10-PCS | Mod: 26,LT,, | Performed by: RADIOLOGY

## 2021-02-09 PROCEDURE — 99999 PR PBB SHADOW E&M-EST. PATIENT-LVL IV: ICD-10-PCS | Mod: PBBFAC,,, | Performed by: ORTHOPAEDIC SURGERY

## 2021-02-09 PROCEDURE — 73564 X-RAY EXAM KNEE 4 OR MORE: CPT | Mod: 26,LT,, | Performed by: RADIOLOGY

## 2021-02-09 PROCEDURE — 3078F PR MOST RECENT DIASTOLIC BLOOD PRESSURE < 80 MM HG: ICD-10-PCS | Mod: CPTII,S$GLB,, | Performed by: ORTHOPAEDIC SURGERY

## 2021-02-09 PROCEDURE — 73562 XR KNEE ORTHO LEFT WITH FLEXION: ICD-10-PCS | Mod: 26,RT,, | Performed by: RADIOLOGY

## 2021-02-09 PROCEDURE — 1126F AMNT PAIN NOTED NONE PRSNT: CPT | Mod: S$GLB,,, | Performed by: ORTHOPAEDIC SURGERY

## 2021-02-09 PROCEDURE — U0003 INFECTIOUS AGENT DETECTION BY NUCLEIC ACID (DNA OR RNA); SEVERE ACUTE RESPIRATORY SYNDROME CORONAVIRUS 2 (SARS-COV-2) (CORONAVIRUS DISEASE [COVID-19]), AMPLIFIED PROBE TECHNIQUE, MAKING USE OF HIGH THROUGHPUT TECHNOLOGIES AS DESCRIBED BY CMS-2020-01-R: HCPCS

## 2021-02-09 PROCEDURE — 3074F SYST BP LT 130 MM HG: CPT | Mod: CPTII,S$GLB,, | Performed by: ORTHOPAEDIC SURGERY

## 2021-02-09 PROCEDURE — 99214 OFFICE O/P EST MOD 30 MIN: CPT | Mod: S$GLB,,, | Performed by: ORTHOPAEDIC SURGERY

## 2021-02-09 PROCEDURE — 3078F DIAST BP <80 MM HG: CPT | Mod: CPTII,S$GLB,, | Performed by: ORTHOPAEDIC SURGERY

## 2021-02-09 PROCEDURE — 73562 X-RAY EXAM OF KNEE 3: CPT | Mod: 26,RT,, | Performed by: RADIOLOGY

## 2021-02-09 PROCEDURE — 3074F PR MOST RECENT SYSTOLIC BLOOD PRESSURE < 130 MM HG: ICD-10-PCS | Mod: CPTII,S$GLB,, | Performed by: ORTHOPAEDIC SURGERY

## 2021-02-10 ENCOUNTER — OFFICE VISIT (OUTPATIENT)
Dept: SPORTS MEDICINE | Facility: CLINIC | Age: 51
End: 2021-02-10
Payer: MEDICARE

## 2021-02-10 VITALS
SYSTOLIC BLOOD PRESSURE: 119 MMHG | HEIGHT: 71 IN | WEIGHT: 227 LBS | HEART RATE: 66 BPM | BODY MASS INDEX: 31.78 KG/M2 | DIASTOLIC BLOOD PRESSURE: 66 MMHG

## 2021-02-10 DIAGNOSIS — M22.42 CHONDROMALACIA OF LEFT PATELLA: ICD-10-CM

## 2021-02-10 DIAGNOSIS — M23.201 OLD TEAR OF LATERAL MENISCUS OF LEFT KNEE, UNSPECIFIED TEAR TYPE: Primary | ICD-10-CM

## 2021-02-10 LAB — SARS-COV-2 RNA RESP QL NAA+PROBE: NOT DETECTED

## 2021-02-10 PROCEDURE — 99999 PR PBB SHADOW E&M-EST. PATIENT-LVL V: ICD-10-PCS | Mod: PBBFAC,,, | Performed by: PHYSICIAN ASSISTANT

## 2021-02-10 PROCEDURE — 99499 UNLISTED E&M SERVICE: CPT | Mod: S$GLB,,, | Performed by: PHYSICIAN ASSISTANT

## 2021-02-10 PROCEDURE — 1125F PR PAIN SEVERITY QUANTIFIED, PAIN PRESENT: ICD-10-PCS | Mod: S$GLB,,, | Performed by: PHYSICIAN ASSISTANT

## 2021-02-10 PROCEDURE — 1125F AMNT PAIN NOTED PAIN PRSNT: CPT | Mod: S$GLB,,, | Performed by: PHYSICIAN ASSISTANT

## 2021-02-10 PROCEDURE — 99499 NO LOS: ICD-10-PCS | Mod: S$GLB,,, | Performed by: PHYSICIAN ASSISTANT

## 2021-02-10 PROCEDURE — 3008F PR BODY MASS INDEX (BMI) DOCUMENTED: ICD-10-PCS | Mod: CPTII,S$GLB,, | Performed by: PHYSICIAN ASSISTANT

## 2021-02-10 PROCEDURE — 99999 PR PBB SHADOW E&M-EST. PATIENT-LVL V: CPT | Mod: PBBFAC,,, | Performed by: PHYSICIAN ASSISTANT

## 2021-02-10 PROCEDURE — 3008F BODY MASS INDEX DOCD: CPT | Mod: CPTII,S$GLB,, | Performed by: PHYSICIAN ASSISTANT

## 2021-02-10 RX ORDER — ONDANSETRON 4 MG/1
4 TABLET, FILM COATED ORAL EVERY 8 HOURS PRN
Qty: 21 TABLET | Refills: 0 | Status: SHIPPED | OUTPATIENT
Start: 2021-02-10 | End: 2021-02-19

## 2021-02-10 RX ORDER — CEFAZOLIN SODIUM 2 G/50ML
2 SOLUTION INTRAVENOUS
Status: CANCELLED | OUTPATIENT
Start: 2021-02-10

## 2021-02-10 RX ORDER — ASPIRIN 81 MG/1
81 TABLET ORAL 2 TIMES DAILY
Qty: 28 TABLET | Refills: 0 | Status: SHIPPED | OUTPATIENT
Start: 2021-02-10 | End: 2021-02-26

## 2021-02-10 RX ORDER — OXYCODONE HYDROCHLORIDE 5 MG/1
5 TABLET ORAL EVERY 6 HOURS PRN
Qty: 28 TABLET | Refills: 0 | Status: SHIPPED | OUTPATIENT
Start: 2021-02-10 | End: 2021-02-19

## 2021-02-10 RX ORDER — SODIUM CHLORIDE 9 MG/ML
INJECTION, SOLUTION INTRAVENOUS CONTINUOUS
Status: CANCELLED | OUTPATIENT
Start: 2021-02-10

## 2021-02-10 RX ORDER — MUPIROCIN 20 MG/G
OINTMENT TOPICAL
Status: CANCELLED | OUTPATIENT
Start: 2021-02-10

## 2021-02-11 ENCOUNTER — PATIENT MESSAGE (OUTPATIENT)
Dept: SURGERY | Facility: HOSPITAL | Age: 51
End: 2021-02-11

## 2021-02-11 ENCOUNTER — TELEPHONE (OUTPATIENT)
Dept: SPORTS MEDICINE | Facility: CLINIC | Age: 51
End: 2021-02-11

## 2021-02-11 ENCOUNTER — ANESTHESIA EVENT (OUTPATIENT)
Dept: SURGERY | Facility: HOSPITAL | Age: 51
End: 2021-02-11
Payer: MEDICARE

## 2021-02-12 ENCOUNTER — HOSPITAL ENCOUNTER (OUTPATIENT)
Facility: HOSPITAL | Age: 51
Discharge: HOME | End: 2021-02-12
Attending: ORTHOPAEDIC SURGERY | Admitting: ORTHOPAEDIC SURGERY
Payer: MEDICARE

## 2021-02-12 ENCOUNTER — ANESTHESIA (OUTPATIENT)
Dept: SURGERY | Facility: HOSPITAL | Age: 51
End: 2021-02-12
Payer: MEDICARE

## 2021-02-12 VITALS
DIASTOLIC BLOOD PRESSURE: 67 MMHG | SYSTOLIC BLOOD PRESSURE: 137 MMHG | OXYGEN SATURATION: 99 % | BODY MASS INDEX: 30.8 KG/M2 | RESPIRATION RATE: 13 BRPM | WEIGHT: 220 LBS | HEART RATE: 55 BPM | TEMPERATURE: 98 F | HEIGHT: 71 IN

## 2021-02-12 DIAGNOSIS — M23.201 OLD TEAR OF LATERAL MENISCUS OF LEFT KNEE, UNSPECIFIED TEAR TYPE: ICD-10-CM

## 2021-02-12 PROCEDURE — 36000710: Performed by: ORTHOPAEDIC SURGERY

## 2021-02-12 PROCEDURE — 25000003 PHARM REV CODE 250: Performed by: ANESTHESIOLOGY

## 2021-02-12 PROCEDURE — 37000009 HC ANESTHESIA EA ADD 15 MINS: Performed by: ORTHOPAEDIC SURGERY

## 2021-02-12 PROCEDURE — D9220A PRA ANESTHESIA: Mod: ANES,,, | Performed by: ANESTHESIOLOGY

## 2021-02-12 PROCEDURE — 63600175 PHARM REV CODE 636 W HCPCS: Performed by: ANESTHESIOLOGY

## 2021-02-12 PROCEDURE — 29881 PR KNEE SCOPE SINGLE MENISECECTOMY: ICD-10-PCS | Mod: 51,LT,, | Performed by: ORTHOPAEDIC SURGERY

## 2021-02-12 PROCEDURE — 36000711: Performed by: ORTHOPAEDIC SURGERY

## 2021-02-12 PROCEDURE — 71000015 HC POSTOP RECOV 1ST HR: Performed by: ORTHOPAEDIC SURGERY

## 2021-02-12 PROCEDURE — 25000003 PHARM REV CODE 250: Performed by: NURSE ANESTHETIST, CERTIFIED REGISTERED

## 2021-02-12 PROCEDURE — 71000039 HC RECOVERY, EACH ADD'L HOUR: Performed by: ORTHOPAEDIC SURGERY

## 2021-02-12 PROCEDURE — 25000003 PHARM REV CODE 250: Performed by: STUDENT IN AN ORGANIZED HEALTH CARE EDUCATION/TRAINING PROGRAM

## 2021-02-12 PROCEDURE — 64448 NJX AA&/STRD FEM NRV NFS IMG: CPT | Mod: 59,LT,, | Performed by: ANESTHESIOLOGY

## 2021-02-12 PROCEDURE — 37000008 HC ANESTHESIA 1ST 15 MINUTES: Performed by: ORTHOPAEDIC SURGERY

## 2021-02-12 PROCEDURE — D9220A PRA ANESTHESIA: Mod: CRNA,,, | Performed by: NURSE ANESTHETIST, CERTIFIED REGISTERED

## 2021-02-12 PROCEDURE — 25000003 PHARM REV CODE 250: Performed by: ORTHOPAEDIC SURGERY

## 2021-02-12 PROCEDURE — 71000016 HC POSTOP RECOV ADDL HR: Performed by: ORTHOPAEDIC SURGERY

## 2021-02-12 PROCEDURE — 25000003 PHARM REV CODE 250: Performed by: PHYSICIAN ASSISTANT

## 2021-02-12 PROCEDURE — 29881 ARTHRS KNE SRG MNISECTMY M/L: CPT | Mod: 51,LT,, | Performed by: ORTHOPAEDIC SURGERY

## 2021-02-12 PROCEDURE — 63600175 PHARM REV CODE 636 W HCPCS: Performed by: NURSE ANESTHETIST, CERTIFIED REGISTERED

## 2021-02-12 PROCEDURE — C1889 IMPLANT/INSERT DEVICE, NOC: HCPCS | Performed by: ORTHOPAEDIC SURGERY

## 2021-02-12 PROCEDURE — 63600175 PHARM REV CODE 636 W HCPCS: Performed by: ORTHOPAEDIC SURGERY

## 2021-02-12 PROCEDURE — D9220A PRA ANESTHESIA: ICD-10-PCS | Mod: ANES,,, | Performed by: ANESTHESIOLOGY

## 2021-02-12 PROCEDURE — 71000033 HC RECOVERY, INTIAL HOUR: Performed by: ORTHOPAEDIC SURGERY

## 2021-02-12 PROCEDURE — 99900035 HC TECH TIME PER 15 MIN (STAT)

## 2021-02-12 PROCEDURE — D9220A PRA ANESTHESIA: ICD-10-PCS | Mod: CRNA,,, | Performed by: NURSE ANESTHETIST, CERTIFIED REGISTERED

## 2021-02-12 PROCEDURE — 63600175 PHARM REV CODE 636 W HCPCS: Performed by: STUDENT IN AN ORGANIZED HEALTH CARE EDUCATION/TRAINING PROGRAM

## 2021-02-12 PROCEDURE — 27201423 OPTIME MED/SURG SUP & DEVICES STERILE SUPPLY: Performed by: ORTHOPAEDIC SURGERY

## 2021-02-12 PROCEDURE — 76942 PR U/S GUIDANCE FOR NEEDLE GUIDANCE: ICD-10-PCS | Mod: 26,,, | Performed by: ANESTHESIOLOGY

## 2021-02-12 PROCEDURE — 94761 N-INVAS EAR/PLS OXIMETRY MLT: CPT

## 2021-02-12 PROCEDURE — 29855 TIBIAL ARTHROSCOPY/SURGERY: CPT | Mod: LT,,, | Performed by: ORTHOPAEDIC SURGERY

## 2021-02-12 PROCEDURE — 76942 ECHO GUIDE FOR BIOPSY: CPT | Mod: 26,,, | Performed by: ANESTHESIOLOGY

## 2021-02-12 PROCEDURE — 76942 ECHO GUIDE FOR BIOPSY: CPT | Performed by: ANESTHESIOLOGY

## 2021-02-12 PROCEDURE — 29855 PR TIBIAL SCOPE/SURG/FX AID,UNICONDYLR: ICD-10-PCS | Mod: LT,,, | Performed by: ORTHOPAEDIC SURGERY

## 2021-02-12 PROCEDURE — 64448 NJX AA&/STRD FEM NRV NFS IMG: CPT | Performed by: ANESTHESIOLOGY

## 2021-02-12 PROCEDURE — 64448 PR NERVE BLOCK INJ, ANES/STEROID, FEMORAL, CONT INFUSION, INCL IMAG GUIDANCE: ICD-10-PCS | Mod: 59,LT,, | Performed by: ANESTHESIOLOGY

## 2021-02-12 DEVICE — KIT KNEE SCP ACCUPRT 11G 120MM: Type: IMPLANTABLE DEVICE | Site: KNEE | Status: FUNCTIONAL

## 2021-02-12 RX ORDER — ONDANSETRON 2 MG/ML
INJECTION INTRAMUSCULAR; INTRAVENOUS
Status: DISCONTINUED | OUTPATIENT
Start: 2021-02-12 | End: 2021-02-12

## 2021-02-12 RX ORDER — LIDOCAINE HCL/PF 100 MG/5ML
SYRINGE (ML) INTRAVENOUS
Status: DISCONTINUED | OUTPATIENT
Start: 2021-02-12 | End: 2021-02-12

## 2021-02-12 RX ORDER — LORAZEPAM 2 MG/ML
0.25 INJECTION INTRAMUSCULAR ONCE AS NEEDED
Status: DISCONTINUED | OUTPATIENT
Start: 2021-02-12 | End: 2021-02-12 | Stop reason: HOSPADM

## 2021-02-12 RX ORDER — ACETAMINOPHEN 500 MG
1000 TABLET ORAL ONCE
Status: COMPLETED | OUTPATIENT
Start: 2021-02-12 | End: 2021-02-12

## 2021-02-12 RX ORDER — CELECOXIB 200 MG/1
400 CAPSULE ORAL ONCE
Status: COMPLETED | OUTPATIENT
Start: 2021-02-12 | End: 2021-02-12

## 2021-02-12 RX ORDER — ROPIVACAINE HYDROCHLORIDE 2 MG/ML
INJECTION, SOLUTION EPIDURAL; INFILTRATION; PERINEURAL CONTINUOUS
Status: DISCONTINUED | OUTPATIENT
Start: 2021-02-12 | End: 2021-02-12 | Stop reason: HOSPADM

## 2021-02-12 RX ORDER — PROPOFOL 10 MG/ML
VIAL (ML) INTRAVENOUS
Status: DISCONTINUED | OUTPATIENT
Start: 2021-02-12 | End: 2021-02-12

## 2021-02-12 RX ORDER — PHENYLEPHRINE HYDROCHLORIDE 10 MG/ML
INJECTION INTRAVENOUS
Status: DISCONTINUED | OUTPATIENT
Start: 2021-02-12 | End: 2021-02-12

## 2021-02-12 RX ORDER — MIDAZOLAM HYDROCHLORIDE 1 MG/ML
0.5 INJECTION INTRAMUSCULAR; INTRAVENOUS
Status: DISPENSED | OUTPATIENT
Start: 2021-02-12

## 2021-02-12 RX ORDER — MUPIROCIN 20 MG/G
OINTMENT TOPICAL
Status: DISCONTINUED | OUTPATIENT
Start: 2021-02-12 | End: 2021-02-12 | Stop reason: HOSPADM

## 2021-02-12 RX ORDER — ROCURONIUM BROMIDE 10 MG/ML
INJECTION, SOLUTION INTRAVENOUS
Status: DISCONTINUED | OUTPATIENT
Start: 2021-02-12 | End: 2021-02-12

## 2021-02-12 RX ORDER — CEFAZOLIN SODIUM 1 G/3ML
2 INJECTION, POWDER, FOR SOLUTION INTRAMUSCULAR; INTRAVENOUS
Status: DISCONTINUED | OUTPATIENT
Start: 2021-02-12 | End: 2021-02-12 | Stop reason: HOSPADM

## 2021-02-12 RX ORDER — EPINEPHRINE 1 MG/ML
INJECTION INTRAMUSCULAR; INTRAVENOUS; SUBCUTANEOUS
Status: DISCONTINUED | OUTPATIENT
Start: 2021-02-12 | End: 2021-02-12 | Stop reason: HOSPADM

## 2021-02-12 RX ORDER — SUCCINYLCHOLINE CHLORIDE 20 MG/ML
INJECTION INTRAMUSCULAR; INTRAVENOUS
Status: DISCONTINUED | OUTPATIENT
Start: 2021-02-12 | End: 2021-02-12

## 2021-02-12 RX ORDER — OXYCODONE HYDROCHLORIDE 5 MG/1
5 TABLET ORAL ONCE
Status: COMPLETED | OUTPATIENT
Start: 2021-02-12 | End: 2021-02-12

## 2021-02-12 RX ORDER — FAMOTIDINE 10 MG/ML
INJECTION INTRAVENOUS
Status: DISCONTINUED | OUTPATIENT
Start: 2021-02-12 | End: 2021-02-12

## 2021-02-12 RX ORDER — NEOSTIGMINE METHYLSULFATE 1 MG/ML
INJECTION, SOLUTION INTRAVENOUS
Status: DISCONTINUED | OUTPATIENT
Start: 2021-02-12 | End: 2021-02-12

## 2021-02-12 RX ORDER — FENTANYL CITRATE 50 UG/ML
INJECTION, SOLUTION INTRAMUSCULAR; INTRAVENOUS
Status: DISCONTINUED | OUTPATIENT
Start: 2021-02-12 | End: 2021-02-12

## 2021-02-12 RX ORDER — DEXMEDETOMIDINE HYDROCHLORIDE 100 UG/ML
INJECTION, SOLUTION INTRAVENOUS
Status: DISCONTINUED | OUTPATIENT
Start: 2021-02-12 | End: 2021-02-12

## 2021-02-12 RX ORDER — FENTANYL CITRATE 50 UG/ML
25 INJECTION, SOLUTION INTRAMUSCULAR; INTRAVENOUS EVERY 5 MIN PRN
Status: DISPENSED | OUTPATIENT
Start: 2021-02-12

## 2021-02-12 RX ORDER — FENTANYL CITRATE 50 UG/ML
25 INJECTION, SOLUTION INTRAMUSCULAR; INTRAVENOUS EVERY 5 MIN PRN
Status: COMPLETED | OUTPATIENT
Start: 2021-02-12 | End: 2021-02-12

## 2021-02-12 RX ORDER — DEXAMETHASONE SODIUM PHOSPHATE 100 MG/10ML
INJECTION INTRAMUSCULAR; INTRAVENOUS
Status: DISCONTINUED | OUTPATIENT
Start: 2021-02-12 | End: 2021-02-12

## 2021-02-12 RX ORDER — HYDROMORPHONE HYDROCHLORIDE 1 MG/ML
0.2 INJECTION, SOLUTION INTRAMUSCULAR; INTRAVENOUS; SUBCUTANEOUS EVERY 5 MIN PRN
Status: DISCONTINUED | OUTPATIENT
Start: 2021-02-12 | End: 2021-02-12 | Stop reason: HOSPADM

## 2021-02-12 RX ORDER — DIPHENHYDRAMINE HYDROCHLORIDE 50 MG/ML
25 INJECTION INTRAMUSCULAR; INTRAVENOUS EVERY 6 HOURS PRN
Status: DISCONTINUED | OUTPATIENT
Start: 2021-02-12 | End: 2021-02-12 | Stop reason: HOSPADM

## 2021-02-12 RX ORDER — SODIUM CHLORIDE 9 MG/ML
INJECTION, SOLUTION INTRAVENOUS CONTINUOUS
Status: DISCONTINUED | OUTPATIENT
Start: 2021-02-12 | End: 2021-02-12 | Stop reason: HOSPADM

## 2021-02-12 RX ORDER — METHOCARBAMOL 750 MG/1
750 TABLET, FILM COATED ORAL ONCE
Status: COMPLETED | OUTPATIENT
Start: 2021-02-12 | End: 2021-02-12

## 2021-02-12 RX ORDER — ROPIVACAINE HYDROCHLORIDE 5 MG/ML
INJECTION, SOLUTION EPIDURAL; INFILTRATION; PERINEURAL
Status: COMPLETED | OUTPATIENT
Start: 2021-02-12 | End: 2021-02-12

## 2021-02-12 RX ADMIN — PROPOFOL 250 MG: 10 INJECTION, EMULSION INTRAVENOUS at 11:02

## 2021-02-12 RX ADMIN — FENTANYL CITRATE 25 MCG: 50 INJECTION INTRAMUSCULAR; INTRAVENOUS at 01:02

## 2021-02-12 RX ADMIN — OXYCODONE 5 MG: 5 TABLET ORAL at 01:02

## 2021-02-12 RX ADMIN — METHOCARBAMOL 750 MG: 750 TABLET ORAL at 01:02

## 2021-02-12 RX ADMIN — ROPIVACAINE HYDROCHLORIDE 7.5 ML: 5 INJECTION, SOLUTION EPIDURAL; INFILTRATION; PERINEURAL at 10:02

## 2021-02-12 RX ADMIN — FENTANYL CITRATE 50 MCG: 50 INJECTION, SOLUTION INTRAMUSCULAR; INTRAVENOUS at 11:02

## 2021-02-12 RX ADMIN — GLYCOPYRROLATE 0.2 MG: 0.2 INJECTION, SOLUTION INTRAMUSCULAR; INTRAVITREAL at 11:02

## 2021-02-12 RX ADMIN — ONDANSETRON 4 MG: 2 INJECTION, SOLUTION INTRAMUSCULAR; INTRAVENOUS at 10:02

## 2021-02-12 RX ADMIN — SUCCINYLCHOLINE CHLORIDE 100 MG: 20 INJECTION, SOLUTION INTRAMUSCULAR; INTRAVENOUS at 11:02

## 2021-02-12 RX ADMIN — DEXAMETHASONE SODIUM PHOSPHATE 8 MG: 10 INJECTION INTRAMUSCULAR; INTRAVENOUS at 11:02

## 2021-02-12 RX ADMIN — SODIUM CHLORIDE, SODIUM GLUCONATE, SODIUM ACETATE, POTASSIUM CHLORIDE, MAGNESIUM CHLORIDE, SODIUM PHOSPHATE, DIBASIC, AND POTASSIUM PHOSPHATE: .53; .5; .37; .037; .03; .012; .00082 INJECTION, SOLUTION INTRAVENOUS at 11:02

## 2021-02-12 RX ADMIN — DEXTROSE MONOHYDRATE 2 G: 50 INJECTION, SOLUTION INTRAVENOUS at 11:02

## 2021-02-12 RX ADMIN — ROCURONIUM BROMIDE 20 MG: 10 INJECTION, SOLUTION INTRAVENOUS at 11:02

## 2021-02-12 RX ADMIN — FAMOTIDINE 20 MG: 10 INJECTION, SOLUTION INTRAVENOUS at 11:02

## 2021-02-12 RX ADMIN — GLYCOPYRROLATE 0.4 MG: 0.2 INJECTION, SOLUTION INTRAMUSCULAR; INTRAVITREAL at 12:02

## 2021-02-12 RX ADMIN — DEXMEDETOMIDINE HYDROCHLORIDE 8 MCG: 100 INJECTION, SOLUTION, CONCENTRATE INTRAVENOUS at 11:02

## 2021-02-12 RX ADMIN — NEOSTIGMINE METHYLSULFATE 3.5 MG: 1 INJECTION INTRAVENOUS at 12:02

## 2021-02-12 RX ADMIN — CELECOXIB 400 MG: 200 CAPSULE ORAL at 10:02

## 2021-02-12 RX ADMIN — MUPIROCIN: 20 OINTMENT TOPICAL at 10:02

## 2021-02-12 RX ADMIN — ACETAMINOPHEN 1000 MG: 500 TABLET ORAL at 10:02

## 2021-02-12 RX ADMIN — Medication: at 01:02

## 2021-02-12 RX ADMIN — SODIUM CHLORIDE: 0.9 INJECTION, SOLUTION INTRAVENOUS at 10:02

## 2021-02-12 RX ADMIN — PHENYLEPHRINE HYDROCHLORIDE 100 MCG: 10 INJECTION INTRAVENOUS at 12:02

## 2021-02-12 RX ADMIN — MIDAZOLAM HYDROCHLORIDE 2 MG: 1 INJECTION, SOLUTION INTRAMUSCULAR; INTRAVENOUS at 10:02

## 2021-02-12 RX ADMIN — LIDOCAINE HYDROCHLORIDE 100 MG: 20 INJECTION, SOLUTION INTRAVENOUS at 11:02

## 2021-02-13 ENCOUNTER — PATIENT MESSAGE (OUTPATIENT)
Dept: ADMINISTRATIVE | Facility: OTHER | Age: 51
End: 2021-02-13

## 2021-02-14 ENCOUNTER — PATIENT MESSAGE (OUTPATIENT)
Dept: ADMINISTRATIVE | Facility: OTHER | Age: 51
End: 2021-02-14

## 2021-02-17 PROBLEM — M25.662 DECREASED RANGE OF MOTION OF LEFT KNEE: Status: ACTIVE | Noted: 2021-02-17

## 2021-02-17 PROBLEM — R26.89 POOR BALANCE: Status: ACTIVE | Noted: 2021-02-17

## 2021-03-02 ENCOUNTER — HOSPITAL ENCOUNTER (OUTPATIENT)
Dept: RADIOLOGY | Facility: HOSPITAL | Age: 51
Discharge: HOME | End: 2021-03-02
Attending: PHYSICIAN ASSISTANT
Payer: MEDICARE

## 2021-03-02 ENCOUNTER — OFFICE VISIT (OUTPATIENT)
Dept: SPORTS MEDICINE | Facility: CLINIC | Age: 51
End: 2021-03-02
Payer: MEDICARE

## 2021-03-02 VITALS
BODY MASS INDEX: 32.2 KG/M2 | HEIGHT: 71 IN | WEIGHT: 230 LBS | SYSTOLIC BLOOD PRESSURE: 122 MMHG | HEART RATE: 70 BPM | DIASTOLIC BLOOD PRESSURE: 70 MMHG

## 2021-03-02 DIAGNOSIS — Z09 SURGERY FOLLOW-UP EXAMINATION: Primary | ICD-10-CM

## 2021-03-02 DIAGNOSIS — M25.562 LEFT KNEE PAIN, UNSPECIFIED CHRONICITY: ICD-10-CM

## 2021-03-02 PROCEDURE — 99999 PR PBB SHADOW E&M-EST. PATIENT-LVL IV: ICD-10-PCS | Mod: PBBFAC,,, | Performed by: PHYSICIAN ASSISTANT

## 2021-03-02 PROCEDURE — 73562 X-RAY EXAM OF KNEE 3: CPT | Mod: 26,RT,, | Performed by: RADIOLOGY

## 2021-03-02 PROCEDURE — 99024 PR POST-OP FOLLOW-UP VISIT: ICD-10-PCS | Mod: S$GLB,,, | Performed by: PHYSICIAN ASSISTANT

## 2021-03-02 PROCEDURE — 1126F PR PAIN SEVERITY QUANTIFIED, NO PAIN PRESENT: ICD-10-PCS | Mod: S$GLB,,, | Performed by: PHYSICIAN ASSISTANT

## 2021-03-02 PROCEDURE — 99999 PR PBB SHADOW E&M-EST. PATIENT-LVL IV: CPT | Mod: PBBFAC,,, | Performed by: PHYSICIAN ASSISTANT

## 2021-03-02 PROCEDURE — 73562 XR KNEE ORTHO LEFT WITH FLEXION: ICD-10-PCS | Mod: 26,RT,, | Performed by: RADIOLOGY

## 2021-03-02 PROCEDURE — 73564 X-RAY EXAM KNEE 4 OR MORE: CPT | Mod: TC,LT

## 2021-03-02 PROCEDURE — 20610 PR DRAIN/INJECT LARGE JOINT/BURSA: ICD-10-PCS | Mod: 58,LT,S$GLB, | Performed by: PHYSICIAN ASSISTANT

## 2021-03-02 PROCEDURE — 1126F AMNT PAIN NOTED NONE PRSNT: CPT | Mod: S$GLB,,, | Performed by: PHYSICIAN ASSISTANT

## 2021-03-02 PROCEDURE — 73564 XR KNEE ORTHO LEFT WITH FLEXION: ICD-10-PCS | Mod: 26,LT,, | Performed by: RADIOLOGY

## 2021-03-02 PROCEDURE — 3008F BODY MASS INDEX DOCD: CPT | Mod: CPTII,S$GLB,, | Performed by: PHYSICIAN ASSISTANT

## 2021-03-02 PROCEDURE — 3008F PR BODY MASS INDEX (BMI) DOCUMENTED: ICD-10-PCS | Mod: CPTII,S$GLB,, | Performed by: PHYSICIAN ASSISTANT

## 2021-03-02 PROCEDURE — 99024 POSTOP FOLLOW-UP VISIT: CPT | Mod: S$GLB,,, | Performed by: PHYSICIAN ASSISTANT

## 2021-03-02 PROCEDURE — 73564 X-RAY EXAM KNEE 4 OR MORE: CPT | Mod: 26,LT,, | Performed by: RADIOLOGY

## 2021-03-02 PROCEDURE — 20610 DRAIN/INJ JOINT/BURSA W/O US: CPT | Mod: 58,LT,S$GLB, | Performed by: PHYSICIAN ASSISTANT

## 2021-03-04 ENCOUNTER — PATIENT MESSAGE (OUTPATIENT)
Dept: SPORTS MEDICINE | Facility: CLINIC | Age: 51
End: 2021-03-04

## 2021-03-05 ENCOUNTER — PATIENT MESSAGE (OUTPATIENT)
Dept: SPORTS MEDICINE | Facility: CLINIC | Age: 51
End: 2021-03-05

## 2021-03-05 DIAGNOSIS — Z09 SURGERY FOLLOW-UP EXAMINATION: Primary | ICD-10-CM

## 2021-03-05 RX ORDER — NAPROXEN 500 MG/1
500 TABLET ORAL 2 TIMES DAILY
Qty: 30 TABLET | Refills: 1 | Status: SHIPPED | OUTPATIENT
Start: 2021-03-05 | End: 2022-03-29

## 2021-03-11 ENCOUNTER — PATIENT MESSAGE (OUTPATIENT)
Dept: SPORTS MEDICINE | Facility: CLINIC | Age: 51
End: 2021-03-11

## 2021-03-12 ENCOUNTER — IMMUNIZATION (OUTPATIENT)
Dept: OBSTETRICS AND GYNECOLOGY | Facility: CLINIC | Age: 51
End: 2021-03-12
Payer: MEDICARE

## 2021-03-12 DIAGNOSIS — Z23 NEED FOR VACCINATION: Primary | ICD-10-CM

## 2021-03-12 PROCEDURE — 0031A COVID-19,VECTOR-NR,RS-AD26,PF,0.5 ML DOSE VACCINE (JANSSEN): CPT | Mod: PBBFAC | Performed by: ANESTHESIOLOGY

## 2021-03-15 ENCOUNTER — TELEPHONE (OUTPATIENT)
Dept: SPORTS MEDICINE | Facility: CLINIC | Age: 51
End: 2021-03-15

## 2021-03-17 ENCOUNTER — OFFICE VISIT (OUTPATIENT)
Dept: SPORTS MEDICINE | Facility: CLINIC | Age: 51
End: 2021-03-17
Payer: MEDICARE

## 2021-03-17 VITALS
HEART RATE: 68 BPM | HEIGHT: 71 IN | SYSTOLIC BLOOD PRESSURE: 122 MMHG | BODY MASS INDEX: 31.08 KG/M2 | WEIGHT: 222 LBS | DIASTOLIC BLOOD PRESSURE: 65 MMHG

## 2021-03-17 DIAGNOSIS — Z09 SURGERY FOLLOW-UP EXAMINATION: Primary | ICD-10-CM

## 2021-03-17 PROCEDURE — 99999 PR PBB SHADOW E&M-EST. PATIENT-LVL III: CPT | Mod: PBBFAC,,, | Performed by: PHYSICIAN ASSISTANT

## 2021-03-17 PROCEDURE — 20610 DRAIN/INJ JOINT/BURSA W/O US: CPT | Mod: 58,LT,S$GLB, | Performed by: PHYSICIAN ASSISTANT

## 2021-03-17 PROCEDURE — 3008F PR BODY MASS INDEX (BMI) DOCUMENTED: ICD-10-PCS | Mod: CPTII,S$GLB,, | Performed by: PHYSICIAN ASSISTANT

## 2021-03-17 PROCEDURE — 99024 POSTOP FOLLOW-UP VISIT: CPT | Mod: S$GLB,,, | Performed by: PHYSICIAN ASSISTANT

## 2021-03-17 PROCEDURE — 99999 PR PBB SHADOW E&M-EST. PATIENT-LVL III: ICD-10-PCS | Mod: PBBFAC,,, | Performed by: PHYSICIAN ASSISTANT

## 2021-03-17 PROCEDURE — 20610 PR DRAIN/INJECT LARGE JOINT/BURSA: ICD-10-PCS | Mod: 58,LT,S$GLB, | Performed by: PHYSICIAN ASSISTANT

## 2021-03-17 PROCEDURE — 3008F BODY MASS INDEX DOCD: CPT | Mod: CPTII,S$GLB,, | Performed by: PHYSICIAN ASSISTANT

## 2021-03-17 PROCEDURE — 1126F PR PAIN SEVERITY QUANTIFIED, NO PAIN PRESENT: ICD-10-PCS | Mod: S$GLB,,, | Performed by: PHYSICIAN ASSISTANT

## 2021-03-17 PROCEDURE — 99024 PR POST-OP FOLLOW-UP VISIT: ICD-10-PCS | Mod: S$GLB,,, | Performed by: PHYSICIAN ASSISTANT

## 2021-03-17 PROCEDURE — 1126F AMNT PAIN NOTED NONE PRSNT: CPT | Mod: S$GLB,,, | Performed by: PHYSICIAN ASSISTANT

## 2021-03-30 ENCOUNTER — OFFICE VISIT (OUTPATIENT)
Dept: SPORTS MEDICINE | Facility: CLINIC | Age: 51
End: 2021-03-30
Payer: MEDICARE

## 2021-03-30 VITALS
DIASTOLIC BLOOD PRESSURE: 70 MMHG | HEART RATE: 73 BPM | BODY MASS INDEX: 31.08 KG/M2 | HEIGHT: 71 IN | WEIGHT: 222 LBS | SYSTOLIC BLOOD PRESSURE: 119 MMHG

## 2021-03-30 DIAGNOSIS — Z09 SURGERY FOLLOW-UP EXAMINATION: Primary | ICD-10-CM

## 2021-03-30 DIAGNOSIS — M25.462 EFFUSION OF LEFT KNEE JOINT: ICD-10-CM

## 2021-03-30 PROCEDURE — 20610 LARGE JOINT ASPIRATION/INJECTION: L KNEE: ICD-10-PCS | Mod: 58,LT,S$GLB, | Performed by: ORTHOPAEDIC SURGERY

## 2021-03-30 PROCEDURE — 3008F PR BODY MASS INDEX (BMI) DOCUMENTED: ICD-10-PCS | Mod: CPTII,S$GLB,, | Performed by: ORTHOPAEDIC SURGERY

## 2021-03-30 PROCEDURE — 99024 POSTOP FOLLOW-UP VISIT: CPT | Mod: S$GLB,,, | Performed by: ORTHOPAEDIC SURGERY

## 2021-03-30 PROCEDURE — 99999 PR PBB SHADOW E&M-EST. PATIENT-LVL III: CPT | Mod: PBBFAC,,, | Performed by: ORTHOPAEDIC SURGERY

## 2021-03-30 PROCEDURE — 1126F PR PAIN SEVERITY QUANTIFIED, NO PAIN PRESENT: ICD-10-PCS | Mod: S$GLB,,, | Performed by: ORTHOPAEDIC SURGERY

## 2021-03-30 PROCEDURE — 20610 DRAIN/INJ JOINT/BURSA W/O US: CPT | Mod: 58,LT,S$GLB, | Performed by: ORTHOPAEDIC SURGERY

## 2021-03-30 PROCEDURE — 3008F BODY MASS INDEX DOCD: CPT | Mod: CPTII,S$GLB,, | Performed by: ORTHOPAEDIC SURGERY

## 2021-03-30 PROCEDURE — 99024 PR POST-OP FOLLOW-UP VISIT: ICD-10-PCS | Mod: S$GLB,,, | Performed by: ORTHOPAEDIC SURGERY

## 2021-03-30 PROCEDURE — 1126F AMNT PAIN NOTED NONE PRSNT: CPT | Mod: S$GLB,,, | Performed by: ORTHOPAEDIC SURGERY

## 2021-03-30 PROCEDURE — 99999 PR PBB SHADOW E&M-EST. PATIENT-LVL III: ICD-10-PCS | Mod: PBBFAC,,, | Performed by: ORTHOPAEDIC SURGERY

## 2021-04-08 ENCOUNTER — PATIENT MESSAGE (OUTPATIENT)
Dept: ADMINISTRATIVE | Facility: OTHER | Age: 51
End: 2021-04-08

## 2021-04-08 ENCOUNTER — PATIENT MESSAGE (OUTPATIENT)
Dept: SPORTS MEDICINE | Facility: CLINIC | Age: 51
End: 2021-04-08

## 2021-04-09 ENCOUNTER — PATIENT MESSAGE (OUTPATIENT)
Dept: SPORTS MEDICINE | Facility: CLINIC | Age: 51
End: 2021-04-09

## 2021-04-12 ENCOUNTER — PATIENT MESSAGE (OUTPATIENT)
Dept: SPORTS MEDICINE | Facility: CLINIC | Age: 51
End: 2021-04-12

## 2021-04-13 ENCOUNTER — PATIENT MESSAGE (OUTPATIENT)
Dept: ADMINISTRATIVE | Facility: OTHER | Age: 51
End: 2021-04-13

## 2021-04-13 ENCOUNTER — PATIENT MESSAGE (OUTPATIENT)
Dept: CARDIOLOGY | Facility: CLINIC | Age: 51
End: 2021-04-13

## 2021-04-14 ENCOUNTER — PATIENT MESSAGE (OUTPATIENT)
Dept: CARDIOLOGY | Facility: CLINIC | Age: 51
End: 2021-04-14

## 2021-04-14 ENCOUNTER — OFFICE VISIT (OUTPATIENT)
Dept: SPORTS MEDICINE | Facility: CLINIC | Age: 51
End: 2021-04-14
Payer: MEDICARE

## 2021-04-14 VITALS
DIASTOLIC BLOOD PRESSURE: 68 MMHG | SYSTOLIC BLOOD PRESSURE: 107 MMHG | WEIGHT: 225 LBS | BODY MASS INDEX: 31.5 KG/M2 | HEIGHT: 71 IN | HEART RATE: 58 BPM

## 2021-04-14 DIAGNOSIS — M25.462 EFFUSION OF LEFT KNEE JOINT: ICD-10-CM

## 2021-04-14 DIAGNOSIS — Z09 SURGERY FOLLOW-UP EXAMINATION: Primary | ICD-10-CM

## 2021-04-14 PROCEDURE — 3008F PR BODY MASS INDEX (BMI) DOCUMENTED: ICD-10-PCS | Mod: CPTII,S$GLB,, | Performed by: PHYSICIAN ASSISTANT

## 2021-04-14 PROCEDURE — 1126F PR PAIN SEVERITY QUANTIFIED, NO PAIN PRESENT: ICD-10-PCS | Mod: S$GLB,,, | Performed by: PHYSICIAN ASSISTANT

## 2021-04-14 PROCEDURE — 99024 PR POST-OP FOLLOW-UP VISIT: ICD-10-PCS | Mod: S$GLB,,, | Performed by: PHYSICIAN ASSISTANT

## 2021-04-14 PROCEDURE — 1126F AMNT PAIN NOTED NONE PRSNT: CPT | Mod: S$GLB,,, | Performed by: PHYSICIAN ASSISTANT

## 2021-04-14 PROCEDURE — 99999 PR PBB SHADOW E&M-EST. PATIENT-LVL IV: ICD-10-PCS | Mod: PBBFAC,,, | Performed by: PHYSICIAN ASSISTANT

## 2021-04-14 PROCEDURE — 99999 PR PBB SHADOW E&M-EST. PATIENT-LVL IV: CPT | Mod: PBBFAC,,, | Performed by: PHYSICIAN ASSISTANT

## 2021-04-14 PROCEDURE — 99024 POSTOP FOLLOW-UP VISIT: CPT | Mod: S$GLB,,, | Performed by: PHYSICIAN ASSISTANT

## 2021-04-14 PROCEDURE — 20610 DRAIN/INJ JOINT/BURSA W/O US: CPT | Mod: 58,LT,S$GLB, | Performed by: PHYSICIAN ASSISTANT

## 2021-04-14 PROCEDURE — 20610 PR DRAIN/INJECT LARGE JOINT/BURSA: ICD-10-PCS | Mod: 58,LT,S$GLB, | Performed by: PHYSICIAN ASSISTANT

## 2021-04-14 PROCEDURE — 3008F BODY MASS INDEX DOCD: CPT | Mod: CPTII,S$GLB,, | Performed by: PHYSICIAN ASSISTANT

## 2021-04-19 ENCOUNTER — TELEPHONE (OUTPATIENT)
Dept: SPORTS MEDICINE | Facility: CLINIC | Age: 51
End: 2021-04-19

## 2021-04-22 ENCOUNTER — OFFICE VISIT (OUTPATIENT)
Dept: SPORTS MEDICINE | Facility: CLINIC | Age: 51
End: 2021-04-22
Payer: MEDICARE

## 2021-04-22 ENCOUNTER — HOSPITAL ENCOUNTER (OUTPATIENT)
Dept: RADIOLOGY | Facility: HOSPITAL | Age: 51
Discharge: HOME | End: 2021-04-22
Attending: ORTHOPAEDIC SURGERY
Payer: MEDICARE

## 2021-04-22 VITALS
HEART RATE: 80 BPM | SYSTOLIC BLOOD PRESSURE: 113 MMHG | BODY MASS INDEX: 31.5 KG/M2 | DIASTOLIC BLOOD PRESSURE: 72 MMHG | HEIGHT: 71 IN | WEIGHT: 225 LBS

## 2021-04-22 DIAGNOSIS — M17.12 PRIMARY OSTEOARTHRITIS OF LEFT KNEE: Primary | ICD-10-CM

## 2021-04-22 DIAGNOSIS — Z01.818 PRE-OP TESTING: ICD-10-CM

## 2021-04-22 DIAGNOSIS — M17.12 PRIMARY OSTEOARTHRITIS OF LEFT KNEE: ICD-10-CM

## 2021-04-22 PROCEDURE — 77073 BONE LENGTH STUDIES: CPT | Mod: TC

## 2021-04-22 PROCEDURE — 99999 PR PBB SHADOW E&M-EST. PATIENT-LVL IV: CPT | Mod: PBBFAC,,, | Performed by: ORTHOPAEDIC SURGERY

## 2021-04-22 PROCEDURE — 99214 OFFICE O/P EST MOD 30 MIN: CPT | Mod: 24,S$GLB,, | Performed by: ORTHOPAEDIC SURGERY

## 2021-04-22 PROCEDURE — 1126F PR PAIN SEVERITY QUANTIFIED, NO PAIN PRESENT: ICD-10-PCS | Mod: S$GLB,,, | Performed by: ORTHOPAEDIC SURGERY

## 2021-04-22 PROCEDURE — 99999 PR PBB SHADOW E&M-EST. PATIENT-LVL IV: ICD-10-PCS | Mod: PBBFAC,,, | Performed by: ORTHOPAEDIC SURGERY

## 2021-04-22 PROCEDURE — 3008F BODY MASS INDEX DOCD: CPT | Mod: CPTII,S$GLB,, | Performed by: ORTHOPAEDIC SURGERY

## 2021-04-22 PROCEDURE — 99214 PR OFFICE/OUTPT VISIT, EST, LEVL IV, 30-39 MIN: ICD-10-PCS | Mod: 24,S$GLB,, | Performed by: ORTHOPAEDIC SURGERY

## 2021-04-22 PROCEDURE — 77073 XR HIP TO ANKLE: ICD-10-PCS | Mod: 26,,, | Performed by: RADIOLOGY

## 2021-04-22 PROCEDURE — 77073 BONE LENGTH STUDIES: CPT | Mod: 26,,, | Performed by: RADIOLOGY

## 2021-04-22 PROCEDURE — 1126F AMNT PAIN NOTED NONE PRSNT: CPT | Mod: S$GLB,,, | Performed by: ORTHOPAEDIC SURGERY

## 2021-04-22 PROCEDURE — 3008F PR BODY MASS INDEX (BMI) DOCUMENTED: ICD-10-PCS | Mod: CPTII,S$GLB,, | Performed by: ORTHOPAEDIC SURGERY

## 2021-04-23 ENCOUNTER — TELEPHONE (OUTPATIENT)
Dept: CARDIOLOGY | Facility: CLINIC | Age: 51
End: 2021-04-23

## 2021-04-23 DIAGNOSIS — M17.12 PRIMARY OSTEOARTHRITIS OF LEFT KNEE: Primary | ICD-10-CM

## 2021-04-24 ENCOUNTER — PATIENT MESSAGE (OUTPATIENT)
Dept: ADMINISTRATIVE | Facility: OTHER | Age: 51
End: 2021-04-24

## 2021-04-26 DIAGNOSIS — M79.609 PAIN IN EXTREMITY, UNSPECIFIED EXTREMITY: Primary | ICD-10-CM

## 2021-04-27 ENCOUNTER — TELEPHONE (OUTPATIENT)
Dept: PREADMISSION TESTING | Facility: HOSPITAL | Age: 51
End: 2021-04-27

## 2021-04-28 ENCOUNTER — OFFICE VISIT (OUTPATIENT)
Dept: SPORTS MEDICINE | Facility: CLINIC | Age: 51
End: 2021-04-28
Payer: MEDICARE

## 2021-04-28 ENCOUNTER — TELEPHONE (OUTPATIENT)
Dept: SPORTS MEDICINE | Facility: CLINIC | Age: 51
End: 2021-04-28

## 2021-04-28 ENCOUNTER — HOSPITAL ENCOUNTER (OUTPATIENT)
Dept: PREADMISSION TESTING | Facility: HOSPITAL | Age: 51
Discharge: HOME | End: 2021-04-28
Attending: ORTHOPAEDIC SURGERY
Payer: MEDICARE

## 2021-04-28 VITALS
SYSTOLIC BLOOD PRESSURE: 140 MMHG | HEIGHT: 71 IN | WEIGHT: 220 LBS | TEMPERATURE: 98 F | BODY MASS INDEX: 30.8 KG/M2 | HEART RATE: 66 BPM | DIASTOLIC BLOOD PRESSURE: 64 MMHG | OXYGEN SATURATION: 98 % | RESPIRATION RATE: 16 BRPM

## 2021-04-28 VITALS
WEIGHT: 225 LBS | DIASTOLIC BLOOD PRESSURE: 79 MMHG | SYSTOLIC BLOOD PRESSURE: 142 MMHG | HEIGHT: 71 IN | BODY MASS INDEX: 31.5 KG/M2 | HEART RATE: 64 BPM

## 2021-04-28 DIAGNOSIS — M17.12 PRIMARY OSTEOARTHRITIS OF LEFT KNEE: Primary | ICD-10-CM

## 2021-04-28 PROCEDURE — 3008F BODY MASS INDEX DOCD: CPT | Mod: CPTII,S$GLB,, | Performed by: PHYSICIAN ASSISTANT

## 2021-04-28 PROCEDURE — 3008F PR BODY MASS INDEX (BMI) DOCUMENTED: ICD-10-PCS | Mod: CPTII,S$GLB,, | Performed by: PHYSICIAN ASSISTANT

## 2021-04-28 PROCEDURE — 99999 PR PBB SHADOW E&M-EST. PATIENT-LVL IV: CPT | Mod: PBBFAC,,, | Performed by: PHYSICIAN ASSISTANT

## 2021-04-28 PROCEDURE — 99999 PR PBB SHADOW E&M-EST. PATIENT-LVL IV: ICD-10-PCS | Mod: PBBFAC,,, | Performed by: PHYSICIAN ASSISTANT

## 2021-04-28 PROCEDURE — 99499 NO LOS: ICD-10-PCS | Mod: S$GLB,,, | Performed by: PHYSICIAN ASSISTANT

## 2021-04-28 PROCEDURE — 1126F PR PAIN SEVERITY QUANTIFIED, NO PAIN PRESENT: ICD-10-PCS | Mod: S$GLB,,, | Performed by: PHYSICIAN ASSISTANT

## 2021-04-28 PROCEDURE — 99499 UNLISTED E&M SERVICE: CPT | Mod: S$GLB,,, | Performed by: PHYSICIAN ASSISTANT

## 2021-04-28 PROCEDURE — 1126F AMNT PAIN NOTED NONE PRSNT: CPT | Mod: S$GLB,,, | Performed by: PHYSICIAN ASSISTANT

## 2021-04-28 RX ORDER — CELECOXIB 200 MG/1
200 CAPSULE ORAL DAILY
Qty: 30 CAPSULE | Refills: 0 | Status: SHIPPED | OUTPATIENT
Start: 2021-04-28 | End: 2021-06-05

## 2021-04-28 RX ORDER — FAMOTIDINE 20 MG/1
20 TABLET, FILM COATED ORAL 2 TIMES DAILY
Status: CANCELLED | OUTPATIENT
Start: 2021-04-28

## 2021-04-28 RX ORDER — MIDAZOLAM HYDROCHLORIDE 1 MG/ML
1 INJECTION INTRAMUSCULAR; INTRAVENOUS EVERY 5 MIN PRN
Status: CANCELLED | OUTPATIENT
Start: 2021-04-28

## 2021-04-28 RX ORDER — FENTANYL CITRATE 50 UG/ML
25 INJECTION, SOLUTION INTRAMUSCULAR; INTRAVENOUS EVERY 5 MIN PRN
Status: CANCELLED | OUTPATIENT
Start: 2021-04-28

## 2021-04-28 RX ORDER — OXYCODONE HYDROCHLORIDE 5 MG/1
5 TABLET ORAL
Status: CANCELLED | OUTPATIENT
Start: 2021-04-28

## 2021-04-28 RX ORDER — TALC
6 POWDER (GRAM) TOPICAL NIGHTLY PRN
Status: CANCELLED | OUTPATIENT
Start: 2021-04-28

## 2021-04-28 RX ORDER — CEFAZOLIN SODIUM 2 G/50ML
2 SOLUTION INTRAVENOUS
Status: CANCELLED | OUTPATIENT
Start: 2021-04-28

## 2021-04-28 RX ORDER — SODIUM CHLORIDE 0.9 % (FLUSH) 0.9 %
10 SYRINGE (ML) INJECTION
Status: CANCELLED | OUTPATIENT
Start: 2021-04-28

## 2021-04-28 RX ORDER — ACETAMINOPHEN 500 MG
1000 TABLET ORAL
Status: CANCELLED | OUTPATIENT
Start: 2021-04-28

## 2021-04-28 RX ORDER — DEXTROMETHORPHAN HYDROBROMIDE, GUAIFENESIN 5; 100 MG/5ML; MG/5ML
650 LIQUID ORAL EVERY 8 HOURS
Qty: 42 TABLET | Refills: 0 | Status: ON HOLD | OUTPATIENT
Start: 2021-04-28 | End: 2021-05-06 | Stop reason: HOSPADM

## 2021-04-28 RX ORDER — CELECOXIB 200 MG/1
400 CAPSULE ORAL
Status: CANCELLED | OUTPATIENT
Start: 2021-04-28

## 2021-04-28 RX ORDER — CELECOXIB 200 MG/1
200 CAPSULE ORAL DAILY
Status: CANCELLED | OUTPATIENT
Start: 2021-04-28

## 2021-04-28 RX ORDER — POLYETHYLENE GLYCOL 3350 17 G/17G
17 POWDER, FOR SOLUTION ORAL DAILY
Status: CANCELLED | OUTPATIENT
Start: 2021-04-28

## 2021-04-28 RX ORDER — CEFAZOLIN SODIUM 2 G/50ML
2 SOLUTION INTRAVENOUS
Status: CANCELLED | OUTPATIENT
Start: 2021-04-28 | End: 2021-04-29

## 2021-04-28 RX ORDER — NALOXONE HCL 0.4 MG/ML
0.02 VIAL (ML) INJECTION
Status: CANCELLED | OUTPATIENT
Start: 2021-04-28

## 2021-04-28 RX ORDER — ROPIVACAINE HYDROCHLORIDE 2 MG/ML
8 INJECTION, SOLUTION EPIDURAL; INFILTRATION; PERINEURAL CONTINUOUS
Status: CANCELLED | OUTPATIENT
Start: 2021-04-28

## 2021-04-28 RX ORDER — ASPIRIN 81 MG/1
81 TABLET ORAL 2 TIMES DAILY
Status: CANCELLED | OUTPATIENT
Start: 2021-04-28

## 2021-04-28 RX ORDER — PREGABALIN 75 MG/1
75 CAPSULE ORAL NIGHTLY
Status: CANCELLED | OUTPATIENT
Start: 2021-04-28

## 2021-04-28 RX ORDER — ONDANSETRON 4 MG/1
4 TABLET, FILM COATED ORAL EVERY 8 HOURS PRN
Qty: 21 TABLET | Refills: 0 | Status: SHIPPED | OUTPATIENT
Start: 2021-04-28 | End: 2021-05-13

## 2021-04-28 RX ORDER — OXYCODONE HYDROCHLORIDE 5 MG/1
10 TABLET ORAL
Status: CANCELLED | OUTPATIENT
Start: 2021-04-28

## 2021-04-28 RX ORDER — MORPHINE SULFATE 2 MG/ML
2 INJECTION, SOLUTION INTRAMUSCULAR; INTRAVENOUS
Status: CANCELLED | OUTPATIENT
Start: 2021-04-28

## 2021-04-28 RX ORDER — BISACODYL 10 MG
10 SUPPOSITORY, RECTAL RECTAL EVERY 12 HOURS PRN
Status: CANCELLED | OUTPATIENT
Start: 2021-04-28

## 2021-04-28 RX ORDER — PROCHLORPERAZINE EDISYLATE 5 MG/ML
5 INJECTION INTRAMUSCULAR; INTRAVENOUS EVERY 6 HOURS PRN
Status: CANCELLED | OUTPATIENT
Start: 2021-04-28

## 2021-04-28 RX ORDER — SODIUM CHLORIDE 9 MG/ML
INJECTION, SOLUTION INTRAVENOUS
Status: CANCELLED | OUTPATIENT
Start: 2021-04-28

## 2021-04-28 RX ORDER — ACETAMINOPHEN 500 MG
1000 TABLET ORAL EVERY 6 HOURS
Status: CANCELLED | OUTPATIENT
Start: 2021-04-28 | End: 2021-04-30

## 2021-04-28 RX ORDER — PREGABALIN 75 MG/1
75 CAPSULE ORAL
Status: CANCELLED | OUTPATIENT
Start: 2021-04-28

## 2021-04-28 RX ORDER — MUPIROCIN 20 MG/G
1 OINTMENT TOPICAL 2 TIMES DAILY
Status: CANCELLED | OUTPATIENT
Start: 2021-04-28 | End: 2021-05-03

## 2021-04-28 RX ORDER — MUPIROCIN 20 MG/G
1 OINTMENT TOPICAL
Status: CANCELLED | OUTPATIENT
Start: 2021-04-28

## 2021-04-28 RX ORDER — SODIUM CHLORIDE 9 MG/ML
INJECTION, SOLUTION INTRAVENOUS CONTINUOUS
Status: CANCELLED | OUTPATIENT
Start: 2021-04-28 | End: 2021-04-29

## 2021-04-28 RX ORDER — ASPIRIN 81 MG/1
81 TABLET ORAL 2 TIMES DAILY
Qty: 56 TABLET | Refills: 0 | Status: SHIPPED | OUTPATIENT
Start: 2021-04-28 | End: 2022-09-19

## 2021-04-28 RX ORDER — AMOXICILLIN 250 MG
1 CAPSULE ORAL 2 TIMES DAILY
Status: CANCELLED | OUTPATIENT
Start: 2021-04-28

## 2021-04-28 RX ORDER — ONDANSETRON 2 MG/ML
4 INJECTION INTRAMUSCULAR; INTRAVENOUS EVERY 8 HOURS PRN
Status: CANCELLED | OUTPATIENT
Start: 2021-04-28

## 2021-04-28 RX ORDER — LIDOCAINE HYDROCHLORIDE 10 MG/ML
1 INJECTION, SOLUTION EPIDURAL; INFILTRATION; INTRACAUDAL; PERINEURAL
Status: CANCELLED | OUTPATIENT
Start: 2021-04-28

## 2021-05-02 ENCOUNTER — LAB VISIT (OUTPATIENT)
Dept: SPORTS MEDICINE | Facility: CLINIC | Age: 51
End: 2021-05-02
Payer: MEDICARE

## 2021-05-02 DIAGNOSIS — Z01.818 PRE-OP TESTING: ICD-10-CM

## 2021-05-02 PROCEDURE — U0005 INFEC AGEN DETEC AMPLI PROBE: HCPCS | Performed by: ORTHOPAEDIC SURGERY

## 2021-05-02 PROCEDURE — U0003 INFECTIOUS AGENT DETECTION BY NUCLEIC ACID (DNA OR RNA); SEVERE ACUTE RESPIRATORY SYNDROME CORONAVIRUS 2 (SARS-COV-2) (CORONAVIRUS DISEASE [COVID-19]), AMPLIFIED PROBE TECHNIQUE, MAKING USE OF HIGH THROUGHPUT TECHNOLOGIES AS DESCRIBED BY CMS-2020-01-R: HCPCS | Performed by: ORTHOPAEDIC SURGERY

## 2021-05-03 ENCOUNTER — PATIENT MESSAGE (OUTPATIENT)
Dept: ADMINISTRATIVE | Facility: OTHER | Age: 51
End: 2021-05-03

## 2021-05-03 ENCOUNTER — PATIENT MESSAGE (OUTPATIENT)
Dept: SURGERY | Facility: HOSPITAL | Age: 51
End: 2021-05-03

## 2021-05-03 ENCOUNTER — TELEPHONE (OUTPATIENT)
Dept: SPORTS MEDICINE | Facility: CLINIC | Age: 51
End: 2021-05-03

## 2021-05-03 LAB — SARS-COV-2 RNA RESP QL NAA+PROBE: NOT DETECTED

## 2021-05-04 ENCOUNTER — PATIENT MESSAGE (OUTPATIENT)
Dept: SPORTS MEDICINE | Facility: CLINIC | Age: 51
End: 2021-05-04

## 2021-05-04 ENCOUNTER — PATIENT MESSAGE (OUTPATIENT)
Dept: SURGERY | Facility: HOSPITAL | Age: 51
End: 2021-05-04

## 2021-05-04 ENCOUNTER — ANESTHESIA EVENT (OUTPATIENT)
Dept: SURGERY | Facility: HOSPITAL | Age: 51
End: 2021-05-04
Payer: MEDICARE

## 2021-05-05 ENCOUNTER — HOSPITAL ENCOUNTER (OUTPATIENT)
Facility: HOSPITAL | Age: 51
LOS: 1 days | Discharge: HOME | End: 2021-05-06
Attending: ORTHOPAEDIC SURGERY | Admitting: ORTHOPAEDIC SURGERY
Payer: MEDICARE

## 2021-05-05 ENCOUNTER — ANESTHESIA (OUTPATIENT)
Dept: SURGERY | Facility: HOSPITAL | Age: 51
End: 2021-05-05
Payer: MEDICARE

## 2021-05-05 DIAGNOSIS — M25.662 DECREASED RANGE OF MOTION OF LEFT KNEE: Primary | ICD-10-CM

## 2021-05-05 DIAGNOSIS — M17.12 PRIMARY OSTEOARTHRITIS OF LEFT KNEE: ICD-10-CM

## 2021-05-05 PROCEDURE — 36000711: Performed by: ORTHOPAEDIC SURGERY

## 2021-05-05 PROCEDURE — 25000003 PHARM REV CODE 250: Performed by: PHYSICIAN ASSISTANT

## 2021-05-05 PROCEDURE — 37000009 HC ANESTHESIA EA ADD 15 MINS: Performed by: ORTHOPAEDIC SURGERY

## 2021-05-05 PROCEDURE — 64448 ADDUCTOR CANAL CATHETER: ICD-10-PCS | Mod: 59,LT,, | Performed by: ANESTHESIOLOGY

## 2021-05-05 PROCEDURE — 25000003 PHARM REV CODE 250: Performed by: ORTHOPAEDIC SURGERY

## 2021-05-05 PROCEDURE — 71000033 HC RECOVERY, INTIAL HOUR: Performed by: ORTHOPAEDIC SURGERY

## 2021-05-05 PROCEDURE — 63600175 PHARM REV CODE 636 W HCPCS: Performed by: ORTHOPAEDIC SURGERY

## 2021-05-05 PROCEDURE — 64448 NJX AA&/STRD FEM NRV NFS IMG: CPT | Performed by: ANESTHESIOLOGY

## 2021-05-05 PROCEDURE — 27201423 OPTIME MED/SURG SUP & DEVICES STERILE SUPPLY: Performed by: ORTHOPAEDIC SURGERY

## 2021-05-05 PROCEDURE — 63600175 PHARM REV CODE 636 W HCPCS: Performed by: PHYSICIAN ASSISTANT

## 2021-05-05 PROCEDURE — D9220A PRA ANESTHESIA: Mod: CRNA,,, | Performed by: NURSE ANESTHETIST, CERTIFIED REGISTERED

## 2021-05-05 PROCEDURE — 63600175 PHARM REV CODE 636 W HCPCS: Performed by: ANESTHESIOLOGY

## 2021-05-05 PROCEDURE — 94799 UNLISTED PULMONARY SVC/PX: CPT

## 2021-05-05 PROCEDURE — C1776 JOINT DEVICE (IMPLANTABLE): HCPCS | Performed by: ORTHOPAEDIC SURGERY

## 2021-05-05 PROCEDURE — 94761 N-INVAS EAR/PLS OXIMETRY MLT: CPT

## 2021-05-05 PROCEDURE — 27447 TOTAL KNEE ARTHROPLASTY: CPT | Mod: 58,LT,, | Performed by: ORTHOPAEDIC SURGERY

## 2021-05-05 PROCEDURE — 76942 ECHO GUIDE FOR BIOPSY: CPT | Mod: 26,,, | Performed by: ANESTHESIOLOGY

## 2021-05-05 PROCEDURE — D9220A PRA ANESTHESIA: Mod: ANES,,, | Performed by: ANESTHESIOLOGY

## 2021-05-05 PROCEDURE — 25000003 PHARM REV CODE 250: Performed by: NURSE ANESTHETIST, CERTIFIED REGISTERED

## 2021-05-05 PROCEDURE — 36000710: Performed by: ORTHOPAEDIC SURGERY

## 2021-05-05 PROCEDURE — 71000039 HC RECOVERY, EACH ADD'L HOUR: Performed by: ORTHOPAEDIC SURGERY

## 2021-05-05 PROCEDURE — 63600175 PHARM REV CODE 636 W HCPCS: Performed by: NURSE ANESTHETIST, CERTIFIED REGISTERED

## 2021-05-05 PROCEDURE — 99900035 HC TECH TIME PER 15 MIN (STAT)

## 2021-05-05 PROCEDURE — D9220A PRA ANESTHESIA: ICD-10-PCS | Mod: CRNA,,, | Performed by: NURSE ANESTHETIST, CERTIFIED REGISTERED

## 2021-05-05 PROCEDURE — 76942 ADDUCTOR CANAL CATHETER: ICD-10-PCS | Mod: 26,,, | Performed by: ANESTHESIOLOGY

## 2021-05-05 PROCEDURE — C1713 ANCHOR/SCREW BN/BN,TIS/BN: HCPCS | Performed by: ORTHOPAEDIC SURGERY

## 2021-05-05 PROCEDURE — 27447 PR TOTAL KNEE ARTHROPLASTY: ICD-10-PCS | Mod: 58,LT,, | Performed by: ORTHOPAEDIC SURGERY

## 2021-05-05 PROCEDURE — 37000008 HC ANESTHESIA 1ST 15 MINUTES: Performed by: ORTHOPAEDIC SURGERY

## 2021-05-05 PROCEDURE — D9220A PRA ANESTHESIA: ICD-10-PCS | Mod: ANES,,, | Performed by: ANESTHESIOLOGY

## 2021-05-05 DEVICE — CEMENT BONE SIMPLEX HV RADPQ: Type: IMPLANTABLE DEVICE | Site: KNEE | Status: FUNCTIONAL

## 2021-05-05 DEVICE — COMP FEM POST STAB CEM SZ 6 LT: Type: IMPLANTABLE DEVICE | Site: KNEE | Status: FUNCTIONAL

## 2021-05-05 DEVICE — PRIMARY TIBIAL BASE 5.: Type: IMPLANTABLE DEVICE | Site: KNEE | Status: FUNCTIONAL

## 2021-05-05 DEVICE — PATELLA TRIATHLON 33X9 SYMTRC: Type: IMPLANTABLE DEVICE | Site: KNEE | Status: FUNCTIONAL

## 2021-05-05 DEVICE — TIBIAL POST STAB SZ 5 9X3 POLY: Type: IMPLANTABLE DEVICE | Site: KNEE | Status: FUNCTIONAL

## 2021-05-05 RX ORDER — SODIUM CHLORIDE 0.9 % (FLUSH) 0.9 %
3 SYRINGE (ML) INJECTION
Status: DISCONTINUED | OUTPATIENT
Start: 2021-05-05 | End: 2021-05-05 | Stop reason: HOSPADM

## 2021-05-05 RX ORDER — CEFAZOLIN SODIUM 1 G/3ML
2 INJECTION, POWDER, FOR SOLUTION INTRAMUSCULAR; INTRAVENOUS
Status: COMPLETED | OUTPATIENT
Start: 2021-05-05 | End: 2021-05-05

## 2021-05-05 RX ORDER — AMOXICILLIN 250 MG
1 CAPSULE ORAL 2 TIMES DAILY
Status: DISCONTINUED | OUTPATIENT
Start: 2021-05-05 | End: 2021-05-06 | Stop reason: HOSPADM

## 2021-05-05 RX ORDER — ALBUTEROL SULFATE 90 UG/1
1 AEROSOL, METERED RESPIRATORY (INHALATION) EVERY 4 HOURS PRN
Status: DISCONTINUED | OUTPATIENT
Start: 2021-05-05 | End: 2021-05-06 | Stop reason: HOSPADM

## 2021-05-05 RX ORDER — ONDANSETRON 2 MG/ML
INJECTION INTRAMUSCULAR; INTRAVENOUS
Status: DISCONTINUED | OUTPATIENT
Start: 2021-05-05 | End: 2021-05-05

## 2021-05-05 RX ORDER — FAMOTIDINE 10 MG/ML
INJECTION INTRAVENOUS
Status: DISCONTINUED | OUTPATIENT
Start: 2021-05-05 | End: 2021-05-05

## 2021-05-05 RX ORDER — MUPIROCIN 20 MG/G
1 OINTMENT TOPICAL 2 TIMES DAILY
Status: DISCONTINUED | OUTPATIENT
Start: 2021-05-05 | End: 2021-05-06 | Stop reason: HOSPADM

## 2021-05-05 RX ORDER — KETAMINE HYDROCHLORIDE 100 MG/ML
INJECTION, SOLUTION INTRAMUSCULAR; INTRAVENOUS
Status: DISCONTINUED | OUTPATIENT
Start: 2021-05-05 | End: 2021-05-05

## 2021-05-05 RX ORDER — SODIUM CHLORIDE 9 MG/ML
INJECTION, SOLUTION INTRAVENOUS
Status: COMPLETED | OUTPATIENT
Start: 2021-05-05 | End: 2021-05-05

## 2021-05-05 RX ORDER — BISACODYL 10 MG
10 SUPPOSITORY, RECTAL RECTAL EVERY 12 HOURS PRN
Status: DISCONTINUED | OUTPATIENT
Start: 2021-05-05 | End: 2021-05-06 | Stop reason: HOSPADM

## 2021-05-05 RX ORDER — HYDROCODONE BITARTRATE AND ACETAMINOPHEN 10; 325 MG/1; MG/1
1 TABLET ORAL
COMMUNITY

## 2021-05-05 RX ORDER — TALC
6 POWDER (GRAM) TOPICAL NIGHTLY PRN
Status: DISCONTINUED | OUTPATIENT
Start: 2021-05-05 | End: 2021-05-05 | Stop reason: HOSPADM

## 2021-05-05 RX ORDER — ROPIVACAINE/EPI/CLONIDINE/KET 2.46-0.005
SYRINGE (ML) INJECTION
Status: DISCONTINUED | OUTPATIENT
Start: 2021-05-05 | End: 2021-05-05 | Stop reason: HOSPADM

## 2021-05-05 RX ORDER — MUPIROCIN 20 MG/G
1 OINTMENT TOPICAL
Status: COMPLETED | OUTPATIENT
Start: 2021-05-05 | End: 2021-05-05

## 2021-05-05 RX ORDER — ONDANSETRON 2 MG/ML
4 INJECTION INTRAMUSCULAR; INTRAVENOUS ONCE AS NEEDED
Status: DISCONTINUED | OUTPATIENT
Start: 2021-05-05 | End: 2021-05-05 | Stop reason: HOSPADM

## 2021-05-05 RX ORDER — FAMOTIDINE 20 MG/1
20 TABLET, FILM COATED ORAL 2 TIMES DAILY
Status: DISCONTINUED | OUTPATIENT
Start: 2021-05-05 | End: 2021-05-06 | Stop reason: HOSPADM

## 2021-05-05 RX ORDER — FLUTICASONE FUROATE AND VILANTEROL 100; 25 UG/1; UG/1
1 POWDER RESPIRATORY (INHALATION) DAILY
Refills: 0 | Status: DISCONTINUED | OUTPATIENT
Start: 2021-05-06 | End: 2021-05-06 | Stop reason: HOSPADM

## 2021-05-05 RX ORDER — DEXAMETHASONE SODIUM PHOSPHATE 4 MG/ML
INJECTION, SOLUTION INTRA-ARTICULAR; INTRALESIONAL; INTRAMUSCULAR; INTRAVENOUS; SOFT TISSUE
Status: DISCONTINUED | OUTPATIENT
Start: 2021-05-05 | End: 2021-05-05

## 2021-05-05 RX ORDER — MIDAZOLAM HYDROCHLORIDE 1 MG/ML
INJECTION, SOLUTION INTRAMUSCULAR; INTRAVENOUS
Status: DISCONTINUED | OUTPATIENT
Start: 2021-05-05 | End: 2021-05-05

## 2021-05-05 RX ORDER — ROPIVACAINE HYDROCHLORIDE 2 MG/ML
INJECTION, SOLUTION EPIDURAL; INFILTRATION; PERINEURAL CONTINUOUS
Status: DISCONTINUED | OUTPATIENT
Start: 2021-05-05 | End: 2021-05-06 | Stop reason: HOSPADM

## 2021-05-05 RX ORDER — FENTANYL CITRATE 50 UG/ML
25 INJECTION, SOLUTION INTRAMUSCULAR; INTRAVENOUS EVERY 5 MIN PRN
Status: DISCONTINUED | OUTPATIENT
Start: 2021-05-05 | End: 2021-05-05 | Stop reason: HOSPADM

## 2021-05-05 RX ORDER — OXYCODONE HYDROCHLORIDE 5 MG/1
5 TABLET ORAL
Status: DISCONTINUED | OUTPATIENT
Start: 2021-05-05 | End: 2021-05-06 | Stop reason: HOSPADM

## 2021-05-05 RX ORDER — VANCOMYCIN HYDROCHLORIDE 1 G/20ML
INJECTION, POWDER, LYOPHILIZED, FOR SOLUTION INTRAVENOUS
Status: DISCONTINUED | OUTPATIENT
Start: 2021-05-05 | End: 2021-05-05 | Stop reason: HOSPADM

## 2021-05-05 RX ORDER — LIDOCAINE HYDROCHLORIDE AND EPINEPHRINE 15; 5 MG/ML; UG/ML
INJECTION, SOLUTION EPIDURAL
Status: DISCONTINUED | OUTPATIENT
Start: 2021-05-05 | End: 2021-05-05

## 2021-05-05 RX ORDER — ACETAMINOPHEN 500 MG
1000 TABLET ORAL EVERY 6 HOURS
Status: DISCONTINUED | OUTPATIENT
Start: 2021-05-05 | End: 2021-05-06 | Stop reason: HOSPADM

## 2021-05-05 RX ORDER — TRANEXAMIC ACID 100 MG/ML
1000 INJECTION, SOLUTION INTRAVENOUS
Status: COMPLETED | OUTPATIENT
Start: 2021-05-05 | End: 2021-05-05

## 2021-05-05 RX ORDER — CELECOXIB 200 MG/1
400 CAPSULE ORAL
Status: COMPLETED | OUTPATIENT
Start: 2021-05-05 | End: 2021-05-05

## 2021-05-05 RX ORDER — ASPIRIN 81 MG/1
81 TABLET ORAL 2 TIMES DAILY
Status: DISCONTINUED | OUTPATIENT
Start: 2021-05-05 | End: 2021-05-06 | Stop reason: HOSPADM

## 2021-05-05 RX ORDER — ONDANSETRON 2 MG/ML
4 INJECTION INTRAMUSCULAR; INTRAVENOUS EVERY 8 HOURS PRN
Status: DISCONTINUED | OUTPATIENT
Start: 2021-05-05 | End: 2021-05-06 | Stop reason: HOSPADM

## 2021-05-05 RX ORDER — TRAMADOL HYDROCHLORIDE 50 MG/1
50 TABLET ORAL EVERY 6 HOURS
COMMUNITY

## 2021-05-05 RX ORDER — PROPOFOL 10 MG/ML
VIAL (ML) INTRAVENOUS
Status: DISCONTINUED | OUTPATIENT
Start: 2021-05-05 | End: 2021-05-05

## 2021-05-05 RX ORDER — POLYETHYLENE GLYCOL 3350 17 G/17G
17 POWDER, FOR SOLUTION ORAL DAILY
Status: DISCONTINUED | OUTPATIENT
Start: 2021-05-06 | End: 2021-05-06 | Stop reason: HOSPADM

## 2021-05-05 RX ORDER — ROPIVACAINE HYDROCHLORIDE 2 MG/ML
8 INJECTION, SOLUTION EPIDURAL; INFILTRATION; PERINEURAL CONTINUOUS
Status: DISCONTINUED | OUTPATIENT
Start: 2021-05-05 | End: 2021-05-06 | Stop reason: HOSPADM

## 2021-05-05 RX ORDER — ACETAMINOPHEN 500 MG
1000 TABLET ORAL
Status: COMPLETED | OUTPATIENT
Start: 2021-05-05 | End: 2021-05-05

## 2021-05-05 RX ORDER — MORPHINE SULFATE 2 MG/ML
2 INJECTION, SOLUTION INTRAMUSCULAR; INTRAVENOUS
Status: DISCONTINUED | OUTPATIENT
Start: 2021-05-05 | End: 2021-05-06 | Stop reason: HOSPADM

## 2021-05-05 RX ORDER — CEFAZOLIN SODIUM 1 G/3ML
2 INJECTION, POWDER, FOR SOLUTION INTRAMUSCULAR; INTRAVENOUS
Status: COMPLETED | OUTPATIENT
Start: 2021-05-05 | End: 2021-05-06

## 2021-05-05 RX ORDER — METOPROLOL SUCCINATE 25 MG/1
25 TABLET, EXTENDED RELEASE ORAL DAILY
Status: DISCONTINUED | OUTPATIENT
Start: 2021-05-06 | End: 2021-05-06 | Stop reason: HOSPADM

## 2021-05-05 RX ORDER — NALOXONE HCL 0.4 MG/ML
0.02 VIAL (ML) INJECTION
Status: DISCONTINUED | OUTPATIENT
Start: 2021-05-05 | End: 2021-05-06 | Stop reason: HOSPADM

## 2021-05-05 RX ORDER — PREGABALIN 75 MG/1
75 CAPSULE ORAL
Status: COMPLETED | OUTPATIENT
Start: 2021-05-05 | End: 2021-05-05

## 2021-05-05 RX ORDER — SODIUM CHLORIDE 9 MG/ML
INJECTION, SOLUTION INTRAVENOUS CONTINUOUS
Status: DISCONTINUED | OUTPATIENT
Start: 2021-05-05 | End: 2021-05-06 | Stop reason: HOSPADM

## 2021-05-05 RX ORDER — OXYCODONE HYDROCHLORIDE 10 MG/1
10 TABLET ORAL
Status: DISCONTINUED | OUTPATIENT
Start: 2021-05-05 | End: 2021-05-06 | Stop reason: HOSPADM

## 2021-05-05 RX ORDER — SODIUM CHLORIDE 0.9 % (FLUSH) 0.9 %
10 SYRINGE (ML) INJECTION
Status: DISCONTINUED | OUTPATIENT
Start: 2021-05-05 | End: 2021-05-05 | Stop reason: HOSPADM

## 2021-05-05 RX ORDER — PROCHLORPERAZINE EDISYLATE 5 MG/ML
5 INJECTION INTRAMUSCULAR; INTRAVENOUS EVERY 6 HOURS PRN
Status: DISCONTINUED | OUTPATIENT
Start: 2021-05-05 | End: 2021-05-06 | Stop reason: HOSPADM

## 2021-05-05 RX ORDER — LIDOCAINE HYDROCHLORIDE 20 MG/ML
INJECTION INTRAVENOUS
Status: DISCONTINUED | OUTPATIENT
Start: 2021-05-05 | End: 2021-05-05

## 2021-05-05 RX ORDER — OXYBUTYNIN CHLORIDE 5 MG/1
5 TABLET, EXTENDED RELEASE ORAL DAILY
Status: DISCONTINUED | OUTPATIENT
Start: 2021-05-06 | End: 2021-05-06 | Stop reason: HOSPADM

## 2021-05-05 RX ORDER — PREGABALIN 75 MG/1
75 CAPSULE ORAL NIGHTLY
Status: DISCONTINUED | OUTPATIENT
Start: 2021-05-05 | End: 2021-05-06 | Stop reason: HOSPADM

## 2021-05-05 RX ORDER — LIDOCAINE HYDROCHLORIDE 10 MG/ML
1 INJECTION, SOLUTION EPIDURAL; INFILTRATION; INTRACAUDAL; PERINEURAL
Status: DISCONTINUED | OUTPATIENT
Start: 2021-05-05 | End: 2021-05-05 | Stop reason: HOSPADM

## 2021-05-05 RX ORDER — MIDAZOLAM HYDROCHLORIDE 1 MG/ML
1 INJECTION INTRAMUSCULAR; INTRAVENOUS EVERY 5 MIN PRN
Status: DISCONTINUED | OUTPATIENT
Start: 2021-05-05 | End: 2021-05-05 | Stop reason: HOSPADM

## 2021-05-05 RX ADMIN — DOCUSATE SODIUM 50MG AND SENNOSIDES 8.6MG 1 TABLET: 8.6; 5 TABLET, FILM COATED ORAL at 08:05

## 2021-05-05 RX ADMIN — VANCOMYCIN HYDROCHLORIDE 1500 MG: 1.5 INJECTION, POWDER, LYOPHILIZED, FOR SOLUTION INTRAVENOUS at 09:05

## 2021-05-05 RX ADMIN — TRANEXAMIC ACID 500 MG: 100 INJECTION, SOLUTION INTRAVENOUS at 03:05

## 2021-05-05 RX ADMIN — ACETAMINOPHEN 1000 MG: 500 TABLET, FILM COATED ORAL at 09:05

## 2021-05-05 RX ADMIN — ONDANSETRON 4 MG: 2 INJECTION, SOLUTION INTRAMUSCULAR; INTRAVENOUS at 02:05

## 2021-05-05 RX ADMIN — SODIUM CHLORIDE: 0.9 INJECTION, SOLUTION INTRAVENOUS at 11:05

## 2021-05-05 RX ADMIN — PROPOFOL 50 MG: 10 INJECTION, EMULSION INTRAVENOUS at 01:05

## 2021-05-05 RX ADMIN — MIDAZOLAM HYDROCHLORIDE 1 MG: 1 INJECTION, SOLUTION INTRAMUSCULAR; INTRAVENOUS at 10:05

## 2021-05-05 RX ADMIN — LIDOCAINE HYDROCHLORIDE,EPINEPHRINE BITARTRATE 4 ML: 15; .005 INJECTION, SOLUTION EPIDURAL; INFILTRATION; INTRACAUDAL; PERINEURAL at 03:05

## 2021-05-05 RX ADMIN — TRANEXAMIC ACID 500 MG: 100 INJECTION, SOLUTION INTRAVENOUS at 02:05

## 2021-05-05 RX ADMIN — OXYCODONE HYDROCHLORIDE 10 MG: 10 TABLET ORAL at 11:05

## 2021-05-05 RX ADMIN — CEFAZOLIN 2 G: 330 INJECTION, POWDER, FOR SOLUTION INTRAMUSCULAR; INTRAVENOUS at 10:05

## 2021-05-05 RX ADMIN — FENTANYL CITRATE 50 MCG: 50 INJECTION INTRAMUSCULAR; INTRAVENOUS at 10:05

## 2021-05-05 RX ADMIN — MEPIVACAINE HYDROCHLORIDE 6 ML/HR: 15 INJECTION, SOLUTION EPIDURAL; INFILTRATION at 03:05

## 2021-05-05 RX ADMIN — ACETAMINOPHEN 1000 MG: 500 TABLET, FILM COATED ORAL at 05:05

## 2021-05-05 RX ADMIN — OXYCODONE 5 MG: 5 TABLET ORAL at 05:05

## 2021-05-05 RX ADMIN — KETAMINE HYDROCHLORIDE 20 MG: 100 INJECTION, SOLUTION, CONCENTRATE INTRAMUSCULAR; INTRAVENOUS at 02:05

## 2021-05-05 RX ADMIN — FAMOTIDINE 20 MG: 20 TABLET, FILM COATED ORAL at 08:05

## 2021-05-05 RX ADMIN — MUPIROCIN 1 G: 20 OINTMENT TOPICAL at 09:05

## 2021-05-05 RX ADMIN — PROPOFOL 150 MG: 10 INJECTION, EMULSION INTRAVENOUS at 02:05

## 2021-05-05 RX ADMIN — PROPOFOL 100 MCG/KG/MIN: 10 INJECTION, EMULSION INTRAVENOUS at 01:05

## 2021-05-05 RX ADMIN — ROPIVACAINE HYDROCHLORIDE 7.5 ML: 5 INJECTION, SOLUTION EPIDURAL; INFILTRATION; PERINEURAL at 09:05

## 2021-05-05 RX ADMIN — ASPIRIN 81 MG: 81 TABLET, COATED ORAL at 08:05

## 2021-05-05 RX ADMIN — SODIUM CHLORIDE: 0.9 INJECTION, SOLUTION INTRAVENOUS at 05:05

## 2021-05-05 RX ADMIN — Medication: at 05:05

## 2021-05-05 RX ADMIN — PREGABALIN 75 MG: 75 CAPSULE ORAL at 09:05

## 2021-05-05 RX ADMIN — PREGABALIN 75 MG: 75 CAPSULE ORAL at 08:05

## 2021-05-05 RX ADMIN — LIDOCAINE HYDROCHLORIDE 75 MG: 20 INJECTION, SOLUTION INTRAVENOUS at 01:05

## 2021-05-05 RX ADMIN — SODIUM CHLORIDE: 0.9 INJECTION, SOLUTION INTRAVENOUS at 01:05

## 2021-05-05 RX ADMIN — CEFAZOLIN 2 G: 330 INJECTION, POWDER, FOR SOLUTION INTRAMUSCULAR; INTRAVENOUS at 02:05

## 2021-05-05 RX ADMIN — MIDAZOLAM HYDROCHLORIDE 1 MG: 1 INJECTION, SOLUTION INTRAMUSCULAR; INTRAVENOUS at 01:05

## 2021-05-05 RX ADMIN — MUPIROCIN 1 G: 20 OINTMENT TOPICAL at 08:05

## 2021-05-05 RX ADMIN — MEPIVACAINE HYDROCHLORIDE 3.5 ML: 15 INJECTION, SOLUTION EPIDURAL; INFILTRATION at 01:05

## 2021-05-05 RX ADMIN — CELECOXIB 400 MG: 200 CAPSULE ORAL at 09:05

## 2021-05-05 RX ADMIN — DEXAMETHASONE SODIUM PHOSPHATE 8 MG: 4 INJECTION, SOLUTION INTRAMUSCULAR; INTRAVENOUS at 02:05

## 2021-05-05 RX ADMIN — SODIUM CHLORIDE: 0.9 INJECTION, SOLUTION INTRAVENOUS at 09:05

## 2021-05-05 RX ADMIN — FAMOTIDINE 20 MG: 10 INJECTION, SOLUTION INTRAVENOUS at 02:05

## 2021-05-05 RX ADMIN — ACETAMINOPHEN 1000 MG: 500 TABLET, FILM COATED ORAL at 11:05

## 2021-05-06 ENCOUNTER — PATIENT MESSAGE (OUTPATIENT)
Dept: SPORTS MEDICINE | Facility: CLINIC | Age: 51
End: 2021-05-06

## 2021-05-06 VITALS
SYSTOLIC BLOOD PRESSURE: 118 MMHG | WEIGHT: 220 LBS | HEIGHT: 71 IN | TEMPERATURE: 97 F | BODY MASS INDEX: 30.8 KG/M2 | OXYGEN SATURATION: 99 % | HEART RATE: 66 BPM | RESPIRATION RATE: 18 BRPM | DIASTOLIC BLOOD PRESSURE: 59 MMHG

## 2021-05-06 PROCEDURE — 97535 SELF CARE MNGMENT TRAINING: CPT

## 2021-05-06 PROCEDURE — 94640 AIRWAY INHALATION TREATMENT: CPT

## 2021-05-06 PROCEDURE — 25000242 PHARM REV CODE 250 ALT 637 W/ HCPCS: Performed by: ANESTHESIOLOGY

## 2021-05-06 PROCEDURE — 99203 OFFICE O/P NEW LOW 30 MIN: CPT | Mod: ,,, | Performed by: ANESTHESIOLOGY

## 2021-05-06 PROCEDURE — 97161 PT EVAL LOW COMPLEX 20 MIN: CPT

## 2021-05-06 PROCEDURE — 94761 N-INVAS EAR/PLS OXIMETRY MLT: CPT

## 2021-05-06 PROCEDURE — 25000003 PHARM REV CODE 250: Performed by: ANESTHESIOLOGY

## 2021-05-06 PROCEDURE — 99203 PR OFFICE/OUTPT VISIT, NEW, LEVL III, 30-44 MIN: ICD-10-PCS | Mod: ,,, | Performed by: ANESTHESIOLOGY

## 2021-05-06 PROCEDURE — 63600175 PHARM REV CODE 636 W HCPCS: Performed by: PHYSICIAN ASSISTANT

## 2021-05-06 PROCEDURE — 99900035 HC TECH TIME PER 15 MIN (STAT)

## 2021-05-06 PROCEDURE — 97116 GAIT TRAINING THERAPY: CPT

## 2021-05-06 PROCEDURE — 25000003 PHARM REV CODE 250: Performed by: PHYSICIAN ASSISTANT

## 2021-05-06 PROCEDURE — 97165 OT EVAL LOW COMPLEX 30 MIN: CPT

## 2021-05-06 RX ORDER — ROPIVACAINE HYDROCHLORIDE 5 MG/ML
INJECTION, SOLUTION EPIDURAL; INFILTRATION; PERINEURAL
Status: DISCONTINUED | OUTPATIENT
Start: 2021-05-05 | End: 2021-05-06

## 2021-05-06 RX ORDER — CELECOXIB 200 MG/1
200 CAPSULE ORAL DAILY
Status: DISCONTINUED | OUTPATIENT
Start: 2021-05-06 | End: 2021-05-06 | Stop reason: HOSPADM

## 2021-05-06 RX ADMIN — OXYCODONE HYDROCHLORIDE 10 MG: 10 TABLET ORAL at 04:05

## 2021-05-06 RX ADMIN — CEFAZOLIN 2 G: 330 INJECTION, POWDER, FOR SOLUTION INTRAMUSCULAR; INTRAVENOUS at 06:05

## 2021-05-06 RX ADMIN — DOCUSATE SODIUM 50MG AND SENNOSIDES 8.6MG 1 TABLET: 8.6; 5 TABLET, FILM COATED ORAL at 08:05

## 2021-05-06 RX ADMIN — MUPIROCIN 1 G: 20 OINTMENT TOPICAL at 08:05

## 2021-05-06 RX ADMIN — CELECOXIB 200 MG: 200 CAPSULE ORAL at 08:05

## 2021-05-06 RX ADMIN — FAMOTIDINE 20 MG: 20 TABLET, FILM COATED ORAL at 08:05

## 2021-05-06 RX ADMIN — OXYBUTYNIN CHLORIDE 5 MG: 5 TABLET, EXTENDED RELEASE ORAL at 08:05

## 2021-05-06 RX ADMIN — METOPROLOL SUCCINATE 25 MG: 25 TABLET, EXTENDED RELEASE ORAL at 08:05

## 2021-05-06 RX ADMIN — ACETAMINOPHEN 1000 MG: 500 TABLET, FILM COATED ORAL at 06:05

## 2021-05-06 RX ADMIN — POLYETHYLENE GLYCOL 3350 17 G: 17 POWDER, FOR SOLUTION ORAL at 08:05

## 2021-05-06 RX ADMIN — FLUTICASONE FUROATE AND VILANTEROL TRIFENATATE 1 PUFF: 100; 25 POWDER RESPIRATORY (INHALATION) at 07:05

## 2021-05-06 RX ADMIN — ASPIRIN 81 MG: 81 TABLET, COATED ORAL at 08:05

## 2021-05-07 ENCOUNTER — PATIENT MESSAGE (OUTPATIENT)
Dept: SPORTS MEDICINE | Facility: CLINIC | Age: 51
End: 2021-05-07

## 2021-05-07 ENCOUNTER — TELEPHONE (OUTPATIENT)
Dept: SPORTS MEDICINE | Facility: CLINIC | Age: 51
End: 2021-05-07

## 2021-05-08 ENCOUNTER — TELEPHONE (OUTPATIENT)
Dept: ORTHOPEDICS | Facility: HOSPITAL | Age: 51
End: 2021-05-08

## 2021-05-10 ENCOUNTER — TELEPHONE (OUTPATIENT)
Dept: SPORTS MEDICINE | Facility: CLINIC | Age: 51
End: 2021-05-10

## 2021-05-11 PROBLEM — R26.9 GAIT ABNORMALITY: Status: RESOLVED | Noted: 2020-12-02 | Resolved: 2021-05-11

## 2021-05-11 PROBLEM — R60.0 LOCALIZED EDEMA: Status: ACTIVE | Noted: 2021-05-11

## 2021-05-11 PROBLEM — R29.898 LEG WEAKNESS: Status: RESOLVED | Noted: 2020-12-02 | Resolved: 2021-05-11

## 2021-05-11 PROBLEM — R53.1 WEAKNESS: Status: ACTIVE | Noted: 2021-05-11

## 2021-05-13 ENCOUNTER — PATIENT MESSAGE (OUTPATIENT)
Dept: ADMINISTRATIVE | Facility: OTHER | Age: 51
End: 2021-05-13

## 2021-05-19 ENCOUNTER — OFFICE VISIT (OUTPATIENT)
Dept: SPORTS MEDICINE | Facility: CLINIC | Age: 51
End: 2021-05-19
Payer: MEDICARE

## 2021-05-19 ENCOUNTER — HOSPITAL ENCOUNTER (OUTPATIENT)
Dept: RADIOLOGY | Facility: HOSPITAL | Age: 51
Discharge: HOME | End: 2021-05-19
Attending: PHYSICIAN ASSISTANT
Payer: MEDICARE

## 2021-05-19 VITALS
SYSTOLIC BLOOD PRESSURE: 127 MMHG | HEART RATE: 73 BPM | DIASTOLIC BLOOD PRESSURE: 68 MMHG | BODY MASS INDEX: 30.94 KG/M2 | HEIGHT: 71 IN | WEIGHT: 221 LBS

## 2021-05-19 DIAGNOSIS — M25.562 LEFT KNEE PAIN, UNSPECIFIED CHRONICITY: ICD-10-CM

## 2021-05-19 DIAGNOSIS — Z96.652 S/P TOTAL KNEE ARTHROPLASTY, LEFT: Primary | ICD-10-CM

## 2021-05-19 PROCEDURE — 73564 X-RAY EXAM KNEE 4 OR MORE: CPT | Mod: 26,LT,, | Performed by: RADIOLOGY

## 2021-05-19 PROCEDURE — 1125F PR PAIN SEVERITY QUANTIFIED, PAIN PRESENT: ICD-10-PCS | Mod: S$GLB,,, | Performed by: PHYSICIAN ASSISTANT

## 2021-05-19 PROCEDURE — 73562 X-RAY EXAM OF KNEE 3: CPT | Mod: 26,RT,, | Performed by: RADIOLOGY

## 2021-05-19 PROCEDURE — 99999 PR PBB SHADOW E&M-EST. PATIENT-LVL IV: CPT | Mod: PBBFAC,,, | Performed by: PHYSICIAN ASSISTANT

## 2021-05-19 PROCEDURE — 73564 XR KNEE ORTHO LEFT WITH FLEXION: ICD-10-PCS | Mod: 26,LT,, | Performed by: RADIOLOGY

## 2021-05-19 PROCEDURE — 1125F AMNT PAIN NOTED PAIN PRSNT: CPT | Mod: S$GLB,,, | Performed by: PHYSICIAN ASSISTANT

## 2021-05-19 PROCEDURE — 73562 XR KNEE ORTHO LEFT WITH FLEXION: ICD-10-PCS | Mod: 26,RT,, | Performed by: RADIOLOGY

## 2021-05-19 PROCEDURE — 3008F PR BODY MASS INDEX (BMI) DOCUMENTED: ICD-10-PCS | Mod: CPTII,S$GLB,, | Performed by: PHYSICIAN ASSISTANT

## 2021-05-19 PROCEDURE — 99024 POSTOP FOLLOW-UP VISIT: CPT | Mod: S$GLB,,, | Performed by: PHYSICIAN ASSISTANT

## 2021-05-19 PROCEDURE — 99999 PR PBB SHADOW E&M-EST. PATIENT-LVL IV: ICD-10-PCS | Mod: PBBFAC,,, | Performed by: PHYSICIAN ASSISTANT

## 2021-05-19 PROCEDURE — 99024 PR POST-OP FOLLOW-UP VISIT: ICD-10-PCS | Mod: S$GLB,,, | Performed by: PHYSICIAN ASSISTANT

## 2021-05-19 PROCEDURE — 73564 X-RAY EXAM KNEE 4 OR MORE: CPT | Mod: TC,LT

## 2021-05-19 PROCEDURE — 3008F BODY MASS INDEX DOCD: CPT | Mod: CPTII,S$GLB,, | Performed by: PHYSICIAN ASSISTANT

## 2021-05-21 ENCOUNTER — PATIENT MESSAGE (OUTPATIENT)
Dept: SPORTS MEDICINE | Facility: CLINIC | Age: 51
End: 2021-05-21

## 2021-05-25 ENCOUNTER — PATIENT MESSAGE (OUTPATIENT)
Dept: SPORTS MEDICINE | Facility: CLINIC | Age: 51
End: 2021-05-25

## 2021-05-26 ENCOUNTER — PATIENT MESSAGE (OUTPATIENT)
Dept: SPORTS MEDICINE | Facility: CLINIC | Age: 51
End: 2021-05-26

## 2021-05-26 RX ORDER — DIAZEPAM 5 MG/1
5 TABLET ORAL NIGHTLY
Qty: 20 TABLET | Refills: 0 | Status: SHIPPED | OUTPATIENT
Start: 2021-05-26 | End: 2022-09-19

## 2021-06-17 ENCOUNTER — OFFICE VISIT (OUTPATIENT)
Dept: SPORTS MEDICINE | Facility: CLINIC | Age: 51
End: 2021-06-17
Payer: MEDICARE

## 2021-06-17 VITALS
WEIGHT: 218 LBS | HEART RATE: 42 BPM | SYSTOLIC BLOOD PRESSURE: 110 MMHG | DIASTOLIC BLOOD PRESSURE: 61 MMHG | HEIGHT: 71 IN | BODY MASS INDEX: 30.52 KG/M2

## 2021-06-17 DIAGNOSIS — Z96.652 S/P TOTAL KNEE ARTHROPLASTY, LEFT: Primary | ICD-10-CM

## 2021-06-17 PROCEDURE — 1125F AMNT PAIN NOTED PAIN PRSNT: CPT | Mod: S$GLB,,, | Performed by: ORTHOPAEDIC SURGERY

## 2021-06-17 PROCEDURE — 99999 PR PBB SHADOW E&M-EST. PATIENT-LVL III: CPT | Mod: PBBFAC,,, | Performed by: ORTHOPAEDIC SURGERY

## 2021-06-17 PROCEDURE — 3008F BODY MASS INDEX DOCD: CPT | Mod: CPTII,S$GLB,, | Performed by: ORTHOPAEDIC SURGERY

## 2021-06-17 PROCEDURE — 99999 PR PBB SHADOW E&M-EST. PATIENT-LVL III: ICD-10-PCS | Mod: PBBFAC,,, | Performed by: ORTHOPAEDIC SURGERY

## 2021-06-17 PROCEDURE — 3008F PR BODY MASS INDEX (BMI) DOCUMENTED: ICD-10-PCS | Mod: CPTII,S$GLB,, | Performed by: ORTHOPAEDIC SURGERY

## 2021-06-17 PROCEDURE — 1125F PR PAIN SEVERITY QUANTIFIED, PAIN PRESENT: ICD-10-PCS | Mod: S$GLB,,, | Performed by: ORTHOPAEDIC SURGERY

## 2021-06-17 PROCEDURE — 99024 POSTOP FOLLOW-UP VISIT: CPT | Mod: S$GLB,,, | Performed by: ORTHOPAEDIC SURGERY

## 2021-06-17 PROCEDURE — 99024 PR POST-OP FOLLOW-UP VISIT: ICD-10-PCS | Mod: S$GLB,,, | Performed by: ORTHOPAEDIC SURGERY

## 2021-06-23 ENCOUNTER — TELEPHONE (OUTPATIENT)
Dept: SLEEP MEDICINE | Facility: CLINIC | Age: 51
End: 2021-06-23

## 2021-06-25 DIAGNOSIS — Z12.31 SCREENING MAMMOGRAM, ENCOUNTER FOR: Primary | ICD-10-CM

## 2021-07-02 ENCOUNTER — TELEPHONE (OUTPATIENT)
Dept: SLEEP MEDICINE | Facility: CLINIC | Age: 51
End: 2021-07-02

## 2021-07-07 ENCOUNTER — PATIENT MESSAGE (OUTPATIENT)
Dept: NEUROLOGY | Facility: CLINIC | Age: 51
End: 2021-07-07

## 2021-07-08 ENCOUNTER — OFFICE VISIT (OUTPATIENT)
Dept: CARDIOLOGY | Facility: CLINIC | Age: 51
End: 2021-07-08
Payer: MEDICARE

## 2021-07-08 VITALS
HEIGHT: 71 IN | DIASTOLIC BLOOD PRESSURE: 82 MMHG | BODY MASS INDEX: 31.02 KG/M2 | HEART RATE: 67 BPM | OXYGEN SATURATION: 98 % | WEIGHT: 221.56 LBS | SYSTOLIC BLOOD PRESSURE: 122 MMHG

## 2021-07-08 DIAGNOSIS — R00.2 PALPITATIONS: ICD-10-CM

## 2021-07-08 DIAGNOSIS — E78.00 PURE HYPERCHOLESTEROLEMIA: Primary | ICD-10-CM

## 2021-07-08 DIAGNOSIS — G47.33 OSA (OBSTRUCTIVE SLEEP APNEA): ICD-10-CM

## 2021-07-08 DIAGNOSIS — R07.89 CHEST PAIN, ATYPICAL: ICD-10-CM

## 2021-07-08 PROCEDURE — 3008F BODY MASS INDEX DOCD: CPT | Mod: CPTII,S$GLB,, | Performed by: INTERNAL MEDICINE

## 2021-07-08 PROCEDURE — 1126F PR PAIN SEVERITY QUANTIFIED, NO PAIN PRESENT: ICD-10-PCS | Mod: S$GLB,,, | Performed by: INTERNAL MEDICINE

## 2021-07-08 PROCEDURE — 99214 PR OFFICE/OUTPT VISIT, EST, LEVL IV, 30-39 MIN: ICD-10-PCS | Mod: S$GLB,,, | Performed by: INTERNAL MEDICINE

## 2021-07-08 PROCEDURE — 99214 OFFICE O/P EST MOD 30 MIN: CPT | Mod: S$GLB,,, | Performed by: INTERNAL MEDICINE

## 2021-07-08 PROCEDURE — 99999 PR PBB SHADOW E&M-EST. PATIENT-LVL IV: ICD-10-PCS | Mod: PBBFAC,,, | Performed by: INTERNAL MEDICINE

## 2021-07-08 PROCEDURE — 1126F AMNT PAIN NOTED NONE PRSNT: CPT | Mod: S$GLB,,, | Performed by: INTERNAL MEDICINE

## 2021-07-08 PROCEDURE — 3008F PR BODY MASS INDEX (BMI) DOCUMENTED: ICD-10-PCS | Mod: CPTII,S$GLB,, | Performed by: INTERNAL MEDICINE

## 2021-07-08 PROCEDURE — 99999 PR PBB SHADOW E&M-EST. PATIENT-LVL IV: CPT | Mod: PBBFAC,,, | Performed by: INTERNAL MEDICINE

## 2021-07-14 NOTE — PLAN OF CARE
Patient states readiness to go home and verbalizes understanding of discharge instructions.  
Questions encouraged and answered to decrease anxiety.  
[Follow-Up: _____] : a [unfilled] follow-up visit

## 2021-07-29 ENCOUNTER — HOSPITAL ENCOUNTER (OUTPATIENT)
Dept: RADIOLOGY | Facility: HOSPITAL | Age: 51
Discharge: HOME | End: 2021-07-29
Attending: PHYSICIAN ASSISTANT
Payer: MEDICARE

## 2021-07-29 ENCOUNTER — OFFICE VISIT (OUTPATIENT)
Dept: SPORTS MEDICINE | Facility: CLINIC | Age: 51
End: 2021-07-29
Payer: MEDICARE

## 2021-07-29 VITALS
SYSTOLIC BLOOD PRESSURE: 133 MMHG | WEIGHT: 221 LBS | DIASTOLIC BLOOD PRESSURE: 74 MMHG | HEART RATE: 68 BPM | BODY MASS INDEX: 30.94 KG/M2 | HEIGHT: 71 IN

## 2021-07-29 DIAGNOSIS — M25.562 LEFT KNEE PAIN, UNSPECIFIED CHRONICITY: ICD-10-CM

## 2021-07-29 DIAGNOSIS — M25.562 LEFT KNEE PAIN, UNSPECIFIED CHRONICITY: Primary | ICD-10-CM

## 2021-07-29 DIAGNOSIS — Z96.652 S/P TOTAL KNEE ARTHROPLASTY, LEFT: ICD-10-CM

## 2021-07-29 PROCEDURE — 1159F PR MEDICATION LIST DOCUMENTED IN MEDICAL RECORD: ICD-10-PCS | Mod: CPTII,S$GLB,, | Performed by: PHYSICIAN ASSISTANT

## 2021-07-29 PROCEDURE — 1160F PR REVIEW ALL MEDS BY PRESCRIBER/CLIN PHARMACIST DOCUMENTED: ICD-10-PCS | Mod: CPTII,S$GLB,, | Performed by: PHYSICIAN ASSISTANT

## 2021-07-29 PROCEDURE — 73562 XR KNEE ORTHO LEFT WITH FLEXION: ICD-10-PCS | Mod: 26,RT,, | Performed by: RADIOLOGY

## 2021-07-29 PROCEDURE — 1159F MED LIST DOCD IN RCRD: CPT | Mod: CPTII,S$GLB,, | Performed by: PHYSICIAN ASSISTANT

## 2021-07-29 PROCEDURE — 3078F PR MOST RECENT DIASTOLIC BLOOD PRESSURE < 80 MM HG: ICD-10-PCS | Mod: CPTII,S$GLB,, | Performed by: PHYSICIAN ASSISTANT

## 2021-07-29 PROCEDURE — 3008F BODY MASS INDEX DOCD: CPT | Mod: CPTII,S$GLB,, | Performed by: PHYSICIAN ASSISTANT

## 2021-07-29 PROCEDURE — 1126F AMNT PAIN NOTED NONE PRSNT: CPT | Mod: CPTII,S$GLB,, | Performed by: PHYSICIAN ASSISTANT

## 2021-07-29 PROCEDURE — 3078F DIAST BP <80 MM HG: CPT | Mod: CPTII,S$GLB,, | Performed by: PHYSICIAN ASSISTANT

## 2021-07-29 PROCEDURE — 99024 PR POST-OP FOLLOW-UP VISIT: ICD-10-PCS | Mod: S$GLB,,, | Performed by: PHYSICIAN ASSISTANT

## 2021-07-29 PROCEDURE — 73562 X-RAY EXAM OF KNEE 3: CPT | Mod: 26,RT,, | Performed by: RADIOLOGY

## 2021-07-29 PROCEDURE — 3075F PR MOST RECENT SYSTOLIC BLOOD PRESS GE 130-139MM HG: ICD-10-PCS | Mod: CPTII,S$GLB,, | Performed by: PHYSICIAN ASSISTANT

## 2021-07-29 PROCEDURE — 3075F SYST BP GE 130 - 139MM HG: CPT | Mod: CPTII,S$GLB,, | Performed by: PHYSICIAN ASSISTANT

## 2021-07-29 PROCEDURE — 73564 X-RAY EXAM KNEE 4 OR MORE: CPT | Mod: TC,LT

## 2021-07-29 PROCEDURE — 99999 PR PBB SHADOW E&M-EST. PATIENT-LVL IV: ICD-10-PCS | Mod: PBBFAC,,, | Performed by: PHYSICIAN ASSISTANT

## 2021-07-29 PROCEDURE — 1160F RVW MEDS BY RX/DR IN RCRD: CPT | Mod: CPTII,S$GLB,, | Performed by: PHYSICIAN ASSISTANT

## 2021-07-29 PROCEDURE — 99024 POSTOP FOLLOW-UP VISIT: CPT | Mod: S$GLB,,, | Performed by: PHYSICIAN ASSISTANT

## 2021-07-29 PROCEDURE — 1126F PR PAIN SEVERITY QUANTIFIED, NO PAIN PRESENT: ICD-10-PCS | Mod: CPTII,S$GLB,, | Performed by: PHYSICIAN ASSISTANT

## 2021-07-29 PROCEDURE — 73564 XR KNEE ORTHO LEFT WITH FLEXION: ICD-10-PCS | Mod: 26,LT,, | Performed by: RADIOLOGY

## 2021-07-29 PROCEDURE — 99999 PR PBB SHADOW E&M-EST. PATIENT-LVL IV: CPT | Mod: PBBFAC,,, | Performed by: PHYSICIAN ASSISTANT

## 2021-07-29 PROCEDURE — 73564 X-RAY EXAM KNEE 4 OR MORE: CPT | Mod: 26,LT,, | Performed by: RADIOLOGY

## 2021-07-29 PROCEDURE — 3008F PR BODY MASS INDEX (BMI) DOCUMENTED: ICD-10-PCS | Mod: CPTII,S$GLB,, | Performed by: PHYSICIAN ASSISTANT

## 2021-07-29 RX ORDER — CELECOXIB 200 MG/1
200 CAPSULE ORAL DAILY
Qty: 30 CAPSULE | Refills: 0 | Status: SHIPPED | OUTPATIENT
Start: 2021-07-29 | End: 2021-08-28

## 2021-09-24 ENCOUNTER — PATIENT MESSAGE (OUTPATIENT)
Dept: SPORTS MEDICINE | Facility: CLINIC | Age: 51
End: 2021-09-24

## 2021-09-30 DIAGNOSIS — R52 PAIN: Primary | ICD-10-CM

## 2021-10-13 ENCOUNTER — TELEPHONE (OUTPATIENT)
Dept: ORTHOPEDICS | Facility: CLINIC | Age: 51
End: 2021-10-13

## 2021-10-21 ENCOUNTER — OFFICE VISIT (OUTPATIENT)
Dept: ORTHOPEDICS | Facility: CLINIC | Age: 51
End: 2021-10-21
Payer: MEDICARE

## 2021-10-21 ENCOUNTER — HOSPITAL ENCOUNTER (OUTPATIENT)
Dept: RADIOLOGY | Facility: HOSPITAL | Age: 51
Discharge: HOME | End: 2021-10-21
Attending: ORTHOPAEDIC SURGERY
Payer: MEDICARE

## 2021-10-21 VITALS — WEIGHT: 220.88 LBS | BODY MASS INDEX: 30.92 KG/M2 | HEIGHT: 71 IN

## 2021-10-21 DIAGNOSIS — M79.641 PAIN OF RIGHT HAND: Primary | ICD-10-CM

## 2021-10-21 DIAGNOSIS — G56.03 BILATERAL CARPAL TUNNEL SYNDROME: ICD-10-CM

## 2021-10-21 DIAGNOSIS — M79.641 PAIN OF RIGHT HAND: ICD-10-CM

## 2021-10-21 PROCEDURE — 73120 X-RAY EXAM OF HAND: CPT | Mod: 26,50,, | Performed by: RADIOLOGY

## 2021-10-21 PROCEDURE — 3008F PR BODY MASS INDEX (BMI) DOCUMENTED: ICD-10-PCS | Mod: CPTII,S$GLB,, | Performed by: ORTHOPAEDIC SURGERY

## 2021-10-21 PROCEDURE — 73120 XR HAND 2 VIEW BILAT: ICD-10-PCS | Mod: 26,50,, | Performed by: RADIOLOGY

## 2021-10-21 PROCEDURE — 20526 PR INJECT CARPAL TUNNEL: ICD-10-PCS | Mod: RT,S$GLB,, | Performed by: ORTHOPAEDIC SURGERY

## 2021-10-21 PROCEDURE — 1160F PR REVIEW ALL MEDS BY PRESCRIBER/CLIN PHARMACIST DOCUMENTED: ICD-10-PCS | Mod: CPTII,S$GLB,, | Performed by: ORTHOPAEDIC SURGERY

## 2021-10-21 PROCEDURE — 1159F PR MEDICATION LIST DOCUMENTED IN MEDICAL RECORD: ICD-10-PCS | Mod: CPTII,S$GLB,, | Performed by: ORTHOPAEDIC SURGERY

## 2021-10-21 PROCEDURE — 99213 OFFICE O/P EST LOW 20 MIN: CPT | Mod: 25,S$GLB,, | Performed by: ORTHOPAEDIC SURGERY

## 2021-10-21 PROCEDURE — 3008F BODY MASS INDEX DOCD: CPT | Mod: CPTII,S$GLB,, | Performed by: ORTHOPAEDIC SURGERY

## 2021-10-21 PROCEDURE — 99999 PR PBB SHADOW E&M-EST. PATIENT-LVL IV: ICD-10-PCS | Mod: PBBFAC,,, | Performed by: ORTHOPAEDIC SURGERY

## 2021-10-21 PROCEDURE — 1160F RVW MEDS BY RX/DR IN RCRD: CPT | Mod: CPTII,S$GLB,, | Performed by: ORTHOPAEDIC SURGERY

## 2021-10-21 PROCEDURE — 20526 THER INJECTION CARP TUNNEL: CPT | Mod: RT,S$GLB,, | Performed by: ORTHOPAEDIC SURGERY

## 2021-10-21 PROCEDURE — 73120 X-RAY EXAM OF HAND: CPT | Mod: TC,50,PN

## 2021-10-21 PROCEDURE — 99213 PR OFFICE/OUTPT VISIT, EST, LEVL III, 20-29 MIN: ICD-10-PCS | Mod: 25,S$GLB,, | Performed by: ORTHOPAEDIC SURGERY

## 2021-10-21 PROCEDURE — 1159F MED LIST DOCD IN RCRD: CPT | Mod: CPTII,S$GLB,, | Performed by: ORTHOPAEDIC SURGERY

## 2021-10-21 PROCEDURE — 99999 PR PBB SHADOW E&M-EST. PATIENT-LVL IV: CPT | Mod: PBBFAC,,, | Performed by: ORTHOPAEDIC SURGERY

## 2021-10-21 RX ORDER — TRIAMCINOLONE ACETONIDE 40 MG/ML
20 INJECTION, SUSPENSION INTRA-ARTICULAR; INTRAMUSCULAR
Status: DISCONTINUED | OUTPATIENT
Start: 2021-10-21 | End: 2021-10-21 | Stop reason: HOSPADM

## 2021-10-21 RX ADMIN — TRIAMCINOLONE ACETONIDE 20 MG: 40 INJECTION, SUSPENSION INTRA-ARTICULAR; INTRAMUSCULAR at 10:10

## 2021-10-28 ENCOUNTER — TELEPHONE (OUTPATIENT)
Dept: SPORTS MEDICINE | Facility: CLINIC | Age: 51
End: 2021-10-28
Payer: MEDICARE

## 2021-11-18 ENCOUNTER — PATIENT MESSAGE (OUTPATIENT)
Dept: SPORTS MEDICINE | Facility: CLINIC | Age: 51
End: 2021-11-18
Payer: MEDICARE

## 2021-11-18 ENCOUNTER — TELEPHONE (OUTPATIENT)
Dept: SPORTS MEDICINE | Facility: CLINIC | Age: 51
End: 2021-11-18
Payer: MEDICARE

## 2021-11-19 ENCOUNTER — PATIENT MESSAGE (OUTPATIENT)
Dept: SPORTS MEDICINE | Facility: CLINIC | Age: 51
End: 2021-11-19
Payer: MEDICARE

## 2021-11-22 ENCOUNTER — HOSPITAL ENCOUNTER (OUTPATIENT)
Dept: RADIOLOGY | Facility: HOSPITAL | Age: 51
Discharge: HOME | End: 2021-11-22
Attending: PHYSICIAN ASSISTANT
Payer: MEDICARE

## 2021-11-22 ENCOUNTER — PATIENT MESSAGE (OUTPATIENT)
Dept: ORTHOPEDICS | Facility: CLINIC | Age: 51
End: 2021-11-22
Payer: MEDICARE

## 2021-11-22 ENCOUNTER — TELEPHONE (OUTPATIENT)
Dept: SPORTS MEDICINE | Facility: CLINIC | Age: 51
End: 2021-11-22
Payer: MEDICARE

## 2021-11-22 ENCOUNTER — OFFICE VISIT (OUTPATIENT)
Dept: SPORTS MEDICINE | Facility: CLINIC | Age: 51
End: 2021-11-22
Payer: MEDICARE

## 2021-11-22 VITALS
WEIGHT: 218.5 LBS | BODY MASS INDEX: 30.59 KG/M2 | HEART RATE: 71 BPM | SYSTOLIC BLOOD PRESSURE: 131 MMHG | HEIGHT: 71 IN | DIASTOLIC BLOOD PRESSURE: 62 MMHG

## 2021-11-22 DIAGNOSIS — Z96.652 S/P TOTAL KNEE ARTHROPLASTY, LEFT: ICD-10-CM

## 2021-11-22 DIAGNOSIS — M25.562 LEFT KNEE PAIN, UNSPECIFIED CHRONICITY: ICD-10-CM

## 2021-11-22 DIAGNOSIS — M25.562 LEFT KNEE PAIN, UNSPECIFIED CHRONICITY: Primary | ICD-10-CM

## 2021-11-22 PROCEDURE — 73564 X-RAY EXAM KNEE 4 OR MORE: CPT | Mod: TC,PN,LT

## 2021-11-22 PROCEDURE — 99999 PR PBB SHADOW E&M-EST. PATIENT-LVL IV: ICD-10-PCS | Mod: PBBFAC,,, | Performed by: PHYSICIAN ASSISTANT

## 2021-11-22 PROCEDURE — 99999 PR PBB SHADOW E&M-EST. PATIENT-LVL IV: CPT | Mod: PBBFAC,,, | Performed by: PHYSICIAN ASSISTANT

## 2021-11-22 PROCEDURE — 99213 OFFICE O/P EST LOW 20 MIN: CPT | Mod: S$GLB,,, | Performed by: PHYSICIAN ASSISTANT

## 2021-11-22 PROCEDURE — 99213 PR OFFICE/OUTPT VISIT, EST, LEVL III, 20-29 MIN: ICD-10-PCS | Mod: S$GLB,,, | Performed by: PHYSICIAN ASSISTANT

## 2021-11-22 PROCEDURE — 73564 XR KNEE ORTHO LEFT WITH FLEXION: ICD-10-PCS | Mod: 26,LT,, | Performed by: RADIOLOGY

## 2021-11-22 PROCEDURE — 73562 XR KNEE ORTHO LEFT WITH FLEXION: ICD-10-PCS | Mod: 26,RT,, | Performed by: RADIOLOGY

## 2021-11-22 PROCEDURE — 73562 X-RAY EXAM OF KNEE 3: CPT | Mod: 26,RT,, | Performed by: RADIOLOGY

## 2021-11-22 PROCEDURE — 73564 X-RAY EXAM KNEE 4 OR MORE: CPT | Mod: 26,LT,, | Performed by: RADIOLOGY

## 2021-11-26 ENCOUNTER — OFFICE VISIT (OUTPATIENT)
Dept: ORTHOPEDICS | Facility: CLINIC | Age: 51
End: 2021-11-26
Payer: MEDICARE

## 2021-11-26 DIAGNOSIS — G56.03 BILATERAL CARPAL TUNNEL SYNDROME: Primary | ICD-10-CM

## 2021-11-26 PROCEDURE — 99442 PR PHYSICIAN TELEPHONE EVALUATION 11-20 MIN: ICD-10-PCS | Mod: 95,,, | Performed by: ORTHOPAEDIC SURGERY

## 2021-11-26 PROCEDURE — 99442 PR PHYSICIAN TELEPHONE EVALUATION 11-20 MIN: CPT | Mod: 95,,, | Performed by: ORTHOPAEDIC SURGERY

## 2021-12-01 ENCOUNTER — PATIENT MESSAGE (OUTPATIENT)
Dept: OBSTETRICS AND GYNECOLOGY | Facility: CLINIC | Age: 51
End: 2021-12-01
Payer: MEDICARE

## 2021-12-01 ENCOUNTER — TELEPHONE (OUTPATIENT)
Dept: OBSTETRICS AND GYNECOLOGY | Facility: CLINIC | Age: 51
End: 2021-12-01
Payer: MEDICARE

## 2021-12-02 ENCOUNTER — OFFICE VISIT (OUTPATIENT)
Dept: OBSTETRICS AND GYNECOLOGY | Facility: CLINIC | Age: 51
End: 2021-12-02
Attending: OBSTETRICS & GYNECOLOGY
Payer: MEDICARE

## 2021-12-02 VITALS
BODY MASS INDEX: 30.4 KG/M2 | DIASTOLIC BLOOD PRESSURE: 84 MMHG | WEIGHT: 217.13 LBS | HEIGHT: 71 IN | SYSTOLIC BLOOD PRESSURE: 118 MMHG

## 2021-12-02 DIAGNOSIS — R35.0 FREQUENCY OF URINATION: ICD-10-CM

## 2021-12-02 DIAGNOSIS — R30.0 DYSURIA: Primary | ICD-10-CM

## 2021-12-02 PROCEDURE — 99213 PR OFFICE/OUTPT VISIT, EST, LEVL III, 20-29 MIN: ICD-10-PCS | Mod: S$GLB,,, | Performed by: OBSTETRICS & GYNECOLOGY

## 2021-12-02 PROCEDURE — 99999 PR PBB SHADOW E&M-EST. PATIENT-LVL III: CPT | Mod: PBBFAC,,, | Performed by: OBSTETRICS & GYNECOLOGY

## 2021-12-02 PROCEDURE — 87088 URINE BACTERIA CULTURE: CPT | Performed by: OBSTETRICS & GYNECOLOGY

## 2021-12-02 PROCEDURE — 87077 CULTURE AEROBIC IDENTIFY: CPT | Performed by: OBSTETRICS & GYNECOLOGY

## 2021-12-02 PROCEDURE — 87086 URINE CULTURE/COLONY COUNT: CPT | Performed by: OBSTETRICS & GYNECOLOGY

## 2021-12-02 PROCEDURE — 99999 PR PBB SHADOW E&M-EST. PATIENT-LVL III: ICD-10-PCS | Mod: PBBFAC,,, | Performed by: OBSTETRICS & GYNECOLOGY

## 2021-12-02 PROCEDURE — 99213 OFFICE O/P EST LOW 20 MIN: CPT | Mod: S$GLB,,, | Performed by: OBSTETRICS & GYNECOLOGY

## 2021-12-02 PROCEDURE — 87186 SC STD MICRODIL/AGAR DIL: CPT | Performed by: OBSTETRICS & GYNECOLOGY

## 2021-12-05 LAB — BACTERIA UR CULT: ABNORMAL

## 2021-12-06 ENCOUNTER — PATIENT MESSAGE (OUTPATIENT)
Dept: OBSTETRICS AND GYNECOLOGY | Facility: CLINIC | Age: 51
End: 2021-12-06
Payer: MEDICARE

## 2021-12-06 RX ORDER — SULFAMETHOXAZOLE AND TRIMETHOPRIM 400; 80 MG/1; MG/1
1 TABLET ORAL 2 TIMES DAILY
Qty: 14 TABLET | Refills: 0 | Status: SHIPPED | OUTPATIENT
Start: 2021-12-06 | End: 2021-12-13

## 2021-12-08 ENCOUNTER — PATIENT MESSAGE (OUTPATIENT)
Dept: SPORTS MEDICINE | Facility: CLINIC | Age: 51
End: 2021-12-08
Payer: MEDICARE

## 2021-12-10 ENCOUNTER — HOSPITAL ENCOUNTER (OUTPATIENT)
Dept: RADIOLOGY | Facility: HOSPITAL | Age: 51
Discharge: HOME | End: 2021-12-10
Attending: ORTHOPAEDIC SURGERY
Payer: MEDICARE

## 2021-12-10 ENCOUNTER — OFFICE VISIT (OUTPATIENT)
Dept: SPORTS MEDICINE | Facility: CLINIC | Age: 51
End: 2021-12-10
Payer: MEDICARE

## 2021-12-10 VITALS — BODY MASS INDEX: 30.38 KG/M2 | HEIGHT: 71 IN | WEIGHT: 217 LBS

## 2021-12-10 DIAGNOSIS — G89.29 CHRONIC MIDLINE LOW BACK PAIN WITHOUT SCIATICA: ICD-10-CM

## 2021-12-10 DIAGNOSIS — M54.50 CHRONIC MIDLINE LOW BACK PAIN WITHOUT SCIATICA: ICD-10-CM

## 2021-12-10 DIAGNOSIS — G89.29 CHRONIC PAIN OF LEFT KNEE: Primary | ICD-10-CM

## 2021-12-10 DIAGNOSIS — M25.562 LEFT KNEE PAIN, UNSPECIFIED CHRONICITY: ICD-10-CM

## 2021-12-10 DIAGNOSIS — M54.9 DORSALGIA, UNSPECIFIED: ICD-10-CM

## 2021-12-10 DIAGNOSIS — M25.562 CHRONIC PAIN OF LEFT KNEE: Primary | ICD-10-CM

## 2021-12-10 PROCEDURE — 99213 PR OFFICE/OUTPT VISIT, EST, LEVL III, 20-29 MIN: ICD-10-PCS | Mod: S$GLB,,, | Performed by: ORTHOPAEDIC SURGERY

## 2021-12-10 PROCEDURE — 72100 XR LUMBAR SPINE AP AND LATERAL: ICD-10-PCS | Mod: 26,,, | Performed by: RADIOLOGY

## 2021-12-10 PROCEDURE — 72100 X-RAY EXAM L-S SPINE 2/3 VWS: CPT | Mod: TC

## 2021-12-10 PROCEDURE — 73564 X-RAY EXAM KNEE 4 OR MORE: CPT | Mod: 26,LT,, | Performed by: RADIOLOGY

## 2021-12-10 PROCEDURE — 73564 XR KNEE ORTHO LEFT WITH FLEXION: ICD-10-PCS | Mod: 26,LT,, | Performed by: RADIOLOGY

## 2021-12-10 PROCEDURE — 73564 X-RAY EXAM KNEE 4 OR MORE: CPT | Mod: TC,LT

## 2021-12-10 PROCEDURE — 99213 OFFICE O/P EST LOW 20 MIN: CPT | Mod: S$GLB,,, | Performed by: ORTHOPAEDIC SURGERY

## 2021-12-10 PROCEDURE — 72100 X-RAY EXAM L-S SPINE 2/3 VWS: CPT | Mod: 26,,, | Performed by: RADIOLOGY

## 2021-12-10 PROCEDURE — 73562 X-RAY EXAM OF KNEE 3: CPT | Mod: 26,RT,, | Performed by: RADIOLOGY

## 2021-12-10 PROCEDURE — 99999 PR PBB SHADOW E&M-EST. PATIENT-LVL IV: ICD-10-PCS | Mod: PBBFAC,,, | Performed by: ORTHOPAEDIC SURGERY

## 2021-12-10 PROCEDURE — 99999 PR PBB SHADOW E&M-EST. PATIENT-LVL IV: CPT | Mod: PBBFAC,,, | Performed by: ORTHOPAEDIC SURGERY

## 2021-12-10 PROCEDURE — 73562 XR KNEE ORTHO LEFT WITH FLEXION: ICD-10-PCS | Mod: 26,RT,, | Performed by: RADIOLOGY

## 2021-12-10 RX ORDER — MELOXICAM 15 MG/1
15 TABLET ORAL DAILY
Qty: 30 TABLET | Refills: 2 | Status: SHIPPED | OUTPATIENT
Start: 2021-12-10 | End: 2022-09-19

## 2021-12-10 RX ORDER — METHYLPREDNISOLONE 4 MG/1
TABLET ORAL
Qty: 1 EACH | Refills: 0 | Status: SHIPPED | OUTPATIENT
Start: 2021-12-10 | End: 2022-03-29

## 2021-12-15 ENCOUNTER — PATIENT MESSAGE (OUTPATIENT)
Dept: CARDIOLOGY | Facility: CLINIC | Age: 51
End: 2021-12-15

## 2021-12-16 ENCOUNTER — PATIENT MESSAGE (OUTPATIENT)
Dept: SPORTS MEDICINE | Facility: CLINIC | Age: 51
End: 2021-12-16
Payer: MEDICARE
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
## 2021-12-21 ENCOUNTER — OFFICE VISIT (OUTPATIENT)
Dept: SPORTS MEDICINE | Facility: CLINIC | Age: 51
End: 2021-12-21
Payer: MEDICARE

## 2021-12-21 DIAGNOSIS — M54.50 CHRONIC MIDLINE LOW BACK PAIN WITHOUT SCIATICA: Primary | ICD-10-CM

## 2021-12-21 DIAGNOSIS — G89.29 CHRONIC MIDLINE LOW BACK PAIN WITHOUT SCIATICA: Primary | ICD-10-CM

## 2021-12-21 DIAGNOSIS — M54.16 LUMBAR RADICULOPATHY: ICD-10-CM

## 2021-12-21 PROCEDURE — 99213 OFFICE O/P EST LOW 20 MIN: CPT | Mod: 95,,, | Performed by: ORTHOPAEDIC SURGERY

## 2021-12-21 PROCEDURE — 99213 PR OFFICE/OUTPT VISIT, EST, LEVL III, 20-29 MIN: ICD-10-PCS | Mod: 95,,, | Performed by: ORTHOPAEDIC SURGERY

## 2021-12-22 ENCOUNTER — TELEPHONE (OUTPATIENT)
Dept: PAIN MEDICINE | Facility: CLINIC | Age: 51
End: 2021-12-22
Payer: MEDICARE

## 2022-01-04 ENCOUNTER — OFFICE VISIT (OUTPATIENT)
Dept: CARDIOLOGY | Facility: CLINIC | Age: 52
End: 2022-01-04
Payer: MEDICARE

## 2022-01-04 VITALS — DIASTOLIC BLOOD PRESSURE: 77 MMHG | HEART RATE: 73 BPM | SYSTOLIC BLOOD PRESSURE: 129 MMHG

## 2022-01-04 DIAGNOSIS — E78.00 PURE HYPERCHOLESTEROLEMIA: Primary | ICD-10-CM

## 2022-01-04 DIAGNOSIS — R00.2 PALPITATIONS: ICD-10-CM

## 2022-01-04 DIAGNOSIS — R07.89 CHEST PAIN, ATYPICAL: ICD-10-CM

## 2022-01-04 PROCEDURE — 1159F MED LIST DOCD IN RCRD: CPT | Mod: CPTII,S$GLB,, | Performed by: INTERNAL MEDICINE

## 2022-01-04 PROCEDURE — 1159F PR MEDICATION LIST DOCUMENTED IN MEDICAL RECORD: ICD-10-PCS | Mod: CPTII,S$GLB,, | Performed by: INTERNAL MEDICINE

## 2022-01-04 PROCEDURE — 3078F PR MOST RECENT DIASTOLIC BLOOD PRESSURE < 80 MM HG: ICD-10-PCS | Mod: CPTII,S$GLB,, | Performed by: INTERNAL MEDICINE

## 2022-01-04 PROCEDURE — 3074F PR MOST RECENT SYSTOLIC BLOOD PRESSURE < 130 MM HG: ICD-10-PCS | Mod: CPTII,S$GLB,, | Performed by: INTERNAL MEDICINE

## 2022-01-04 PROCEDURE — 93000 ELECTROCARDIOGRAM COMPLETE: CPT | Mod: S$GLB,,, | Performed by: INTERNAL MEDICINE

## 2022-01-04 PROCEDURE — 99999 PR PBB SHADOW E&M-EST. PATIENT-LVL III: CPT | Mod: PBBFAC,,, | Performed by: INTERNAL MEDICINE

## 2022-01-04 PROCEDURE — 99214 PR OFFICE/OUTPT VISIT, EST, LEVL IV, 30-39 MIN: ICD-10-PCS | Mod: S$GLB,,, | Performed by: INTERNAL MEDICINE

## 2022-01-04 PROCEDURE — 3074F SYST BP LT 130 MM HG: CPT | Mod: CPTII,S$GLB,, | Performed by: INTERNAL MEDICINE

## 2022-01-04 PROCEDURE — 3078F DIAST BP <80 MM HG: CPT | Mod: CPTII,S$GLB,, | Performed by: INTERNAL MEDICINE

## 2022-01-04 PROCEDURE — 99214 OFFICE O/P EST MOD 30 MIN: CPT | Mod: S$GLB,,, | Performed by: INTERNAL MEDICINE

## 2022-01-04 PROCEDURE — 93000 EKG 12-LEAD: ICD-10-PCS | Mod: S$GLB,,, | Performed by: INTERNAL MEDICINE

## 2022-01-04 PROCEDURE — 99999 PR PBB SHADOW E&M-EST. PATIENT-LVL III: ICD-10-PCS | Mod: PBBFAC,,, | Performed by: INTERNAL MEDICINE

## 2022-01-04 NOTE — PROGRESS NOTES
Subjective:    Patient ID:  Sindhu Verdin is a 51 y.o. female who presents for follow-up of No chief complaint on file.      HPI     CP, asthma, palpitations, HLD, AMARA     4/11/17 C - EDP 9, EF 60%, normal coronaries     Stress echo 5/20/20  · Normal left ventricular systolic function. The estimated ejection fraction is 55%.  · Normal LV diastolic function.  · Normal right ventricular systolic function.  · Mild tricuspid regurgitation.  · The estimated PA systolic pressure is 32 mmHg.  · The ECG portion of this study is negative for myocardial ischemia.  · The stress echo portion of this study is negative for myocardial ischemia.     Echo 1/17/18    1 - Normal left ventricular systolic function (EF 55-60%).     2 - Normal right ventricular systolic function .     3 - Normal left ventricular diastolic function.     4 - No wall motion abnormalities.     5.  Normal valvular function     Stress test 4/3/17  LVEF: 67 %  Impression: ABNORMAL MYOCARDIAL PERFUSION  1. There is evidence for moderate myocardial ischemia in the anterior wall of the left ventricle.   2. The perfusion scan is free of evidence for myocardial injury.   3. Resting wall motion is physiologic.   4. Resting LV function is normal.      Holter 1/17/18 - average HR 76, Few PVCs and PACS, no SVT or VT     8/22/18 Still gets occasional palpitations  Denies CP or SOB  EKG NSR - ok     9/5/19 Denies CP or SOB  EKG NSR - ok  Having some pains in her left lower back with numbness down left leg  Echo from 2016 showed mild AS - she has no cardiac murmur and no AS was seen on echo 1/17/18 - doubt she actually has AS  Cardiac stable  OV 1 year     Went to the ER 3/10/20  49-year-old complains of intermittent pain to her anterior chest for the last 2 days.  Patient was seen here 3 days ago secondary to a fall.  Patient injured her suprapubic area after she fell on to a trailer hitch.  At that ER visit patient had no chest pain or shortness of  breath.  It was the next day when her pain began.  She does feel occasional short of breath and feels like she cannot take a deep breath in.  She denies fever.  She does have an occasional cough.  She still notes swelling to her suprapubic area and has been taking hydrocodone for the pain.  Her appetite has been normal.  No fever.     EKG 3/10/20 NSR PVCs NSSTT changes     5/13/20 Still with exertional chest tightness and SOB   Stress echo for CP and SOB     5/27/20 CP improved - only with heavy lifting     8/14/20 Stable COLEMAN and atypical CP  Occasional palpitation  EKG NSR PVC - NSSTT changes - no change  BP well controlled     1/5/21 Still with atypical CP - thinks it's GERD  Denies recent palpitations  EKG NSR - ok    Continue Rx for atypical CP, palpitations, HLD  OV 6 months     7/8/21 Denies recent CP or SOB. Had TKR in May - still not exercising much  EKG 5/7/21 NSR - ok    Continue Rx for atypical CP, palpitations, HLD  OV 6 months    1/4/22 Denies Cp, mild COLEMAN. BP controlled  EKG NSR - ok    Review of Systems   Constitutional: Negative for decreased appetite.   HENT: Negative for ear discharge.    Eyes: Negative for blurred vision.   Respiratory: Negative for hemoptysis.    Endocrine: Negative for polyphagia.   Hematologic/Lymphatic: Negative for adenopathy.   Skin: Negative for color change.   Musculoskeletal: Negative for joint swelling.   Genitourinary: Negative for bladder incontinence.   Neurological: Negative for brief paralysis.   Psychiatric/Behavioral: Negative for hallucinations.   Allergic/Immunologic: Negative for hives.        Objective:    Physical Exam  Constitutional:       Appearance: She is well-developed.   HENT:      Head: Normocephalic and atraumatic.   Eyes:      Conjunctiva/sclera: Conjunctivae normal.      Pupils: Pupils are equal, round, and reactive to light.   Cardiovascular:      Rate and Rhythm: Normal rate.      Pulses: Intact distal pulses.      Heart sounds: Normal heart  sounds.   Pulmonary:      Effort: Pulmonary effort is normal.      Breath sounds: Normal breath sounds.   Abdominal:      General: Bowel sounds are normal.      Palpations: Abdomen is soft.   Musculoskeletal:         General: Normal range of motion.      Cervical back: Normal range of motion and neck supple.   Skin:     General: Skin is warm and dry.   Neurological:      Mental Status: She is alert and oriented to person, place, and time.           Assessment:       1. Pure hypercholesterolemia    2. Palpitations    3. Chest pain, atypical         Plan:         Continue Rx for atypical CP, palpitations, HLD  OV 1 year

## 2022-01-07 ENCOUNTER — LAB VISIT (OUTPATIENT)
Dept: FAMILY MEDICINE | Facility: CLINIC | Age: 52
End: 2022-01-07
Payer: MEDICARE

## 2022-01-07 DIAGNOSIS — Z11.52 ENCOUNTER FOR SCREENING FOR SEVERE ACUTE RESPIRATORY SYNDROME CORONAVIRUS 2 (SARS-COV-2) INFECTION: Primary | ICD-10-CM

## 2022-01-07 LAB
CTP QC/QA: YES
SARS-COV-2 AG RESP QL IA.RAPID: NEGATIVE

## 2022-01-07 PROCEDURE — 87811 SARS CORONAVIRUS 2 ANTIGEN POCT, MANUAL READ: ICD-10-PCS | Mod: S$GLB,,, | Performed by: FAMILY MEDICINE

## 2022-01-07 PROCEDURE — 87811 SARS-COV-2 COVID19 W/OPTIC: CPT | Mod: S$GLB,,, | Performed by: FAMILY MEDICINE

## 2022-01-07 NOTE — PROGRESS NOTES
This test is a lateral flow immunoassay intended for the qualitative detection of nucleocapsid protein antigen from SARS- CoV-2 within the first seven days of symptom onset.  Positive results indicate the presence of viral antigens, but clinical correlation with patient history and other diagnostic information is necessary to determine infection status.  Negative results from patients with symptom onset beyond seven days, should be treated as presumptive and confirmation with a molecular assay, if necessary, for patient management, may be performed. Negative results do not rule out SARS-CoV-2 infection and should not be used as the sole basis for treatment or patient management decisions, including infection control decisions.    This test is only for use under the Food and Drug Administration s Emergency Use Authorization (EUA). Commercial kits are provided by [employer].   _________________________________________________________________   The authorized Fact Sheet for Healthcare Providers and the authorized Fact   Sheet for Patients of the ID NOW COVID-19 are available on the FDA   website:   https://www.fda.gov/media/804620/download  https://www.fda.gov/media/698421/download

## 2022-02-02 ENCOUNTER — OFFICE VISIT (OUTPATIENT)
Dept: OBSTETRICS AND GYNECOLOGY | Facility: CLINIC | Age: 52
End: 2022-02-02
Attending: OBSTETRICS & GYNECOLOGY
Payer: MEDICARE

## 2022-02-02 VITALS
DIASTOLIC BLOOD PRESSURE: 80 MMHG | HEIGHT: 71 IN | SYSTOLIC BLOOD PRESSURE: 130 MMHG | BODY MASS INDEX: 30.77 KG/M2 | WEIGHT: 219.81 LBS

## 2022-02-02 DIAGNOSIS — Z01.419 WELL WOMAN EXAM: Primary | ICD-10-CM

## 2022-02-02 PROCEDURE — 3079F DIAST BP 80-89 MM HG: CPT | Mod: CPTII,S$GLB,, | Performed by: OBSTETRICS & GYNECOLOGY

## 2022-02-02 PROCEDURE — 1160F RVW MEDS BY RX/DR IN RCRD: CPT | Mod: CPTII,S$GLB,, | Performed by: OBSTETRICS & GYNECOLOGY

## 2022-02-02 PROCEDURE — 3079F PR MOST RECENT DIASTOLIC BLOOD PRESSURE 80-89 MM HG: ICD-10-PCS | Mod: CPTII,S$GLB,, | Performed by: OBSTETRICS & GYNECOLOGY

## 2022-02-02 PROCEDURE — 1159F PR MEDICATION LIST DOCUMENTED IN MEDICAL RECORD: ICD-10-PCS | Mod: CPTII,S$GLB,, | Performed by: OBSTETRICS & GYNECOLOGY

## 2022-02-02 PROCEDURE — G0101 CA SCREEN;PELVIC/BREAST EXAM: HCPCS | Mod: S$GLB,,, | Performed by: OBSTETRICS & GYNECOLOGY

## 2022-02-02 PROCEDURE — 3008F BODY MASS INDEX DOCD: CPT | Mod: CPTII,S$GLB,, | Performed by: OBSTETRICS & GYNECOLOGY

## 2022-02-02 PROCEDURE — 3075F SYST BP GE 130 - 139MM HG: CPT | Mod: CPTII,S$GLB,, | Performed by: OBSTETRICS & GYNECOLOGY

## 2022-02-02 PROCEDURE — 3008F PR BODY MASS INDEX (BMI) DOCUMENTED: ICD-10-PCS | Mod: CPTII,S$GLB,, | Performed by: OBSTETRICS & GYNECOLOGY

## 2022-02-02 PROCEDURE — 1159F MED LIST DOCD IN RCRD: CPT | Mod: CPTII,S$GLB,, | Performed by: OBSTETRICS & GYNECOLOGY

## 2022-02-02 PROCEDURE — G0101 PR CA SCREEN;PELVIC/BREAST EXAM: ICD-10-PCS | Mod: S$GLB,,, | Performed by: OBSTETRICS & GYNECOLOGY

## 2022-02-02 PROCEDURE — 3075F PR MOST RECENT SYSTOLIC BLOOD PRESS GE 130-139MM HG: ICD-10-PCS | Mod: CPTII,S$GLB,, | Performed by: OBSTETRICS & GYNECOLOGY

## 2022-02-02 PROCEDURE — 99999 PR PBB SHADOW E&M-EST. PATIENT-LVL II: ICD-10-PCS | Mod: PBBFAC,,, | Performed by: OBSTETRICS & GYNECOLOGY

## 2022-02-02 PROCEDURE — 1160F PR REVIEW ALL MEDS BY PRESCRIBER/CLIN PHARMACIST DOCUMENTED: ICD-10-PCS | Mod: CPTII,S$GLB,, | Performed by: OBSTETRICS & GYNECOLOGY

## 2022-02-02 PROCEDURE — 99999 PR PBB SHADOW E&M-EST. PATIENT-LVL II: CPT | Mod: PBBFAC,,, | Performed by: OBSTETRICS & GYNECOLOGY

## 2022-02-02 NOTE — PROGRESS NOTES
CC: Well woman exam    Sindhu Verdin is a 51 y.o. female  status post hyst presents for a well woman exam.  No current issues, problems, or complaints.        No history of abnormal pap.  No ERT  MMG due       Past Medical History:   Diagnosis Date    Asthma     GERD (gastroesophageal reflux disease)     Hyperlipidemia     IBS (irritable bowel syndrome)     Primary osteoarthritis of left knee 2021    Sleep apnea     Tension headache, chronic     Vaginal delivery     x3     Past Surgical History:   Procedure Laterality Date    BUNIONECTOMY Bilateral     CHOLECYSTECTOMY      CHONDROPLASTY OF KNEE Left 2021    Procedure: CHONDROPLASTY, KNEE;  Surgeon: ADALBERTO Hill MD;  Location: [facility] OR;  Service: Orthopedics;  Laterality: Left;    COLONOSCOPY N/A 2018    Procedure: COLONOSCOPY;  Surgeon: Deb Bolivar MD;  Location: [facility] ENDO;  Service: Endoscopy;  Laterality: N/A;    HYSTERECTOMY      KNEE ARTHROSCOPY W/ MENISCECTOMY Left 2021    Procedure: ARTHROSCOPY, KNEE, WITH MENISCECTOMY PARTIAL AND LATERAL;  Surgeon: ADALBERTO Hill MD;  Location: [facility] OR;  Service: Orthopedics;  Laterality: Left;  regional with catheter- adductor  pericapsular injection: 30cc ropivacaine/epi/clonidine/ketorolac injection    SUBCHONDROPLASTY Left 2021    Procedure: SUBCHONDROPLASTY, TIBIA, FEMUR;  Surgeon: ADALBERTO Hill MD;  Location: [facility] OR;  Service: Orthopedics;  Laterality: Left;    SYNOVECTOMY OF KNEE Left 2021    Procedure: SYNOVECTOMY, KNEE;  Surgeon: ADALBERTO Hill MD;  Location: [facility] OR;  Service: Orthopedics;  Laterality: Left;    TOTAL KNEE ARTHROPLASTY Left 2021    Procedure: ARTHROPLASTY, KNEE, TOTAL;  Surgeon: ADALBERTO Hill MD;  Location: [facility] OR;  Service: Orthopedics;  Laterality: Left;  outpatient with 23hr observation  regional with catheter- spinal & adductor     Family History   Problem Relation Age of Onset    Heart attack  "Father         at 70     Lymphoma Mother     Cataracts Mother     Heart disease Sister     No Known Problems Brother     No Known Problems Maternal Aunt     No Known Problems Maternal Uncle     No Known Problems Paternal Aunt     No Known Problems Paternal Uncle     No Known Problems Maternal Grandmother     No Known Problems Maternal Grandfather     No Known Problems Paternal Grandmother     No Known Problems Paternal Grandfather     Breast cancer Neg Hx     Colon cancer Neg Hx     Ovarian cancer Neg Hx     Amblyopia Neg Hx     Blindness Neg Hx     Cancer Neg Hx     Diabetes Neg Hx     Glaucoma Neg Hx     Hypertension Neg Hx     Macular degeneration Neg Hx     Retinal detachment Neg Hx     Strabismus Neg Hx     Stroke Neg Hx     Thyroid disease Neg Hx      Social History     Tobacco Use    Smoking status: Former Smoker     Years: 15.00     Types: Cigarettes     Quit date:      Years since quittin.1    Smokeless tobacco: Never Used    Tobacco comment: quit when she was 33 yrs old.   Substance Use Topics    Alcohol use: No     Comment: rarely    Drug use: No     OB History        3    Para   3    Term   3            AB        Living   3       SAB        IAB        Ectopic        Multiple        Live Births                     /80   Ht 5' 11" (1.803 m)   Wt 99.7 kg (219 lb 12.8 oz)   LMP  (LMP Unknown)   BMI 30.66 kg/m²     ROS:    GENERAL: Denies weight gain or weight loss. Feeling well overall.   SKIN: Denies rash or lesions.   HEAD: Denies head injury or headache.   NODES: Denies enlarged lymph nodes.   CHEST: Denies chest pain or shortness of breath.   CARDIOVASCULAR: Denies palpitations or left sided chest pain.   ABDOMEN: No abdominal pain, constipation, diarrhea, nausea, vomiting or rectal bleeding.   URINARY: No frequency, dysuria, hematuria, or burning on urination.  REPRODUCTIVE: See HPI.   BREASTS: The patient performs breast self-examination " and denies pain, lumps, or nipple discharge.   HEMATOLOGIC: No easy bruisability or excessive bleeding.   MUSCULOSKELETAL: Denies joint pain or swelling.   NEUROLOGIC: Denies syncope or weakness.   PSYCHIATRIC: Denies depression, anxiety or mood swings.      PHYSICAL EXAM:     APPEARANCE: Well nourished, well developed, in no acute distress.  AFFECT: WNL, alert and oriented x 3.  SKIN: No acne or hirsutism.  NECK: Neck symmetric without masses or thyromegaly.  NODES: No inguinal, cervical, axillary or femoral lymph node enlargement.  CHEST: Good respiratory effort.   ABDOMEN: Soft. No tenderness or masses. No hepatosplenomegaly. No hernias.  BREASTS: Symmetrical, no skin changes or visible lesions. No palpable masses, nipple discharge bilaterally.  PELVIC: Normal external female genitalia without lesions. Normal hair distribution. Adequate perineal body, normal urethral meatus. Vagina atrophic without lesions or discharge. No significant cystocele or rectocele. Bimanual exam shows uterus and cervix to be surgically absent. Adnexa without masses or tenderness.  EXTREMITIES: No edema.    ASSESSMENT & PLAN    ICD-10-CM ICD-9-CM    1. Well woman exam  Z01.419 V72.31             Patient was counseled today on A.C.S. Pap guidelines and recommendations for yearly pelvic exams, mammograms and monthly self breast exams; to see her PCP for other health maintenance.

## 2022-02-08 ENCOUNTER — OFFICE VISIT (OUTPATIENT)
Dept: ORTHOPEDICS | Facility: CLINIC | Age: 52
End: 2022-02-08
Payer: MEDICARE

## 2022-02-08 VITALS — BODY MASS INDEX: 30.66 KG/M2 | WEIGHT: 219 LBS | HEIGHT: 71 IN

## 2022-02-08 DIAGNOSIS — Z41.9 ELECTIVE SURGERY: Primary | ICD-10-CM

## 2022-02-08 DIAGNOSIS — G56.01 CARPAL TUNNEL SYNDROME OF RIGHT WRIST: ICD-10-CM

## 2022-02-08 PROCEDURE — 3008F PR BODY MASS INDEX (BMI) DOCUMENTED: ICD-10-PCS | Mod: CPTII,S$GLB,, | Performed by: ORTHOPAEDIC SURGERY

## 2022-02-08 PROCEDURE — 3008F BODY MASS INDEX DOCD: CPT | Mod: CPTII,S$GLB,, | Performed by: ORTHOPAEDIC SURGERY

## 2022-02-08 PROCEDURE — 99214 PR OFFICE/OUTPT VISIT, EST, LEVL IV, 30-39 MIN: ICD-10-PCS | Mod: S$GLB,,, | Performed by: ORTHOPAEDIC SURGERY

## 2022-02-08 PROCEDURE — 99999 PR PBB SHADOW E&M-EST. PATIENT-LVL IV: CPT | Mod: PBBFAC,,, | Performed by: ORTHOPAEDIC SURGERY

## 2022-02-08 PROCEDURE — 1159F MED LIST DOCD IN RCRD: CPT | Mod: CPTII,S$GLB,, | Performed by: ORTHOPAEDIC SURGERY

## 2022-02-08 PROCEDURE — 99214 OFFICE O/P EST MOD 30 MIN: CPT | Mod: S$GLB,,, | Performed by: ORTHOPAEDIC SURGERY

## 2022-02-08 PROCEDURE — 99999 PR PBB SHADOW E&M-EST. PATIENT-LVL IV: ICD-10-PCS | Mod: PBBFAC,,, | Performed by: ORTHOPAEDIC SURGERY

## 2022-02-08 PROCEDURE — 1159F PR MEDICATION LIST DOCUMENTED IN MEDICAL RECORD: ICD-10-PCS | Mod: CPTII,S$GLB,, | Performed by: ORTHOPAEDIC SURGERY

## 2022-02-08 RX ORDER — EPINEPHRINE 0.3 MG/.3ML
INJECTION SUBCUTANEOUS
COMMUNITY
Start: 2021-11-12

## 2022-02-08 NOTE — PROGRESS NOTES
CC:  Right carpal tunnel syndrome and mucous cyst right thumb        HPI:  Sindhu Verdin is a very pleasant 51 y.o. female with ongoing symptoms right hand for the past 6 months to year  She has been treated for carpal tunnel syndrome in the past including splinting and the cage in lieu injection with temporary improvement  Previous nerve test showed borderline right carpal tunnel syndrome  More recently she has had painful swelling of the right thumb consistent with a mucous cyst  She would like to consider surgery for the right hand           PAST MEDICAL HISTORY:   Past Medical History:   Diagnosis Date    Asthma     GERD (gastroesophageal reflux disease)     Hyperlipidemia     IBS (irritable bowel syndrome)     Primary osteoarthritis of left knee 5/5/2021    Sleep apnea     Tension headache, chronic     Vaginal delivery     x3     PAST SURGICAL HISTORY:   Past Surgical History:   Procedure Laterality Date    BUNIONECTOMY Bilateral     CHOLECYSTECTOMY      CHONDROPLASTY OF KNEE Left 2/12/2021    Procedure: CHONDROPLASTY, KNEE;  Surgeon: ADALBERTO Hill MD;  Location: [facility];  Service: Orthopedics;  Laterality: Left;    COLONOSCOPY N/A 8/17/2018    Procedure: COLONOSCOPY;  Surgeon: Deb Bolivar MD;  Location: [facility];  Service: Endoscopy;  Laterality: N/A;    HYSTERECTOMY      KNEE ARTHROSCOPY W/ MENISCECTOMY Left 2/12/2021    Procedure: ARTHROSCOPY, KNEE, WITH MENISCECTOMY PARTIAL AND LATERAL;  Surgeon: ADALBERTO Hill MD;  Location: [facility] OR;  Service: Orthopedics;  Laterality: Left;  regional with catheter- adductor  pericapsular injection: 30cc ropivacaine/epi/clonidine/ketorolac injection    SUBCHONDROPLASTY Left 2/12/2021    Procedure: SUBCHONDROPLASTY, TIBIA, FEMUR;  Surgeon: ADALBERTO Hill MD;  Location: [facility];  Service: Orthopedics;  Laterality: Left;    SYNOVECTOMY OF KNEE Left 2/12/2021    Procedure: SYNOVECTOMY, KNEE;  Surgeon: ADALBERTO Hill MD;   Location: [facility] OR;  Service: Orthopedics;  Laterality: Left;    TOTAL KNEE ARTHROPLASTY Left 2021    Procedure: ARTHROPLASTY, KNEE, TOTAL;  Surgeon: ADALBERTO Hill MD;  Location: [facility] OR;  Service: Orthopedics;  Laterality: Left;  outpatient with 23hr observation  regional with catheter- spinal & adductor     FAMILY HISTORY:   Family History   Problem Relation Age of Onset    Heart attack Father         at 70     Lymphoma Mother     Cataracts Mother     Heart disease Sister     No Known Problems Brother     No Known Problems Maternal Aunt     No Known Problems Maternal Uncle     No Known Problems Paternal Aunt     No Known Problems Paternal Uncle     No Known Problems Maternal Grandmother     No Known Problems Maternal Grandfather     No Known Problems Paternal Grandmother     No Known Problems Paternal Grandfather     Breast cancer Neg Hx     Colon cancer Neg Hx     Ovarian cancer Neg Hx     Amblyopia Neg Hx     Blindness Neg Hx     Cancer Neg Hx     Diabetes Neg Hx     Glaucoma Neg Hx     Hypertension Neg Hx     Macular degeneration Neg Hx     Retinal detachment Neg Hx     Strabismus Neg Hx     Stroke Neg Hx     Thyroid disease Neg Hx      SOCIAL HISTORY:   Social History     Socioeconomic History    Marital status:    Tobacco Use    Smoking status: Former Smoker     Years: 15.00     Types: Cigarettes     Quit date:      Years since quittin.1    Smokeless tobacco: Never Used    Tobacco comment: quit when she was 33 yrs old.   Substance and Sexual Activity    Alcohol use: No     Comment: rarely    Drug use: No    Sexual activity: Yes     Partners: Male     Birth control/protection: Surgical       MEDICATIONS:   Current Outpatient Medications:     aspirin (ECOTRIN) 81 MG EC tablet, Take 1 tablet (81 mg total) by mouth 2 (two) times daily., Disp: 56 tablet, Rfl: 0    celecoxib (CELEBREX) 200 MG capsule, TAKE 1 CAPSULE(200 MG) BY MOUTH EVERY DAY, Disp: 30  capsule, Rfl: 1    cholecalciferol, vitamin D3, (VITAMIN D3) 50 mcg (2,000 unit) Cap, TK 1 C PO ONCE D, Disp: , Rfl:     diazePAM (VALIUM) 5 MG tablet, Take 1 tablet (5 mg total) by mouth every evening. for 20 days, Disp: 20 tablet, Rfl: 0    EPINEPHrine (EPIPEN) 0.3 mg/0.3 mL AtIn, Inject into the muscle., Disp: , Rfl:     ezetimibe (ZETIA) 10 mg tablet, TK 1 T PO D, Disp: , Rfl:     gabapentin (NEURONTIN) 300 MG capsule, Take 1 capsule (300 mg total) by mouth 3 (three) times daily., Disp: 90 capsule, Rfl: 3    HYDROcodone-acetaminophen (NORCO)  mg per tablet, Take 1 tablet by mouth., Disp: , Rfl:     imipramine (TOFRANIL) 25 MG tablet, Take 1 tablet (25 mg total) by mouth every evening., Disp: 30 tablet, Rfl: 11    LINZESS 290 mcg Cap, TK ONE C PO QD ON EMPTY STOMACH, Disp: , Rfl: 2    magnesium oxide (MAG-OX) 400 mg (241.3 mg magnesium) tablet, Take 1 tablet (400 mg total) by mouth once daily., Disp: 90 tablet, Rfl: 3    meloxicam (MOBIC) 15 MG tablet, Take 1 tablet (15 mg total) by mouth once daily., Disp: 30 tablet, Rfl: 2    methylPREDNISolone (MEDROL DOSEPACK) 4 mg tablet, use as directed, Disp: 1 each, Rfl: 0    metoclopramide HCl (REGLAN) 10 MG tablet, Take 1 tablet by mouth before meals and at bedtime as needed., Disp: , Rfl:     metoprolol succinate (TOPROL-XL) 25 MG 24 hr tablet, Take 1 tablet (25 mg total) by mouth once daily., Disp: 90 tablet, Rfl: 3    naproxen (NAPROSYN) 500 MG tablet, Take 1 tablet (500 mg total) by mouth 2 (two) times daily., Disp: 30 tablet, Rfl: 1    oxybutynin (DITROPAN XL) 15 MG TR24, TK 1 T PO BID, Disp: , Rfl: 0    PROAIR HFA 90 mcg/actuation inhaler, Inhale 1 puff into the lungs every 4 (four) hours as needed. , Disp: , Rfl: 0    SYMBICORT 160-4.5 mcg/actuation HFAA, Inhale 2 puffs into the lungs once daily. , Disp: , Rfl: 0    traMADoL (ULTRAM) 50 mg tablet, Take 50 mg by mouth every 6 (six) hours., Disp: , Rfl:   No current facility-administered  "medications for this visit.    Facility-Administered Medications Ordered in Other Visits:     0.9%  NaCl infusion, , Intravenous, Continuous, Deb Bolivar MD, Last Rate: 20 mL/hr at 08/17/18 0904, New Bag at 08/17/18 0904    fentaNYL injection 25 mcg, 25 mcg, Intravenous, Q5 Min PRN, Pam Siddiqui MD    midazolam (VERSED) 1 mg/mL injection 0.5 mg, 0.5 mg, Intravenous, PRN, Pam Siddiqui MD, 2 mg at 02/12/21 1053    sodium chloride 0.9% flush 3 mL, 3 mL, Intravenous, PRN, Deb Bolivar MD  ALLERGIES:   Review of patient's allergies indicates:   Allergen Reactions    Lipitor [atorvastatin]      hair loss    Statins-hmg-coa reductase inhibitors      Hair loss       Review of Systems:  Constitutional: no fever or chills  ENT: no nasal congestion or sore throat  Respiratory: no cough or shortness of breath  Cardiovascular: no chest pain or palpitations  Gastrointestinal: no nausea or vomiting, PUD, GERD, NSAID intolerance  Genitourinary: no hematuria or dysuria  Integument/Breast: no rash or pruritis  Hematologic/Lymphatic: no easy bruising or lymphadenopathy  Musculoskeletal: see HPI  Neurological: no seizures or tremors  Behavioral/Psych: no auditory or visual hallucinations      Physical Exam   Vitals:    02/08/22 1052   Weight: 99.3 kg (219 lb)   Height: 5' 11" (1.803 m)   PainSc:   8       Constitutional: Oriented to person, place, and time. Appears well-developed and well-nourished.   HENT:   Head: Normocephalic and atraumatic.   Nose: Nose normal.   Eyes: No scleral icterus.   Neck: Normal range of motion.   Cardiovascular: Normal rate and regular rhythm.    Pulses:       Radial pulses are 2+ on the right side, and 2+ on the left side.   Pulmonary/Chest: Effort normal and breath sounds normal.   Abdominal: Soft.   Neurological: Alert and oriented to person, place, and time.   Skin: Skin is warm.   Psychiatric: Normal mood and affect.     MUSCULOSKELETAL UPPER EXTREMITY:  Examination " "right hand demonstrate mild swelling positive Tinel sign  Negative Phalen's test  Range of motion wrist fingers full  There is also tenderness over the dorsum of the right thumb IP joint consistent with a small mucous cyst  Tender to touch  No open wound no drainage  No nail bed deformity            Diagnostic Studies:  AP lateral x-ray of the right thumb demonstrate a small loose body over the dorsal aspect of the IP joint  Previous nerve conduction studies show evidence of borderline right carpal tunnel syndrome      Assessment:  1. Right carpal tunnel syndrome.    2. Mucous cyst right thumb symptomatic     Plan:  The patient has failed non operative treatment and even though her nerve test is borderline.  I think her symptoms do warrant surgical treatment at this point and  she would like to proceed with right carpal tunnel release as an outpatient surgery  We can also remove the cyst from her right thumb with excisional biopsy  The risks and benefits of surgery explained to the patient she understands        The risks and benefits of surgery were discussed with the patient today and they understand.  The consent was signed in the office for surgery.      Favian Correia MD (Jay)  [facility]  Orthopedic Upper Extremity Surgery      "

## 2022-03-15 ENCOUNTER — LAB VISIT (OUTPATIENT)
Dept: FAMILY MEDICINE | Facility: CLINIC | Age: 52
End: 2022-03-15
Payer: MEDICARE

## 2022-03-15 DIAGNOSIS — Z41.9 ELECTIVE SURGERY: ICD-10-CM

## 2022-03-15 PROCEDURE — U0003 INFECTIOUS AGENT DETECTION BY NUCLEIC ACID (DNA OR RNA); SEVERE ACUTE RESPIRATORY SYNDROME CORONAVIRUS 2 (SARS-COV-2) (CORONAVIRUS DISEASE [COVID-19]), AMPLIFIED PROBE TECHNIQUE, MAKING USE OF HIGH THROUGHPUT TECHNOLOGIES AS DESCRIBED BY CMS-2020-01-R: HCPCS | Performed by: ORTHOPAEDIC SURGERY

## 2022-03-15 PROCEDURE — U0005 INFEC AGEN DETEC AMPLI PROBE: HCPCS | Performed by: ORTHOPAEDIC SURGERY

## 2022-03-16 LAB
SARS-COV-2 RNA RESP QL NAA+PROBE: NOT DETECTED
SARS-COV-2- CYCLE NUMBER: NORMAL

## 2022-03-16 NOTE — PROGRESS NOTES
CC: Right knee pain    DATE OF PROCEDURE: 5/5/2021   PROCEDURES PERFORMED:   Left total knee arthroplasty (CPT 68585)    51 y.o. Female who presents as a known patient to me. Complaint is chronic right knee pain. Recently patient has reported falling last week and this morning due to knee instability. Pain localizes medially.  Denies swelling or effusions. She also reports intermittent numbness and tingling going down to her toes. No prominent mechanical symptoms. Worse with prolonged sitting. Better with rest. Treatment thus far has included rest, activity modifications, oral medications. Here today to discuss diagnosis and treatment options.      Doing okay with her left knee.  She does have chronic low back pain.  No radicular symptoms at this time.    Her home living situation has improved to some extent.  Has appropriate shelter and is sleeping in a bed.    Negative for tobacco.   Negative for diabetes.     REVIEW OF SYSTEMS:   Constitution: Negative. Negative for chills, fever and night sweats.    Hematologic/Lymphatic: Negative for bleeding problem. Does not bruise/bleed easily.   Skin: Negative for dry skin, itching and rash.   Musculoskeletal: Negative for falls. Positive for right knee pain and muscle weakness.     All other review of symptoms were reviewed and found to be noncontributory.     PAST MEDICAL HISTORY:   Past Medical History:   Diagnosis Date    Asthma     GERD (gastroesophageal reflux disease)     Hyperlipidemia     IBS (irritable bowel syndrome)     Primary osteoarthritis of left knee 5/5/2021    Sleep apnea     Tension headache, chronic     Vaginal delivery     x3       PAST SURGICAL HISTORY:   Past Surgical History:   Procedure Laterality Date    BUNIONECTOMY Bilateral     CARPAL TUNNEL RELEASE Right 3/18/2022    Procedure: RELEASE, CARPAL TUNNEL;  Surgeon: Favian Correia Jr., MD;  Location: [facility] OR;  Service: Orthopedics;  Laterality: Right;    CHOLECYSTECTOMY       CHONDROPLASTY OF KNEE Left 2/12/2021    Procedure: CHONDROPLASTY, KNEE;  Surgeon: ADALBERTO Hill MD;  Location: [facility] OR;  Service: Orthopedics;  Laterality: Left;    COLONOSCOPY N/A 8/17/2018    Procedure: COLONOSCOPY;  Surgeon: Deb Bolivar MD;  Location: [facility] ENDO;  Service: Endoscopy;  Laterality: N/A;    EXCISION OF GANGLION CYST OF HAND Right 3/18/2022    Procedure: EXCISION, GANGLION CYST, HAND;  Surgeon: Favian Correia Jr., MD;  Location: [facility] OR;  Service: Orthopedics;  Laterality: Right;  right thumb    HYSTERECTOMY      KNEE ARTHROSCOPY W/ MENISCECTOMY Left 2/12/2021    Procedure: ARTHROSCOPY, KNEE, WITH MENISCECTOMY PARTIAL AND LATERAL;  Surgeon: ADALBERTO Hill MD;  Location: [facility] OR;  Service: Orthopedics;  Laterality: Left;  regional with catheter- adductor  pericapsular injection: 30cc ropivacaine/epi/clonidine/ketorolac injection    SUBCHONDROPLASTY Left 2/12/2021    Procedure: SUBCHONDROPLASTY, TIBIA, FEMUR;  Surgeon: ADALBERTO Hill MD;  Location: [facility] OR;  Service: Orthopedics;  Laterality: Left;    SYNOVECTOMY OF KNEE Left 2/12/2021    Procedure: SYNOVECTOMY, KNEE;  Surgeon: ADALBERTO Hill MD;  Location: [facility] OR;  Service: Orthopedics;  Laterality: Left;    TOTAL KNEE ARTHROPLASTY Left 5/5/2021    Procedure: ARTHROPLASTY, KNEE, TOTAL;  Surgeon: ADALBERTO Hill MD;  Location: [facility] OR;  Service: Orthopedics;  Laterality: Left;  outpatient with 23hr observation  regional with catheter- spinal & adductor       FAMILY HISTORY:   Family History   Problem Relation Age of Onset    Heart attack Father         at 70     Lymphoma Mother     Cataracts Mother     Heart disease Sister     No Known Problems Brother     No Known Problems Maternal Aunt     No Known Problems Maternal Uncle     No Known Problems Paternal Aunt     No Known Problems Paternal Uncle     No Known Problems Maternal Grandmother     No Known Problems Maternal Grandfather     No Known Problems  Paternal Grandmother     No Known Problems Paternal Grandfather     Breast cancer Neg Hx     Colon cancer Neg Hx     Ovarian cancer Neg Hx     Amblyopia Neg Hx     Blindness Neg Hx     Cancer Neg Hx     Diabetes Neg Hx     Glaucoma Neg Hx     Hypertension Neg Hx     Macular degeneration Neg Hx     Retinal detachment Neg Hx     Strabismus Neg Hx     Stroke Neg Hx     Thyroid disease Neg Hx        SOCIAL HISTORY:   Social History     Socioeconomic History    Marital status:    Tobacco Use    Smoking status: Former Smoker     Years: 15.00     Types: Cigarettes     Quit date:      Years since quittin.2    Smokeless tobacco: Never Used    Tobacco comment: quit when she was 33 yrs old.   Substance and Sexual Activity    Alcohol use: No     Comment: rarely    Drug use: No    Sexual activity: Yes     Partners: Male     Birth control/protection: Surgical       MEDICATIONS:     Current Outpatient Medications:     aspirin (ECOTRIN) 81 MG EC tablet, Take 1 tablet (81 mg total) by mouth 2 (two) times daily., Disp: 56 tablet, Rfl: 0    celecoxib (CELEBREX) 200 MG capsule, TAKE 1 CAPSULE(200 MG) BY MOUTH EVERY DAY, Disp: 30 capsule, Rfl: 1    cholecalciferol, vitamin D3, (VITAMIN D3) 50 mcg (2,000 unit) Cap, TK 1 C PO ONCE D, Disp: , Rfl:     diazePAM (VALIUM) 5 MG tablet, Take 1 tablet (5 mg total) by mouth every evening. for 20 days, Disp: 20 tablet, Rfl: 0    EPINEPHrine (EPIPEN) 0.3 mg/0.3 mL AtIn, Inject into the muscle., Disp: , Rfl:     ezetimibe (ZETIA) 10 mg tablet, TK 1 T PO D, Disp: , Rfl:     gabapentin (NEURONTIN) 300 MG capsule, Take 1 capsule (300 mg total) by mouth 3 (three) times daily., Disp: 90 capsule, Rfl: 3    HYDROcodone-acetaminophen (NORCO)  mg per tablet, Take 1 tablet by mouth., Disp: , Rfl:     imipramine (TOFRANIL) 25 MG tablet, Take 1 tablet (25 mg total) by mouth every evening., Disp: 30 tablet, Rfl: 11    LINZESS 290 mcg Cap, TK ONE C PO QD ON  "EMPTY STOMACH, Disp: , Rfl: 2    magnesium oxide (MAG-OX) 400 mg (241.3 mg magnesium) tablet, Take 1 tablet (400 mg total) by mouth once daily., Disp: 90 tablet, Rfl: 3    meloxicam (MOBIC) 15 MG tablet, Take 1 tablet (15 mg total) by mouth once daily., Disp: 30 tablet, Rfl: 2    metoclopramide HCl (REGLAN) 10 MG tablet, Take 1 tablet by mouth before meals and at bedtime as needed., Disp: , Rfl:     metoprolol succinate (TOPROL-XL) 25 MG 24 hr tablet, Take 1 tablet (25 mg total) by mouth once daily., Disp: 90 tablet, Rfl: 3    oxybutynin (DITROPAN XL) 15 MG TR24, TK 1 T PO BID, Disp: , Rfl: 0    PROAIR HFA 90 mcg/actuation inhaler, Inhale 1 puff into the lungs every 4 (four) hours as needed. , Disp: , Rfl: 0    SYMBICORT 160-4.5 mcg/actuation HFAA, Inhale 2 puffs into the lungs once daily. , Disp: , Rfl: 0    traMADoL (ULTRAM) 50 mg tablet, Take 50 mg by mouth every 6 (six) hours., Disp: , Rfl:   No current facility-administered medications for this visit.    Facility-Administered Medications Ordered in Other Visits:     0.9%  NaCl infusion, , Intravenous, Continuous, Deb Bolivar MD, Stopped at 03/18/22 1055    fentaNYL injection 25 mcg, 25 mcg, Intravenous, Q5 Min PRN, Pam Siddiqui MD    midazolam (VERSED) 1 mg/mL injection 0.5 mg, 0.5 mg, Intravenous, PRN, Pam Siddiqui MD, 2 mg at 02/12/21 1053    sodium chloride 0.9% flush 3 mL, 3 mL, Intravenous, PRN, Deb Bolivar MD    ALLERGIES:   Review of patient's allergies indicates:   Allergen Reactions    Lipitor [atorvastatin]      hair loss    Statins-hmg-coa reductase inhibitors      Hair loss        PHYSICAL EXAMINATION:  /72   Pulse 69   Ht 5' 11" (1.803 m)   Wt 102.1 kg (225 lb)   LMP  (LMP Unknown)   BMI 31.38 kg/m²   General: Well-developed well-nourished 51 y.o. femalein no acute distress   Cardiovascular: Regular rhythm by palpation of distal pulse, normal color and temperature, no concerning varicosities on " symptomatic side   Lungs: No labored breathing or wheezing appreciated   Neuro: Alert and oriented ×3   Psychiatric: well oriented to person, place and time, demonstrates normal mood and affect   Skin: No rashes, lesions or ulcers, normal temperature, turgor, and texture on involved extremity    Ortho/SPM Exam  Examination of the right knee demonstrates intact extensor mechanism.  No significant effusion.  Positive patellar crepitus and grind.  Pain over the patellar facets.  Medial greater than lateral joint line tenderness.  Stable to varus and valgus stress.  5° short of full extension, flexion to 120° with pain.    Exam of the left knee shows a well-healed incision.  Range of motion is from full extension to 125° of flexion.  No mid flexion instability.  Stable to varus and valgus stress.    IMAGING:  X-rays including standing, weight bearing AP and flexion bilateral knees, RIGHT knee lateral and sunrise views ordered and images reviewed by me show:    Right knee DJD    ASSESSMENT:      ICD-10-CM ICD-9-CM   1. Primary osteoarthritis of right knee  M17.11 715.16   2. Chronic pain of both knees  M25.561 719.46    M25.562 338.29    G89.29    3. S/P total knee arthroplasty, left  Z96.652 V43.65     PLAN:     -Findings and treatment options were discussed with the patient.  Recommend conservative treatment for the right knee.  -Proceeded with right knee CSI  -Referral to Dr. Luz Maria Tidwell to discuss her candidacy for the Iovera procedure.  The patient has chronic pain syndrome and has been on narcotics preoperatively.  -RTC as needed   -All questions answered      Large Joint Aspiration/Injection: R knee    Date/Time: 3/17/2022 10:15 AM  Performed by: ADALBERTO Hill MD  Authorized by: ADALBERTO Hill MD     Consent Done?:  Yes (Verbal)  Indications:  Pain  Site marked: the procedure site was marked    Timeout: prior to procedure the correct patient, procedure, and site was verified    Prep: patient was prepped  and draped in usual sterile fashion      Local anesthesia used?: Yes    Local anesthetic:  Co-phenylcaine spray (0.2% Naropin)  Anesthetic total (ml):  4      Details:  Needle Size:  22 G  Ultrasonic Guidance for needle placement?: No    Approach:  Lateral  Location:  Knee  Site:  R knee  Medications:  40 mg triamcinolone acetonide 40 mg/mL  Patient tolerance:  Patient tolerated the procedure well with no immediate complications

## 2022-03-17 ENCOUNTER — HOSPITAL ENCOUNTER (OUTPATIENT)
Dept: RADIOLOGY | Facility: HOSPITAL | Age: 52
Discharge: HOME | End: 2022-03-17
Attending: ORTHOPAEDIC SURGERY
Payer: MEDICARE

## 2022-03-17 ENCOUNTER — OFFICE VISIT (OUTPATIENT)
Dept: SPORTS MEDICINE | Facility: CLINIC | Age: 52
End: 2022-03-17
Payer: MEDICARE

## 2022-03-17 VITALS
BODY MASS INDEX: 31.5 KG/M2 | WEIGHT: 225 LBS | HEART RATE: 69 BPM | HEIGHT: 71 IN | SYSTOLIC BLOOD PRESSURE: 135 MMHG | DIASTOLIC BLOOD PRESSURE: 72 MMHG

## 2022-03-17 DIAGNOSIS — G89.29 CHRONIC PAIN OF BOTH KNEES: ICD-10-CM

## 2022-03-17 DIAGNOSIS — M17.11 PRIMARY OSTEOARTHRITIS OF RIGHT KNEE: Primary | ICD-10-CM

## 2022-03-17 DIAGNOSIS — Z96.652 S/P TOTAL KNEE ARTHROPLASTY, LEFT: ICD-10-CM

## 2022-03-17 DIAGNOSIS — M25.561 PAIN IN BOTH KNEES, UNSPECIFIED CHRONICITY: ICD-10-CM

## 2022-03-17 DIAGNOSIS — M25.561 CHRONIC PAIN OF BOTH KNEES: ICD-10-CM

## 2022-03-17 DIAGNOSIS — M25.562 PAIN IN BOTH KNEES, UNSPECIFIED CHRONICITY: ICD-10-CM

## 2022-03-17 DIAGNOSIS — M25.562 CHRONIC PAIN OF BOTH KNEES: ICD-10-CM

## 2022-03-17 PROCEDURE — 3075F SYST BP GE 130 - 139MM HG: CPT | Mod: CPTII,S$GLB,, | Performed by: ORTHOPAEDIC SURGERY

## 2022-03-17 PROCEDURE — 73564 X-RAY EXAM KNEE 4 OR MORE: CPT | Mod: 26,50,, | Performed by: RADIOLOGY

## 2022-03-17 PROCEDURE — 73564 X-RAY EXAM KNEE 4 OR MORE: CPT | Mod: TC,50

## 2022-03-17 PROCEDURE — 99999 PR PBB SHADOW E&M-EST. PATIENT-LVL III: ICD-10-PCS | Mod: PBBFAC,,, | Performed by: ORTHOPAEDIC SURGERY

## 2022-03-17 PROCEDURE — 20610 LARGE JOINT ASPIRATION/INJECTION: R KNEE: ICD-10-PCS | Mod: RT,S$GLB,, | Performed by: ORTHOPAEDIC SURGERY

## 2022-03-17 PROCEDURE — 99213 OFFICE O/P EST LOW 20 MIN: CPT | Mod: 25,S$GLB,, | Performed by: ORTHOPAEDIC SURGERY

## 2022-03-17 PROCEDURE — 3008F BODY MASS INDEX DOCD: CPT | Mod: CPTII,S$GLB,, | Performed by: ORTHOPAEDIC SURGERY

## 2022-03-17 PROCEDURE — 99213 PR OFFICE/OUTPT VISIT, EST, LEVL III, 20-29 MIN: ICD-10-PCS | Mod: 25,S$GLB,, | Performed by: ORTHOPAEDIC SURGERY

## 2022-03-17 PROCEDURE — 3075F PR MOST RECENT SYSTOLIC BLOOD PRESS GE 130-139MM HG: ICD-10-PCS | Mod: CPTII,S$GLB,, | Performed by: ORTHOPAEDIC SURGERY

## 2022-03-17 PROCEDURE — 3008F PR BODY MASS INDEX (BMI) DOCUMENTED: ICD-10-PCS | Mod: CPTII,S$GLB,, | Performed by: ORTHOPAEDIC SURGERY

## 2022-03-17 PROCEDURE — 20610 DRAIN/INJ JOINT/BURSA W/O US: CPT | Mod: RT,S$GLB,, | Performed by: ORTHOPAEDIC SURGERY

## 2022-03-17 PROCEDURE — 73564 XR KNEE ORTHO BILAT WITH FLEXION: ICD-10-PCS | Mod: 26,50,, | Performed by: RADIOLOGY

## 2022-03-17 PROCEDURE — 99999 PR PBB SHADOW E&M-EST. PATIENT-LVL III: CPT | Mod: PBBFAC,,, | Performed by: ORTHOPAEDIC SURGERY

## 2022-03-17 PROCEDURE — 3078F DIAST BP <80 MM HG: CPT | Mod: CPTII,S$GLB,, | Performed by: ORTHOPAEDIC SURGERY

## 2022-03-17 PROCEDURE — 3078F PR MOST RECENT DIASTOLIC BLOOD PRESSURE < 80 MM HG: ICD-10-PCS | Mod: CPTII,S$GLB,, | Performed by: ORTHOPAEDIC SURGERY

## 2022-03-17 RX ADMIN — TRIAMCINOLONE ACETONIDE 40 MG: 40 INJECTION, SUSPENSION INTRA-ARTICULAR; INTRAMUSCULAR at 10:03

## 2022-03-18 ENCOUNTER — ANESTHESIA (OUTPATIENT)
Dept: SURGERY | Facility: HOSPITAL | Age: 52
End: 2022-03-18
Payer: MEDICARE

## 2022-03-18 ENCOUNTER — ANESTHESIA EVENT (OUTPATIENT)
Dept: SURGERY | Facility: HOSPITAL | Age: 52
End: 2022-03-18
Payer: MEDICARE

## 2022-03-18 ENCOUNTER — HOSPITAL ENCOUNTER (OUTPATIENT)
Facility: HOSPITAL | Age: 52
Discharge: HOME | End: 2022-03-18
Attending: ORTHOPAEDIC SURGERY | Admitting: ORTHOPAEDIC SURGERY
Payer: MEDICARE

## 2022-03-18 ENCOUNTER — TELEPHONE (OUTPATIENT)
Dept: SPORTS MEDICINE | Facility: CLINIC | Age: 52
End: 2022-03-18
Payer: MEDICARE

## 2022-03-18 VITALS
BODY MASS INDEX: 31.5 KG/M2 | HEART RATE: 64 BPM | SYSTOLIC BLOOD PRESSURE: 132 MMHG | TEMPERATURE: 98 F | WEIGHT: 225 LBS | HEIGHT: 71 IN | OXYGEN SATURATION: 100 % | DIASTOLIC BLOOD PRESSURE: 61 MMHG | RESPIRATION RATE: 18 BRPM

## 2022-03-18 DIAGNOSIS — G89.29 CHRONIC PAIN OF LEFT KNEE: Primary | ICD-10-CM

## 2022-03-18 DIAGNOSIS — M25.562 CHRONIC PAIN OF LEFT KNEE: Primary | ICD-10-CM

## 2022-03-18 DIAGNOSIS — G56.01 CARPAL TUNNEL SYNDROME OF RIGHT WRIST: ICD-10-CM

## 2022-03-18 PROCEDURE — 25000003 PHARM REV CODE 250: Performed by: INTERNAL MEDICINE

## 2022-03-18 PROCEDURE — 37000008 HC ANESTHESIA 1ST 15 MINUTES: Performed by: ORTHOPAEDIC SURGERY

## 2022-03-18 PROCEDURE — 26235 PARTIAL REMOVAL FINGER BONE: CPT | Mod: F5,,, | Performed by: ORTHOPAEDIC SURGERY

## 2022-03-18 PROCEDURE — 37000009 HC ANESTHESIA EA ADD 15 MINS: Performed by: ORTHOPAEDIC SURGERY

## 2022-03-18 PROCEDURE — 25000003 PHARM REV CODE 250: Performed by: NURSE ANESTHETIST, CERTIFIED REGISTERED

## 2022-03-18 PROCEDURE — 64721 CARPAL TUNNEL SURGERY: CPT | Mod: 51,RT,, | Performed by: ORTHOPAEDIC SURGERY

## 2022-03-18 PROCEDURE — 71000016 HC POSTOP RECOV ADDL HR: Performed by: ORTHOPAEDIC SURGERY

## 2022-03-18 PROCEDURE — 64721 PR REVISE MEDIAN N/CARPAL TUNNEL SURG: ICD-10-PCS | Mod: 51,RT,, | Performed by: ORTHOPAEDIC SURGERY

## 2022-03-18 PROCEDURE — 36000706: Performed by: ORTHOPAEDIC SURGERY

## 2022-03-18 PROCEDURE — 26235 PR PART REMV BONE,PROX/MID PHALANX: ICD-10-PCS | Mod: F5,,, | Performed by: ORTHOPAEDIC SURGERY

## 2022-03-18 PROCEDURE — 71000015 HC POSTOP RECOV 1ST HR: Performed by: ORTHOPAEDIC SURGERY

## 2022-03-18 PROCEDURE — 36000707: Performed by: ORTHOPAEDIC SURGERY

## 2022-03-18 PROCEDURE — 26080 PR EXPLORE/TREAT INTERPHALANGEAL JT,EA: ICD-10-PCS | Mod: 51,F5,, | Performed by: ORTHOPAEDIC SURGERY

## 2022-03-18 PROCEDURE — 63600175 PHARM REV CODE 636 W HCPCS: Performed by: ORTHOPAEDIC SURGERY

## 2022-03-18 PROCEDURE — 63600175 PHARM REV CODE 636 W HCPCS: Performed by: NURSE ANESTHETIST, CERTIFIED REGISTERED

## 2022-03-18 PROCEDURE — 25000003 PHARM REV CODE 250: Performed by: ORTHOPAEDIC SURGERY

## 2022-03-18 PROCEDURE — 26080 EXPLORE/TREAT FINGER JOINT: CPT | Mod: 51,F5,, | Performed by: ORTHOPAEDIC SURGERY

## 2022-03-18 RX ORDER — PROPOFOL 10 MG/ML
VIAL (ML) INTRAVENOUS
Status: DISCONTINUED | OUTPATIENT
Start: 2022-03-18 | End: 2022-03-18

## 2022-03-18 RX ORDER — FENTANYL CITRATE 50 UG/ML
INJECTION, SOLUTION INTRAMUSCULAR; INTRAVENOUS
Status: DISCONTINUED | OUTPATIENT
Start: 2022-03-18 | End: 2022-03-18

## 2022-03-18 RX ORDER — LIDOCAINE HYDROCHLORIDE 20 MG/ML
INJECTION INTRAVENOUS
Status: DISCONTINUED | OUTPATIENT
Start: 2022-03-18 | End: 2022-03-18

## 2022-03-18 RX ORDER — ACETAMINOPHEN 325 MG/1
650 TABLET ORAL EVERY 4 HOURS PRN
Status: DISCONTINUED | OUTPATIENT
Start: 2022-03-18 | End: 2022-03-18 | Stop reason: HOSPADM

## 2022-03-18 RX ORDER — PROPOFOL 10 MG/ML
VIAL (ML) INTRAVENOUS CONTINUOUS PRN
Status: DISCONTINUED | OUTPATIENT
Start: 2022-03-18 | End: 2022-03-18

## 2022-03-18 RX ORDER — OXYCODONE HYDROCHLORIDE 5 MG/1
10 TABLET ORAL EVERY 4 HOURS PRN
Status: DISCONTINUED | OUTPATIENT
Start: 2022-03-18 | End: 2022-03-18 | Stop reason: HOSPADM

## 2022-03-18 RX ORDER — CEFAZOLIN SODIUM 2 G/50ML
2 SOLUTION INTRAVENOUS
Status: DISCONTINUED | OUTPATIENT
Start: 2022-03-18 | End: 2022-03-18 | Stop reason: HOSPADM

## 2022-03-18 RX ORDER — HYDROCODONE BITARTRATE AND ACETAMINOPHEN 5; 325 MG/1; MG/1
1 TABLET ORAL EVERY 4 HOURS PRN
Qty: 20 TABLET | Refills: 0 | Status: SHIPPED | OUTPATIENT
Start: 2022-03-18 | End: 2022-03-29

## 2022-03-18 RX ORDER — BUPIVACAINE HYDROCHLORIDE 5 MG/ML
INJECTION, SOLUTION EPIDURAL; INTRACAUDAL
Status: DISCONTINUED | OUTPATIENT
Start: 2022-03-18 | End: 2022-03-18 | Stop reason: HOSPADM

## 2022-03-18 RX ORDER — KETOROLAC TROMETHAMINE 30 MG/ML
30 INJECTION, SOLUTION INTRAMUSCULAR; INTRAVENOUS ONCE
Status: DISCONTINUED | OUTPATIENT
Start: 2022-03-18 | End: 2022-03-18 | Stop reason: HOSPADM

## 2022-03-18 RX ORDER — MIDAZOLAM HYDROCHLORIDE 1 MG/ML
INJECTION, SOLUTION INTRAMUSCULAR; INTRAVENOUS
Status: DISCONTINUED | OUTPATIENT
Start: 2022-03-18 | End: 2022-03-18

## 2022-03-18 RX ORDER — VANCOMYCIN HYDROCHLORIDE 1 G/20ML
INJECTION, POWDER, LYOPHILIZED, FOR SOLUTION INTRAVENOUS
Status: DISCONTINUED | OUTPATIENT
Start: 2022-03-18 | End: 2022-03-18 | Stop reason: HOSPADM

## 2022-03-18 RX ORDER — LIDOCAINE HYDROCHLORIDE 10 MG/ML
INJECTION, SOLUTION EPIDURAL; INFILTRATION; INTRACAUDAL; PERINEURAL
Status: DISCONTINUED | OUTPATIENT
Start: 2022-03-18 | End: 2022-03-18 | Stop reason: HOSPADM

## 2022-03-18 RX ORDER — ONDANSETRON 8 MG/1
8 TABLET, ORALLY DISINTEGRATING ORAL EVERY 8 HOURS PRN
Status: DISCONTINUED | OUTPATIENT
Start: 2022-03-18 | End: 2022-03-18 | Stop reason: HOSPADM

## 2022-03-18 RX ADMIN — GLYCOPYRROLATE 0.2 MG: 0.2 INJECTION, SOLUTION INTRAMUSCULAR; INTRAVITREAL at 10:03

## 2022-03-18 RX ADMIN — PROPOFOL 125 MCG/KG/MIN: 10 INJECTION, EMULSION INTRAVENOUS at 10:03

## 2022-03-18 RX ADMIN — CEFAZOLIN SODIUM 2 G: 2 SOLUTION INTRAVENOUS at 10:03

## 2022-03-18 RX ADMIN — FENTANYL CITRATE 50 MCG: 50 INJECTION, SOLUTION INTRAMUSCULAR; INTRAVENOUS at 10:03

## 2022-03-18 RX ADMIN — SODIUM CHLORIDE: 9 INJECTION, SOLUTION INTRAVENOUS at 07:03

## 2022-03-18 RX ADMIN — LIDOCAINE HYDROCHLORIDE 100 MG: 20 INJECTION, SOLUTION INTRAVENOUS at 10:03

## 2022-03-18 RX ADMIN — MIDAZOLAM 2 MG: 1 INJECTION INTRAMUSCULAR; INTRAVENOUS at 10:03

## 2022-03-18 RX ADMIN — PROPOFOL 50 MG: 10 INJECTION, EMULSION INTRAVENOUS at 10:03

## 2022-03-18 NOTE — OP NOTE
[place] - Surgery (Hospital)  Operative Note      Date of Procedure: 3/18/2022     Procedure: Procedure(s) (LRB):  RELEASE, CARPAL TUNNEL (Right)  EXCISION, GANGLION CYST, HAND (Right)     Surgeon(s) and Role:     * Favian Correia Jr., MD - Primary    Assisting Surgeon: None    Pre-Operative Diagnosis: Carpal tunnel syndrome of right wrist [G56.01]    Post-Operative Diagnosis: Post-Op Diagnosis Codes:     * Carpal tunnel syndrome of right wrist [G56.01]    Anesthesia: Local MAC    Operative Findings (including complications, if any):  Right carpal tunnel syndrome and thumb cyst    Description of Technical Procedures:     Preop diagnosis:  1. Right carpal tunnel syndrome.    2. Loose body with bone spur right thumb.    Postop diagnosis:  Same.    Operative procedure:  1. Right carpal tunnel release.    2. Cheilectomy with removal of loose body right thumb IP joint.    Surgeon:  Masood.    Anesthesia:  Mac.    EBL:  Minimal.    Complications:  None.    Operative procedure in detail as follows:    After operative  consent was obtained , the patient was brought to the operating room and placed supine on the operating room table.  Anesthesia by IV sedation was followed by injection of xylocaine Marcaine combination into the operative site right palm and thumb.  A tourniquet applied to the right arm the right upper extremity prepped and draped out in the normal sterile fashion.  The Esmarch used to exsanguinate the limb tourniquet inflated 225 mmHg.  The carpal tunnel was approached with a 2.5 cm curved incision thenar crease with a 15 blade.  Palmar fascia divided deep retractors placed transverse carpal ligament identified carefully divided with the Mills blade.  The median nerve protected with the Brodhead elevator and division of the ligament continued proximally distally under direct visualization.  After complete release the contents of the carpal canal were inspected and noted to be intact including the recurrent  branch.  The wound irrigated and closed with interrupted 4-0 Monocryl on subcutaneous layer Dermabond on the skin.    A 2nd incision made over the dorsal IP joint right thumb with a 15 blade slightly curved to the ulnar side.  Full-thickness skin flaps were raised hemostasis achieved with Bovie.  What had appeared to be a ganglion cyst initially was then identified as a loose body within the joint.  The joint was opened carefully on 1 side and the loose body was removed it appeared to be a piece of cartilage about 4 mm in size.  There was also a bone spur noted arising from the condyle of the proximal phalanx was removed with the rongeur performing a cheilectomy.  The rest the joint was inspected noted to be intact no other cyst or loose body was encountered the wound irrigated the extensor tendon protected.    Hemostasis achieved with Bovie skin closed with a running 5 0 nylon horizontal mattress suture on the skin.  Sterile dressings applied to both followed by light wrap and a splint the tourniquet then deflated and the patient brought to the recovery room in stable condition all sponge needle counts reported as correct no complications    Significant Surgical Tasks Conducted by the Assistant(s), if Applicable: none    Estimated Blood Loss (EBL): * No values recorded between 3/18/2022 10:24 AM and 3/18/2022 11:02 AM *           Implants: * No implants in log *    Specimens:   Specimen (24h ago, onward)            None                  Condition: Good    Disposition: PACU - hemodynamically stable.    Attestation: I was present and scrubbed for the entire procedure.    Discharge Note    OUTCOME: Patient tolerated treatment/procedure well without complication and is now ready for discharge.    DISPOSITION: Home or Self Care    FINAL DIAGNOSIS:  Carpal tunnel syndrome of right wrist    FOLLOWUP: In clinic    DISCHARGE INSTRUCTIONS:    Discharge Procedure Orders   Diet general     Call MD for:  temperature >100.4      Call MD for:  persistent nausea and vomiting     Call MD for:  severe uncontrolled pain     Remove dressing in 48 hours

## 2022-03-18 NOTE — H&P
CC:  Right carpal tunnel syndrome and mucous cyst right thumb           HPI:  Sindhu Verdin is a very pleasant 51 y.o. female with ongoing symptoms right hand for the past 6 months to year  She has been treated for carpal tunnel syndrome in the past including splinting and the cage in lieu injection with temporary improvement  Previous nerve test showed borderline right carpal tunnel syndrome  More recently she has had painful swelling of the right thumb consistent with a mucous cyst  She would like to consider surgery for the right hand              PAST MEDICAL HISTORY:        Past Medical History:   Diagnosis Date    Asthma      GERD (gastroesophageal reflux disease)      Hyperlipidemia      IBS (irritable bowel syndrome)      Primary osteoarthritis of left knee 5/5/2021    Sleep apnea      Tension headache, chronic      Vaginal delivery       x3      PAST SURGICAL HISTORY:         Past Surgical History:   Procedure Laterality Date    BUNIONECTOMY Bilateral      CHOLECYSTECTOMY        CHONDROPLASTY OF KNEE Left 2/12/2021     Procedure: CHONDROPLASTY, KNEE;  Surgeon: ADALBERTO Hill MD;  Location: [facility];  Service: Orthopedics;  Laterality: Left;    COLONOSCOPY N/A 8/17/2018     Procedure: COLONOSCOPY;  Surgeon: Deb Bolivar MD;  Location: [facility];  Service: Endoscopy;  Laterality: N/A;    HYSTERECTOMY        KNEE ARTHROSCOPY W/ MENISCECTOMY Left 2/12/2021     Procedure: ARTHROSCOPY, KNEE, WITH MENISCECTOMY PARTIAL AND LATERAL;  Surgeon: ADALBERTO Hill MD;  Location: [facility] OR;  Service: Orthopedics;  Laterality: Left;  regional with catheter- adductor  pericapsular injection: 30cc ropivacaine/epi/clonidine/ketorolac injection    SUBCHONDROPLASTY Left 2/12/2021     Procedure: SUBCHONDROPLASTY, TIBIA, FEMUR;  Surgeon: ADALBERTO Hill MD;  Location: [facility];  Service: Orthopedics;  Laterality: Left;    SYNOVECTOMY OF KNEE Left 2/12/2021     Procedure: SYNOVECTOMY, KNEE;   Surgeon: ADALBERTO Hill MD;  Location: [facility] OR;  Service: Orthopedics;  Laterality: Left;    TOTAL KNEE ARTHROPLASTY Left 2021     Procedure: ARTHROPLASTY, KNEE, TOTAL;  Surgeon: ADALBERTO Hill MD;  Location: [facility] OR;  Service: Orthopedics;  Laterality: Left;  outpatient with 23hr observation  regional with catheter- spinal & adductor      FAMILY HISTORY:         Family History   Problem Relation Age of Onset    Heart attack Father           at 70     Lymphoma Mother      Cataracts Mother      Heart disease Sister      No Known Problems Brother      No Known Problems Maternal Aunt      No Known Problems Maternal Uncle      No Known Problems Paternal Aunt      No Known Problems Paternal Uncle      No Known Problems Maternal Grandmother      No Known Problems Maternal Grandfather      No Known Problems Paternal Grandmother      No Known Problems Paternal Grandfather      Breast cancer Neg Hx      Colon cancer Neg Hx      Ovarian cancer Neg Hx      Amblyopia Neg Hx      Blindness Neg Hx      Cancer Neg Hx      Diabetes Neg Hx      Glaucoma Neg Hx      Hypertension Neg Hx      Macular degeneration Neg Hx      Retinal detachment Neg Hx      Strabismus Neg Hx      Stroke Neg Hx      Thyroid disease Neg Hx        SOCIAL HISTORY:   Social History               Socioeconomic History    Marital status:    Tobacco Use    Smoking status: Former Smoker       Years: 15.00       Types: Cigarettes       Quit date:        Years since quittin.1    Smokeless tobacco: Never Used    Tobacco comment: quit when she was 33 yrs old.   Substance and Sexual Activity    Alcohol use: No       Comment: rarely    Drug use: No    Sexual activity: Yes       Partners: Male       Birth control/protection: Surgical            MEDICATIONS:   Current Outpatient Medications:     aspirin (ECOTRIN) 81 MG EC tablet, Take 1 tablet (81 mg total) by mouth 2 (two) times daily., Disp: 56 tablet, Rfl:  0    celecoxib (CELEBREX) 200 MG capsule, TAKE 1 CAPSULE(200 MG) BY MOUTH EVERY DAY, Disp: 30 capsule, Rfl: 1    cholecalciferol, vitamin D3, (VITAMIN D3) 50 mcg (2,000 unit) Cap, TK 1 C PO ONCE D, Disp: , Rfl:     diazePAM (VALIUM) 5 MG tablet, Take 1 tablet (5 mg total) by mouth every evening. for 20 days, Disp: 20 tablet, Rfl: 0    EPINEPHrine (EPIPEN) 0.3 mg/0.3 mL AtIn, Inject into the muscle., Disp: , Rfl:     ezetimibe (ZETIA) 10 mg tablet, TK 1 T PO D, Disp: , Rfl:     gabapentin (NEURONTIN) 300 MG capsule, Take 1 capsule (300 mg total) by mouth 3 (three) times daily., Disp: 90 capsule, Rfl: 3    HYDROcodone-acetaminophen (NORCO)  mg per tablet, Take 1 tablet by mouth., Disp: , Rfl:     imipramine (TOFRANIL) 25 MG tablet, Take 1 tablet (25 mg total) by mouth every evening., Disp: 30 tablet, Rfl: 11    LINZESS 290 mcg Cap, TK ONE C PO QD ON EMPTY STOMACH, Disp: , Rfl: 2    magnesium oxide (MAG-OX) 400 mg (241.3 mg magnesium) tablet, Take 1 tablet (400 mg total) by mouth once daily., Disp: 90 tablet, Rfl: 3    meloxicam (MOBIC) 15 MG tablet, Take 1 tablet (15 mg total) by mouth once daily., Disp: 30 tablet, Rfl: 2    methylPREDNISolone (MEDROL DOSEPACK) 4 mg tablet, use as directed, Disp: 1 each, Rfl: 0    metoclopramide HCl (REGLAN) 10 MG tablet, Take 1 tablet by mouth before meals and at bedtime as needed., Disp: , Rfl:     metoprolol succinate (TOPROL-XL) 25 MG 24 hr tablet, Take 1 tablet (25 mg total) by mouth once daily., Disp: 90 tablet, Rfl: 3    naproxen (NAPROSYN) 500 MG tablet, Take 1 tablet (500 mg total) by mouth 2 (two) times daily., Disp: 30 tablet, Rfl: 1    oxybutynin (DITROPAN XL) 15 MG TR24, TK 1 T PO BID, Disp: , Rfl: 0    PROAIR HFA 90 mcg/actuation inhaler, Inhale 1 puff into the lungs every 4 (four) hours as needed. , Disp: , Rfl: 0    SYMBICORT 160-4.5 mcg/actuation HFAA, Inhale 2 puffs into the lungs once daily. , Disp: , Rfl: 0    traMADoL (ULTRAM) 50 mg  "tablet, Take 50 mg by mouth every 6 (six) hours., Disp: , Rfl:   No current facility-administered medications for this visit.     Facility-Administered Medications Ordered in Other Visits:     0.9%  NaCl infusion, , Intravenous, Continuous, Deb Bolivar MD, Last Rate: 20 mL/hr at 08/17/18 0904, New Bag at 08/17/18 0904    fentaNYL injection 25 mcg, 25 mcg, Intravenous, Q5 Min PRN, Pam Siddiqui MD    midazolam (VERSED) 1 mg/mL injection 0.5 mg, 0.5 mg, Intravenous, PRN, Pam Siddiqui MD, 2 mg at 02/12/21 1053    sodium chloride 0.9% flush 3 mL, 3 mL, Intravenous, PRN, Deb Bolivar MD  ALLERGIES:         Review of patient's allergies indicates:   Allergen Reactions    Lipitor [atorvastatin]         hair loss    Statins-hmg-coa reductase inhibitors         Hair loss         Review of Systems:  Constitutional: no fever or chills  ENT: no nasal congestion or sore throat  Respiratory: no cough or shortness of breath  Cardiovascular: no chest pain or palpitations  Gastrointestinal: no nausea or vomiting, PUD, GERD, NSAID intolerance  Genitourinary: no hematuria or dysuria  Integument/Breast: no rash or pruritis  Hematologic/Lymphatic: no easy bruising or lymphadenopathy  Musculoskeletal: see HPI  Neurological: no seizures or tremors  Behavioral/Psych: no auditory or visual hallucinations        Physical Exam       Vitals:     02/08/22 1052   Weight: 99.3 kg (219 lb)   Height: 5' 11" (1.803 m)   PainSc:   8         Constitutional: Oriented to person, place, and time. Appears well-developed and well-nourished.   HENT:   Head: Normocephalic and atraumatic.   Nose: Nose normal.   Eyes: No scleral icterus.   Neck: Normal range of motion.   Cardiovascular: Normal rate and regular rhythm.    Pulses:       Radial pulses are 2+ on the right side, and 2+ on the left side.   Pulmonary/Chest: Effort normal and breath sounds normal.   Abdominal: Soft.   Neurological: Alert and oriented to person, place, and " "time.   Skin: Skin is warm.   Psychiatric: Normal mood and affect.      MUSCULOSKELETAL UPPER EXTREMITY:  Examination right hand demonstrate mild swelling positive Tinel sign  Negative Phalen's test  Range of motion wrist fingers full  There is also tenderness over the dorsum of the right thumb IP joint consistent with a small mucous cyst  Tender to touch  No open wound no drainage  No nail bed deformity                 Diagnostic Studies:  AP lateral x-ray of the right thumb demonstrate a small loose body over the dorsal aspect of the IP joint  Previous nerve conduction studies show evidence of borderline right carpal tunnel syndrome        Assessment:  1. Right carpal tunnel syndrome.     2. Mucous cyst right thumb symptomatic      Plan:  The patient has failed non operative treatment and even though her nerve test is borderline.  I think her symptoms do warrant surgical treatment at this point and  she would like to proceed with right carpal tunnel release as an outpatient surgery  We can also remove the cyst from her right thumb with excisional biopsy  The risks and benefits of surgery explained to the patient she understands           The risks and benefits of surgery were discussed with the patient today and they understand.  The consent was signed in the office for surgery.        Favian Correia MD (Jay)  [facility]  Orthopedic Upper Extremity Surgery       "

## 2022-03-18 NOTE — ANESTHESIA PREPROCEDURE EVALUATION
03/18/2022      Sindhu Verdin is a 51 y.o. female     Patient Active Problem List   Diagnosis    Intractable migraine without aura    Anxiety state    LBP (low back pain)    Meralgia paraesthetica    IBS (irritable bowel syndrome)    Hyperlipidemia    GERD (gastroesophageal reflux disease)    Asthma    Chest pain, atypical    AMARA (obstructive sleep apnea)    CAP (community acquired pneumonia)    Sleep apnea    Upper airway resistance syndrome    Palpitations    Screen for colon cancer    Refractive error    Sleep-related movement disorder    Old tear of lateral meniscus of left knee    Decreased range of motion of left knee    Poor balance    Primary osteoarthritis of left knee    Weakness    Localized edema    Carpal tunnel syndrome of right wrist       Review of patient's allergies indicates:   Allergen Reactions    Lipitor [atorvastatin]      hair loss    Statins-hmg-coa reductase inhibitors      Hair loss       Current Facility-Administered Medications on File Prior to Visit   Medication Dose Route Frequency Provider Last Rate Last Admin    0.9%  NaCl infusion   Intravenous Continuous Deb Bolivar MD 20 mL/hr at 08/17/18 0904 New Bag at 08/17/18 0904    cefazolin (ANCEF) 2 gram in dextrose 5% 50 mL IVPB (premix)  2 g Intravenous On Call Procedure Favian Correia Jr., MD        fentaNYL injection 25 mcg  25 mcg Intravenous Q5 Min PRN Pam Siddiqui MD        midazolam (VERSED) 1 mg/mL injection 0.5 mg  0.5 mg Intravenous PRN Pam Siddiqui MD   2 mg at 02/12/21 1053    sodium chloride 0.9% flush 3 mL  3 mL Intravenous PRN eDb Bolivar MD         Current Outpatient Medications on File Prior to Visit   Medication Sig Dispense Refill    aspirin (ECOTRIN) 81 MG EC tablet Take 1 tablet (81 mg total) by mouth 2 (two) times daily. 56 tablet 0     celecoxib (CELEBREX) 200 MG capsule TAKE 1 CAPSULE(200 MG) BY MOUTH EVERY DAY 30 capsule 1    cholecalciferol, vitamin D3, (VITAMIN D3) 50 mcg (2,000 unit) Cap TK 1 C PO ONCE D      diazePAM (VALIUM) 5 MG tablet Take 1 tablet (5 mg total) by mouth every evening. for 20 days 20 tablet 0    EPINEPHrine (EPIPEN) 0.3 mg/0.3 mL AtIn Inject into the muscle.      ezetimibe (ZETIA) 10 mg tablet TK 1 T PO D      gabapentin (NEURONTIN) 300 MG capsule Take 1 capsule (300 mg total) by mouth 3 (three) times daily. 90 capsule 3    HYDROcodone-acetaminophen (NORCO)  mg per tablet Take 1 tablet by mouth.      imipramine (TOFRANIL) 25 MG tablet Take 1 tablet (25 mg total) by mouth every evening. 30 tablet 11    LINZESS 290 mcg Cap TK ONE C PO QD ON EMPTY STOMACH  2    magnesium oxide (MAG-OX) 400 mg (241.3 mg magnesium) tablet Take 1 tablet (400 mg total) by mouth once daily. 90 tablet 3    meloxicam (MOBIC) 15 MG tablet Take 1 tablet (15 mg total) by mouth once daily. 30 tablet 2    methylPREDNISolone (MEDROL DOSEPACK) 4 mg tablet use as directed 1 each 0    metoclopramide HCl (REGLAN) 10 MG tablet Take 1 tablet by mouth before meals and at bedtime as needed.      metoprolol succinate (TOPROL-XL) 25 MG 24 hr tablet Take 1 tablet (25 mg total) by mouth once daily. 90 tablet 3    naproxen (NAPROSYN) 500 MG tablet Take 1 tablet (500 mg total) by mouth 2 (two) times daily. 30 tablet 1    oxybutynin (DITROPAN XL) 15 MG TR24 TK 1 T PO BID  0    PROAIR HFA 90 mcg/actuation inhaler Inhale 1 puff into the lungs every 4 (four) hours as needed.   0    SYMBICORT 160-4.5 mcg/actuation HFAA Inhale 2 puffs into the lungs once daily.   0    traMADoL (ULTRAM) 50 mg tablet Take 50 mg by mouth every 6 (six) hours.         Past Surgical History:   Procedure Laterality Date    BUNIONECTOMY Bilateral     CHOLECYSTECTOMY      CHONDROPLASTY OF KNEE Left 2/12/2021    Procedure: CHONDROPLASTY, KNEE;  Surgeon: ADALBERTO Hill MD;   Location: [facility] OR;  Service: Orthopedics;  Laterality: Left;    COLONOSCOPY N/A 2018    Procedure: COLONOSCOPY;  Surgeon: Deb Bolivar MD;  Location: [facility] ENDO;  Service: Endoscopy;  Laterality: N/A;    HYSTERECTOMY      KNEE ARTHROSCOPY W/ MENISCECTOMY Left 2021    Procedure: ARTHROSCOPY, KNEE, WITH MENISCECTOMY PARTIAL AND LATERAL;  Surgeon: ADALBERTO Hill MD;  Location: [facility] OR;  Service: Orthopedics;  Laterality: Left;  regional with catheter- adductor  pericapsular injection: 30cc ropivacaine/epi/clonidine/ketorolac injection    SUBCHONDROPLASTY Left 2021    Procedure: SUBCHONDROPLASTY, TIBIA, FEMUR;  Surgeon: ADALBERTO Hill MD;  Location: [facility] OR;  Service: Orthopedics;  Laterality: Left;    SYNOVECTOMY OF KNEE Left 2021    Procedure: SYNOVECTOMY, KNEE;  Surgeon: ADALBERTO Hill MD;  Location: [facility] OR;  Service: Orthopedics;  Laterality: Left;    TOTAL KNEE ARTHROPLASTY Left 2021    Procedure: ARTHROPLASTY, KNEE, TOTAL;  Surgeon: ADALBERTO Hill MD;  Location: [facility] OR;  Service: Orthopedics;  Laterality: Left;  outpatient with 23hr observation  regional with catheter- spinal & adductor       Social History     Socioeconomic History    Marital status:    Tobacco Use    Smoking status: Former Smoker     Years: 15.00     Types: Cigarettes     Quit date:      Years since quittin.2    Smokeless tobacco: Never Used    Tobacco comment: quit when she was 33 yrs old.   Substance and Sexual Activity    Alcohol use: No     Comment: rarely    Drug use: No    Sexual activity: Yes     Partners: Male     Birth control/protection: Surgical     EKG:  Normal sinus rhythm   Normal ECG   When compared with ECG of 14-AUG-2020 11:03,   Premature ventricular complexes are no longer Present   Confirmed by Benigno MACKAY, Curtis ([id number]) on 2021 8:23:32 PM     2D Echo:  Results for orders placed or performed during the hospital encounter of 18   2D Echo w/  Color Flow Doppler   Result Value Ref Range    EF + QEF 55 55 - 65    Diastolic Dysfunction No          Pre-op Assessment    I have reviewed the Patient Summary Reports.    I have reviewed the Nursing Notes. I have reviewed the NPO Status.   I have reviewed the Medications.     Review of Systems  Anesthesia Hx:  Denies Family Hx of Anesthesia complications.   Denies Personal Hx of Anesthesia complications.   Cardiovascular:   Exercise tolerance: good Denies CABG/stent.  Denies Dysrhythmias.   Denies Angina.        Pulmonary:   Asthma Sleep Apnea, CPAP    Renal/:   Denies Chronic Renal Disease.     Hepatic/GI:   GERD    Musculoskeletal:   Arthritis     Neurological:   Denies CVA. Denies Seizures.    Endocrine:   Denies Diabetes.        Physical Exam  General:  Well nourished      Airway/Jaw/Neck:  Airway Findings: Mouth Opening: Normal   Tongue: Normal   General Airway Assessment: Adult, Average Mallampati: III  Improves to II with phonation.  TM Distance: Normal, at least 6 cm         Eyes/Ears/Nose:  EYES/EARS/NOSE FINDINGS: Normal   Dental:  Dental Findings: In tact, Molar caps     Chest/Lungs:  Chest/Lungs Findings: Clear to auscultation, Normal Respiratory Rate      Heart/Vascular:  Heart Findings: Rate: Normal  Rhythm: Regular Rhythm  Sounds: Normal        Mental Status:  Mental Status Findings:  Cooperative, Alert and Oriented         Anesthesia Plan  Type of Anesthesia, risks & benefits discussed:  Anesthesia Type:  general    Patient's Preference:   Plan Factors:          Intra-op Monitoring Plan: standard ASA monitors  Intra-op Monitoring Plan Comments:   Post Op Pain Control Plan: multimodal analgesia and per primary service following discharge from PACU  Post Op Pain Control Plan Comments:     Induction:   IV  Beta Blocker:  Patient is on a Beta-Blocker and has received one dose within the past 24 hours (No further documentation required).       Informed Consent: Informed consent signed with the Patient  and all parties understand the risks and agree with anesthesia plan.  All questions answered.  Anesthesia consent signed with patient.  ASA Score: 2     Day of Surgery Review of History & Physical:              Ready For Surgery From Anesthesia Perspective.           Physical Exam  General: Well nourished    Airway:  Mallampati: III / II  Mouth Opening: Normal  TM Distance: Normal, at least 6 cm  Tongue: Normal    Dental:  In tact, Molar caps    Chest/Lungs:  Clear to auscultation, Normal Respiratory Rate    Heart:  Rate: Normal  Rhythm: Regular Rhythm  Sounds: Normal          Anesthesia Plan  Type of Anesthesia, risks & benefits discussed:    Anesthesia Type: general  Intra-op Monitoring Plan: standard ASA monitors  Post Op Pain Control Plan: multimodal analgesia and per primary service following discharge from PACU  Induction:  IV  Informed Consent: Informed consent signed with the Patient and all parties understand the risks and agree with anesthesia plan.  All questions answered.   ASA Score: 2    Ready For Surgery From Anesthesia Perspective.       .

## 2022-03-18 NOTE — TRANSFER OF CARE
"Anesthesia Transfer of Care Note    Patient: Sindhu Verdin    Procedure(s) Performed: Procedure(s) (LRB):  RELEASE, CARPAL TUNNEL (Right)  EXCISION, GANGLION CYST, HAND (Right)    Patient location: [facility]    Anesthesia Type: MAC    Transport from OR: Transported from OR on room air with adequate spontaneous ventilation    Post pain: adequate analgesia    Post assessment: no apparent anesthetic complications and tolerated procedure well    Post vital signs: stable    Level of consciousness: awake and alert    Nausea/Vomiting: no nausea/vomiting    Complications: none    Transfer of care protocol was followed      Last vitals:   Visit Vitals  /60 (BP Location: Right arm, Patient Position: Lying)   Pulse 63   Temp 36.4 °C (97.5 °F) (Skin)   Resp 20   Ht 5' 11" (1.803 m)   Wt 102.1 kg (225 lb)   LMP  (LMP Unknown)   SpO2 97%   Breastfeeding No   BMI 31.38 kg/m²     "

## 2022-03-18 NOTE — PLAN OF CARE
Pt and spouse given AVS and educated on discharge instructions.  Verbalized understanding of teaching.  No questions or concerns at this time.  VSS. Denies pain and n/v.  Tolerating clear liquids.  Paper rx given to pt.  IV removed.  Discharge to home

## 2022-03-18 NOTE — TELEPHONE ENCOUNTER
----- Message from Jenifer Brice sent at 3/18/2022  8:37 AM CDT -----  Order is in.    ----- Message -----  From: Luis Alberto Yang MA  Sent: 3/17/2022  12:00 PM CDT  To: Luz Maria Tidwell MD, Yaw Robles    [facility],     Dr. Hill wanted this patient to see Dr. Tidwell for an Iovera procedure on her left knee. Her insurance is peoples [employer] insurance. We wanted to see if this procedure would be covered under her insurance.     Thank you!  Luis Alberto Yang   [facility] Resident

## 2022-03-18 NOTE — TELEPHONE ENCOUNTER
Called and spoke to patient to get her Iovera procedure scheduled with Dr. Tidwell. Patient states she had surgery this morning and would like a call back on Monday.

## 2022-03-18 NOTE — ANESTHESIA POSTPROCEDURE EVALUATION
Anesthesia Post Evaluation    Patient: Sindhu Verdin    Procedure(s) Performed: Procedure(s) (LRB):  RELEASE, CARPAL TUNNEL (Right)  EXCISION, GANGLION CYST, HAND (Right)    Final Anesthesia Type: MAC      Patient location during evaluation: [facility]  Patient participation: Yes- Able to Participate  Level of consciousness: awake and alert and awake  Post-procedure vital signs: reviewed and stable  Pain management: adequate  Airway patency: patent    PONV status at discharge: No PONV  Anesthetic complications: no      Cardiovascular status: blood pressure returned to baseline, hemodynamically stable and stable  Respiratory status: unassisted, spontaneous ventilation and room air  Hydration status: euvolemic  Follow-up not needed.          Vitals Value Taken Time   BP  03/18/22 1108   Temp  03/18/22 1108   Pulse  03/18/22 1108   Resp  03/18/22 1108   SpO2  03/18/22 1108       See nursing notes for vitals   No case tracking events are documented in the log.      Pain/Felicita Score: No data recorded

## 2022-03-21 ENCOUNTER — PATIENT MESSAGE (OUTPATIENT)
Dept: ORTHOPEDICS | Facility: CLINIC | Age: 52
End: 2022-03-21
Payer: MEDICARE

## 2022-03-22 ENCOUNTER — TELEPHONE (OUTPATIENT)
Dept: SPORTS MEDICINE | Facility: CLINIC | Age: 52
End: 2022-03-22
Payer: MEDICARE

## 2022-03-22 NOTE — TELEPHONE ENCOUNTER
Called and left voicemail for patient to contact the office to schedule the Iovera procedure that Dr. Hill referred to Dr. Tidwell for.

## 2022-03-25 NOTE — PROGRESS NOTES
Subjective:      Patient ID: Sindhu Verdin is a 51 y.o. female.    Chief Complaint: No chief complaint on file.      HPI: (French)    Sindhu Verdin is here for a postop visit. She is s/p RIGHT carpal tunnel release and cheilectomy with removal of loose body right thumb IP joint by Dr. Correia on 3/18/22.     Preop numbness and tingling is gone. She has minimal pain in the thumb. She has been working on moving the thumb/hand. She is on chronic pain medication, she is not taking any pain medication for her hand.       Past Medical History:   Diagnosis Date    Asthma     GERD (gastroesophageal reflux disease)     Hyperlipidemia     IBS (irritable bowel syndrome)     Primary osteoarthritis of left knee 5/5/2021    Sleep apnea     Tension headache, chronic     Vaginal delivery     x3       Current Outpatient Medications:     celecoxib (CELEBREX) 200 MG capsule, TAKE 1 CAPSULE(200 MG) BY MOUTH EVERY DAY, Disp: 30 capsule, Rfl: 1    cholecalciferol, vitamin D3, (VITAMIN D3) 50 mcg (2,000 unit) Cap, TK 1 C PO ONCE D, Disp: , Rfl:     EPINEPHrine (EPIPEN) 0.3 mg/0.3 mL AtIn, Inject into the muscle., Disp: , Rfl:     ezetimibe (ZETIA) 10 mg tablet, TK 1 T PO D, Disp: , Rfl:     gabapentin (NEURONTIN) 300 MG capsule, Take 1 capsule (300 mg total) by mouth 3 (three) times daily., Disp: 90 capsule, Rfl: 3    HYDROcodone-acetaminophen (NORCO)  mg per tablet, Take 1 tablet by mouth., Disp: , Rfl:     LINZESS 290 mcg Cap, TK ONE C PO QD ON EMPTY STOMACH, Disp: , Rfl: 2    magnesium oxide (MAG-OX) 400 mg (241.3 mg magnesium) tablet, Take 1 tablet (400 mg total) by mouth once daily., Disp: 90 tablet, Rfl: 3    meloxicam (MOBIC) 15 MG tablet, Take 1 tablet (15 mg total) by mouth once daily., Disp: 30 tablet, Rfl: 2    metoclopramide HCl (REGLAN) 10 MG tablet, Take 1 tablet by mouth before meals and at bedtime as needed., Disp: , Rfl:     metoprolol succinate (TOPROL-XL) 25 MG 24 hr  "tablet, Take 1 tablet (25 mg total) by mouth once daily., Disp: 90 tablet, Rfl: 3    oxybutynin (DITROPAN XL) 15 MG TR24, TK 1 T PO BID, Disp: , Rfl: 0    PROAIR HFA 90 mcg/actuation inhaler, Inhale 1 puff into the lungs every 4 (four) hours as needed. , Disp: , Rfl: 0    SYMBICORT 160-4.5 mcg/actuation HFAA, Inhale 2 puffs into the lungs once daily. , Disp: , Rfl: 0    traMADoL (ULTRAM) 50 mg tablet, Take 50 mg by mouth every 6 (six) hours., Disp: , Rfl:     aspirin (ECOTRIN) 81 MG EC tablet, Take 1 tablet (81 mg total) by mouth 2 (two) times daily., Disp: 56 tablet, Rfl: 0    diazePAM (VALIUM) 5 MG tablet, Take 1 tablet (5 mg total) by mouth every evening. for 20 days, Disp: 20 tablet, Rfl: 0    imipramine (TOFRANIL) 25 MG tablet, Take 1 tablet (25 mg total) by mouth every evening., Disp: 30 tablet, Rfl: 11  No current facility-administered medications for this visit.    Facility-Administered Medications Ordered in Other Visits:     0.9%  NaCl infusion, , Intravenous, Continuous, Deb Bolivar MD, Stopped at 03/18/22 1055    fentaNYL injection 25 mcg, 25 mcg, Intravenous, Q5 Min PRN, Pam Siddiqui MD    midazolam (VERSED) 1 mg/mL injection 0.5 mg, 0.5 mg, Intravenous, PRN, Pam Siddiqui MD, 2 mg at 02/12/21 1053    sodium chloride 0.9% flush 3 mL, 3 mL, Intravenous, PRN, Deb Bolivar MD  Review of patient's allergies indicates:   Allergen Reactions    Lipitor [atorvastatin]      hair loss    Statins-hmg-coa reductase inhibitors      Hair loss       Ht 5' 11" (1.803 m)   Wt 98.4 kg (217 lb)   LMP  (LMP Unknown)   BMI 30.27 kg/m²     Review of Systems   Constitutional: Negative for chills, fever, night sweats and weight gain.   Gastrointestinal: Negative for bowel incontinence, nausea and vomiting.   Genitourinary: Negative for bladder incontinence.   Neurological: Negative for disturbances in coordination and loss of balance.         Objective:    Ortho Exam     On exam of right " hand:      Significant for carpal tunnel incision. Wound margins are well approximated and healing nicely.   Incision at right thumb is clean and dry with sutures intact.   No redness, warmth, drainage, or other signs of infection.     Good ROM wrist and fingers     Limited flexion of thumb with minimal pain  She is NVI distally with good capillary refill.         Assessment:     Imaging:  None        1. Carpal tunnel syndrome of right wrist    2. Pain of right thumb    3. S/P carpal tunnel release          Plan:         She is doing well s/p above surgery. Treatment options reviewed with patient and following plan made:     - Regular wound care explained with soap and water.  - Wean out of brace and start using hand for light activities.   - Avoid pressure to the palm of the hand.  - Sutures removed from thumb incision.   - No lifting with hand for 2 more weeks.   - Follow up with Dr. Correia in 4 weeks. Staff will call her with this appointment.      Follow up in about 4 weeks (around 4/26/2022) for postop visit with Dr. Correia.

## 2022-03-29 ENCOUNTER — OFFICE VISIT (OUTPATIENT)
Dept: ORTHOPEDICS | Facility: CLINIC | Age: 52
End: 2022-03-29
Payer: MEDICARE

## 2022-03-29 ENCOUNTER — TELEPHONE (OUTPATIENT)
Dept: ORTHOPEDICS | Facility: CLINIC | Age: 52
End: 2022-03-29
Payer: MEDICARE

## 2022-03-29 VITALS — WEIGHT: 217 LBS | HEIGHT: 71 IN | BODY MASS INDEX: 30.38 KG/M2

## 2022-03-29 DIAGNOSIS — M79.644 PAIN OF RIGHT THUMB: ICD-10-CM

## 2022-03-29 DIAGNOSIS — G56.01 CARPAL TUNNEL SYNDROME OF RIGHT WRIST: Primary | ICD-10-CM

## 2022-03-29 DIAGNOSIS — Z98.890 S/P CARPAL TUNNEL RELEASE: ICD-10-CM

## 2022-03-29 PROCEDURE — 3008F BODY MASS INDEX DOCD: CPT | Mod: CPTII,S$GLB,, | Performed by: PHYSICIAN ASSISTANT

## 2022-03-29 PROCEDURE — 99999 PR PBB SHADOW E&M-EST. PATIENT-LVL III: CPT | Mod: PBBFAC,,, | Performed by: PHYSICIAN ASSISTANT

## 2022-03-29 PROCEDURE — 1159F PR MEDICATION LIST DOCUMENTED IN MEDICAL RECORD: ICD-10-PCS | Mod: CPTII,S$GLB,, | Performed by: PHYSICIAN ASSISTANT

## 2022-03-29 PROCEDURE — 1160F PR REVIEW ALL MEDS BY PRESCRIBER/CLIN PHARMACIST DOCUMENTED: ICD-10-PCS | Mod: CPTII,S$GLB,, | Performed by: PHYSICIAN ASSISTANT

## 2022-03-29 PROCEDURE — 99024 PR POST-OP FOLLOW-UP VISIT: ICD-10-PCS | Mod: S$GLB,,, | Performed by: PHYSICIAN ASSISTANT

## 2022-03-29 PROCEDURE — 3008F PR BODY MASS INDEX (BMI) DOCUMENTED: ICD-10-PCS | Mod: CPTII,S$GLB,, | Performed by: PHYSICIAN ASSISTANT

## 2022-03-29 PROCEDURE — 99024 POSTOP FOLLOW-UP VISIT: CPT | Mod: S$GLB,,, | Performed by: PHYSICIAN ASSISTANT

## 2022-03-29 PROCEDURE — 1159F MED LIST DOCD IN RCRD: CPT | Mod: CPTII,S$GLB,, | Performed by: PHYSICIAN ASSISTANT

## 2022-03-29 PROCEDURE — 1160F RVW MEDS BY RX/DR IN RCRD: CPT | Mod: CPTII,S$GLB,, | Performed by: PHYSICIAN ASSISTANT

## 2022-03-29 PROCEDURE — 99999 PR PBB SHADOW E&M-EST. PATIENT-LVL III: ICD-10-PCS | Mod: PBBFAC,,, | Performed by: PHYSICIAN ASSISTANT

## 2022-03-29 NOTE — TELEPHONE ENCOUNTER
----- Message from Faviola Logan PA-C sent at 3/29/2022 11:09 AM CDT -----  Please schedule her a 4 week postop with Masood and call her.  She is s/p RIGHT carpal tunnel release and cheilectomy with removal of loose body right thumb IP joint by him on 3/18/22.     Thank you!

## 2022-03-31 ENCOUNTER — PATIENT MESSAGE (OUTPATIENT)
Dept: ORTHOPEDICS | Facility: CLINIC | Age: 52
End: 2022-03-31
Payer: MEDICARE

## 2022-04-10 RX ORDER — TRIAMCINOLONE ACETONIDE 40 MG/ML
40 INJECTION, SUSPENSION INTRA-ARTICULAR; INTRAMUSCULAR
Status: DISCONTINUED | OUTPATIENT
Start: 2022-03-17 | End: 2022-04-11 | Stop reason: HOSPADM

## 2022-06-16 ENCOUNTER — PATIENT MESSAGE (OUTPATIENT)
Dept: ADMINISTRATIVE | Facility: OTHER | Age: 52
End: 2022-06-16
Payer: MEDICARE

## 2022-07-19 ENCOUNTER — TELEPHONE (OUTPATIENT)
Dept: ORTHOPEDICS | Facility: CLINIC | Age: 52
End: 2022-07-19
Payer: MEDICARE

## 2022-07-19 NOTE — TELEPHONE ENCOUNTER
Called and spoke with the pt to reschedule the pt's appointment.     ----- Message from Isela Craven sent at 7/19/2022  1:14 PM CDT -----  Type: Requesting to speak with nurse         Who Called: PT  Regarding: Pt trying to get in with her  tomorrow for [address] Yousuf Verdin please advise. She is already scheduled for next week but trying to make one trp pleas advise   Would the patient rather a call back or a response via [facility]? Call back  Best Call Back Number: [phone]  Additional Information: n/a

## 2022-08-09 ENCOUNTER — LAB VISIT (OUTPATIENT)
Dept: LAB | Facility: HOSPITAL | Age: 52
End: 2022-08-09
Attending: ORTHOPAEDIC SURGERY
Payer: MEDICARE

## 2022-08-09 ENCOUNTER — OFFICE VISIT (OUTPATIENT)
Dept: ORTHOPEDICS | Facility: CLINIC | Age: 52
End: 2022-08-09
Payer: MEDICARE

## 2022-08-09 VITALS
DIASTOLIC BLOOD PRESSURE: 84 MMHG | WEIGHT: 228.38 LBS | BODY MASS INDEX: 31.97 KG/M2 | HEART RATE: 85 BPM | HEIGHT: 71 IN | SYSTOLIC BLOOD PRESSURE: 136 MMHG

## 2022-08-09 DIAGNOSIS — Z96.652 PAIN DUE TO TOTAL LEFT KNEE REPLACEMENT, INITIAL ENCOUNTER: Primary | ICD-10-CM

## 2022-08-09 DIAGNOSIS — Z96.7 PRESENCE OF OTHER BONE AND TENDON IMPLANTS: ICD-10-CM

## 2022-08-09 DIAGNOSIS — T84.84XA PAIN DUE TO TOTAL LEFT KNEE REPLACEMENT, INITIAL ENCOUNTER: ICD-10-CM

## 2022-08-09 DIAGNOSIS — T84.84XA PAIN DUE TO TOTAL LEFT KNEE REPLACEMENT, INITIAL ENCOUNTER: Primary | ICD-10-CM

## 2022-08-09 DIAGNOSIS — Z96.652 PAIN DUE TO TOTAL LEFT KNEE REPLACEMENT, INITIAL ENCOUNTER: ICD-10-CM

## 2022-08-09 DIAGNOSIS — Z96.652 PRESENCE OF LEFT ARTIFICIAL KNEE JOINT: ICD-10-CM

## 2022-08-09 LAB
CRP SERPL-MCNC: 3.1 MG/L (ref 0–8.2)
ERYTHROCYTE [SEDIMENTATION RATE] IN BLOOD BY WESTERGREN METHOD: 28 MM/HR (ref 0–20)

## 2022-08-09 PROCEDURE — 3008F BODY MASS INDEX DOCD: CPT | Mod: CPTII,S$GLB,, | Performed by: ORTHOPAEDIC SURGERY

## 2022-08-09 PROCEDURE — 99214 PR OFFICE/OUTPT VISIT, EST, LEVL IV, 30-39 MIN: ICD-10-PCS | Mod: S$GLB,,, | Performed by: ORTHOPAEDIC SURGERY

## 2022-08-09 PROCEDURE — 3079F DIAST BP 80-89 MM HG: CPT | Mod: CPTII,S$GLB,, | Performed by: ORTHOPAEDIC SURGERY

## 2022-08-09 PROCEDURE — 99999 PR PBB SHADOW E&M-EST. PATIENT-LVL IV: ICD-10-PCS | Mod: PBBFAC,,, | Performed by: ORTHOPAEDIC SURGERY

## 2022-08-09 PROCEDURE — 99214 OFFICE O/P EST MOD 30 MIN: CPT | Mod: S$GLB,,, | Performed by: ORTHOPAEDIC SURGERY

## 2022-08-09 PROCEDURE — 1159F MED LIST DOCD IN RCRD: CPT | Mod: CPTII,S$GLB,, | Performed by: ORTHOPAEDIC SURGERY

## 2022-08-09 PROCEDURE — 1159F PR MEDICATION LIST DOCUMENTED IN MEDICAL RECORD: ICD-10-PCS | Mod: CPTII,S$GLB,, | Performed by: ORTHOPAEDIC SURGERY

## 2022-08-09 PROCEDURE — 3075F PR MOST RECENT SYSTOLIC BLOOD PRESS GE 130-139MM HG: ICD-10-PCS | Mod: CPTII,S$GLB,, | Performed by: ORTHOPAEDIC SURGERY

## 2022-08-09 PROCEDURE — 36415 COLL VENOUS BLD VENIPUNCTURE: CPT | Performed by: ORTHOPAEDIC SURGERY

## 2022-08-09 PROCEDURE — 3079F PR MOST RECENT DIASTOLIC BLOOD PRESSURE 80-89 MM HG: ICD-10-PCS | Mod: CPTII,S$GLB,, | Performed by: ORTHOPAEDIC SURGERY

## 2022-08-09 PROCEDURE — 86140 C-REACTIVE PROTEIN: CPT | Performed by: ORTHOPAEDIC SURGERY

## 2022-08-09 PROCEDURE — 3075F SYST BP GE 130 - 139MM HG: CPT | Mod: CPTII,S$GLB,, | Performed by: ORTHOPAEDIC SURGERY

## 2022-08-09 PROCEDURE — 85652 RBC SED RATE AUTOMATED: CPT | Performed by: ORTHOPAEDIC SURGERY

## 2022-08-09 PROCEDURE — 99999 PR PBB SHADOW E&M-EST. PATIENT-LVL IV: CPT | Mod: PBBFAC,,, | Performed by: ORTHOPAEDIC SURGERY

## 2022-08-09 PROCEDURE — 3008F PR BODY MASS INDEX (BMI) DOCUMENTED: ICD-10-PCS | Mod: CPTII,S$GLB,, | Performed by: ORTHOPAEDIC SURGERY

## 2022-08-09 NOTE — PROGRESS NOTES
Subjective:      Patient ID: Sindhu Verdin is a 52 y.o. female.    Chief Complaint: Pain of the Left Knee    This is a 52-year-old female who underwent left total knee replacement in May of 2021. She appears she had a fairly uneventful postoperative course.  She does not report any return to the OR or long-term antibiotic use.  She did approximately 3 months of physical therapy.  She states that her pain had not improved after the surgery.  She has the same if not more pain than what she had prior to her knee replacement.  No fevers chills or systemic complaints.  No feelings of instability.  Pain is localized to knee nonradiating.  She complains of swelling.  Pain is mostly anterior based.  No start-up pain.  Pain is continuous but mostly activity related.  Minimal relief with NSAIDs and Tylenol.      Social History     Occupational History    Not on file   Tobacco Use    Smoking status: Former Smoker     Years: 15.00     Types: Cigarettes     Quit date:      Years since quittin.6    Smokeless tobacco: Never Used    Tobacco comment: quit when she was 33 yrs old.   Substance and Sexual Activity    Alcohol use: No     Comment: rarely    Drug use: No    Sexual activity: Yes     Partners: Male     Birth control/protection: Surgical      Review of Systems   Constitutional: Negative for diaphoresis.   HENT: Negative for ear discharge, nosebleeds and stridor.    Eyes: Negative for photophobia.   Cardiovascular: Negative for syncope.   Respiratory: Negative for hemoptysis, shortness of breath and wheezing.    Neurological: Negative for tremors.   Psychiatric/Behavioral: Negative.          Objective:    General    Constitutional: She is oriented to person, place, and time. She appears well-developed and well-nourished.   HENT:   Head: Normocephalic and atraumatic.   Eyes: EOM are normal.   Pulmonary/Chest: Effort normal.   Neurological: She is alert and oriented to person, place, and time.    Psychiatric: She has a normal mood and affect. Her behavior is normal. Judgment and thought content normal.     General Musculoskeletal Exam   Gait: normal         Left Knee Exam     Inspection   Erythema: absent  Scars: present  Swelling: absent  Effusion: present  Deformity: absent  Bruising: absent    Tenderness   The patient tender to palpation of the medial joint line and lateral joint line.    Range of Motion   Extension: 0   Flexion: 0 (105)     Tests   Stability PCL-Posterior Drawer: normal (0 to 2mm)  MCL - Valgus: normal (0 to 2mm)  LCL - Varus: normal (0 to 2mm)  Patella   Passive Patellar Tilt: neutral  Patellar Tracking: normal    Other   Sensation: normal    Comments:  Incision well healed    Muscle Strength   Left Lower Extremity   Quadriceps:  5/5          Assessment:       1. Pain due to total left knee replacement, initial encounter    2. Presence of left artificial knee joint    3. Presence of other bone and tendon implants          Plan:       X-rays today show well done total knee replacement.  No obvious issues with alignment on the AP and lateral.  On the sunrise view there appears to be some lateral tilt of the patella potentially due to internal rotation of the femoral condyle.  However she does not have any evidence of patellar instability clinically.  Her exam is relatively unremarkable with some element of a positive seated straight leg raise but not that significant.  We will go ahead and order screening inflammatory labs today.  We will also order a 3 phase bone scan to better evaluate for any potential early loosening.  Given the position of the patella on the sunrise view we will also obtain a CT to evaluate femoral component rotation.

## 2022-08-18 ENCOUNTER — TELEPHONE (OUTPATIENT)
Dept: ORTHOPEDICS | Facility: CLINIC | Age: 52
End: 2022-08-18
Payer: MEDICARE

## 2022-08-18 DIAGNOSIS — M79.644 PAIN OF RIGHT THUMB: Primary | ICD-10-CM

## 2022-08-31 ENCOUNTER — TELEPHONE (OUTPATIENT)
Dept: CARDIOLOGY | Facility: CLINIC | Age: 52
End: 2022-08-31
Payer: MEDICARE

## 2022-08-31 NOTE — TELEPHONE ENCOUNTER
----- Message from Tammy Ulloa sent at 8/31/2022  2:24 PM CDT -----  Type: Patient Call Back    Who called: self     What is the request in detail: Patient states she received a letter from her insurance company stating she's being treating by Dr. Casey for high blood pressure. Patient states she does not have this and would like this corrected in her file. Thank you.     Can the clinic reply by [facility]? No     Would the patient rather a call back or a response via [facility]? Call back     Best call back number: [phone]

## 2022-08-31 NOTE — TELEPHONE ENCOUNTER
I called the pt to try and clarify if she was concerned about her bill or if it was about her medical record, but I was unable to leave a voice mail. I did leave a sms notification that informed her that the [facility] line called her.

## 2022-09-01 ENCOUNTER — OFFICE VISIT (OUTPATIENT)
Dept: ORTHOPEDICS | Facility: CLINIC | Age: 52
End: 2022-09-01
Payer: MEDICARE

## 2022-09-01 VITALS
SYSTOLIC BLOOD PRESSURE: 113 MMHG | HEART RATE: 84 BPM | HEIGHT: 71 IN | BODY MASS INDEX: 31.32 KG/M2 | DIASTOLIC BLOOD PRESSURE: 72 MMHG | WEIGHT: 223.75 LBS

## 2022-09-01 DIAGNOSIS — T84.058A PERIPROSTHETIC OSTEOLYSIS OF INTERNAL PROSTHETIC KNEE JOINT: Primary | ICD-10-CM

## 2022-09-01 DIAGNOSIS — Z96.659 PERIPROSTHETIC OSTEOLYSIS OF INTERNAL PROSTHETIC KNEE JOINT: Primary | ICD-10-CM

## 2022-09-01 PROCEDURE — 3078F DIAST BP <80 MM HG: CPT | Mod: CPTII,S$GLB,, | Performed by: ORTHOPAEDIC SURGERY

## 2022-09-01 PROCEDURE — 99999 PR PBB SHADOW E&M-EST. PATIENT-LVL III: ICD-10-PCS | Mod: PBBFAC,,, | Performed by: ORTHOPAEDIC SURGERY

## 2022-09-01 PROCEDURE — 3008F PR BODY MASS INDEX (BMI) DOCUMENTED: ICD-10-PCS | Mod: CPTII,S$GLB,, | Performed by: ORTHOPAEDIC SURGERY

## 2022-09-01 PROCEDURE — 1159F PR MEDICATION LIST DOCUMENTED IN MEDICAL RECORD: ICD-10-PCS | Mod: CPTII,S$GLB,, | Performed by: ORTHOPAEDIC SURGERY

## 2022-09-01 PROCEDURE — 1159F MED LIST DOCD IN RCRD: CPT | Mod: CPTII,S$GLB,, | Performed by: ORTHOPAEDIC SURGERY

## 2022-09-01 PROCEDURE — 3074F SYST BP LT 130 MM HG: CPT | Mod: CPTII,S$GLB,, | Performed by: ORTHOPAEDIC SURGERY

## 2022-09-01 PROCEDURE — 99214 PR OFFICE/OUTPT VISIT, EST, LEVL IV, 30-39 MIN: ICD-10-PCS | Mod: S$GLB,,, | Performed by: ORTHOPAEDIC SURGERY

## 2022-09-01 PROCEDURE — 99214 OFFICE O/P EST MOD 30 MIN: CPT | Mod: S$GLB,,, | Performed by: ORTHOPAEDIC SURGERY

## 2022-09-01 PROCEDURE — 3008F BODY MASS INDEX DOCD: CPT | Mod: CPTII,S$GLB,, | Performed by: ORTHOPAEDIC SURGERY

## 2022-09-01 PROCEDURE — 3078F PR MOST RECENT DIASTOLIC BLOOD PRESSURE < 80 MM HG: ICD-10-PCS | Mod: CPTII,S$GLB,, | Performed by: ORTHOPAEDIC SURGERY

## 2022-09-01 PROCEDURE — 3074F PR MOST RECENT SYSTOLIC BLOOD PRESSURE < 130 MM HG: ICD-10-PCS | Mod: CPTII,S$GLB,, | Performed by: ORTHOPAEDIC SURGERY

## 2022-09-01 PROCEDURE — 99999 PR PBB SHADOW E&M-EST. PATIENT-LVL III: CPT | Mod: PBBFAC,,, | Performed by: ORTHOPAEDIC SURGERY

## 2022-09-01 NOTE — PROGRESS NOTES
Subjective:      Patient ID: Sindhu Verdin is a 52 y.o. female.    Chief Complaint: Pain and Results of the Right Knee    Patient presents today for follow-up.  Inflammatory labs showed no evidence of acute infection.  CT scan shows femoral component has been internally rotated by about 3°.  Three-phase bone scan shows increased uptake throughout the total knee replacement.  Patient complains of continued pain in her knee.    Social History     Occupational History    Not on file   Tobacco Use    Smoking status: Former     Years: 15.00     Types: Cigarettes     Quit date:      Years since quittin.6    Smokeless tobacco: Never    Tobacco comments:     quit when she was 33 yrs old.   Substance and Sexual Activity    Alcohol use: No     Comment: rarely    Drug use: No    Sexual activity: Yes     Partners: Male     Birth control/protection: Surgical      ROS      Objective:    Ortho/SPM Exam       Assessment:       1. 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  This appears aseptic in nature.  At this point we have 2 options.  She can consider revision total knee replacement or symptomatic management for the time being.  Radiographically there is no gross subsidence or sense of urgency to revise this.  We discussed peripheral nerve stimulation and the potential to buy more time with long-term regional pain management.  She would like more information on this.  I will have the representative from the company contact her to give her more details.

## 2022-09-02 ENCOUNTER — PATIENT MESSAGE (OUTPATIENT)
Dept: ORTHOPEDICS | Facility: CLINIC | Age: 52
End: 2022-09-02
Payer: MEDICARE

## 2022-09-02 DIAGNOSIS — T84.058A PERIPROSTHETIC OSTEOLYSIS OF INTERNAL PROSTHETIC KNEE JOINT: Primary | ICD-10-CM

## 2022-09-02 DIAGNOSIS — T84.84XA PAIN DUE TO TOTAL LEFT KNEE REPLACEMENT, INITIAL ENCOUNTER: ICD-10-CM

## 2022-09-02 DIAGNOSIS — Z96.652 PAIN DUE TO TOTAL LEFT KNEE REPLACEMENT, INITIAL ENCOUNTER: ICD-10-CM

## 2022-09-02 DIAGNOSIS — Z96.659 PERIPROSTHETIC OSTEOLYSIS OF INTERNAL PROSTHETIC KNEE JOINT: Primary | ICD-10-CM

## 2022-09-15 ENCOUNTER — PATIENT MESSAGE (OUTPATIENT)
Dept: ADMINISTRATIVE | Facility: OTHER | Age: 52
End: 2022-09-15
Payer: MEDICARE

## 2022-09-19 ENCOUNTER — OFFICE VISIT (OUTPATIENT)
Dept: ORTHOPEDICS | Facility: CLINIC | Age: 52
End: 2022-09-19
Payer: MEDICARE

## 2022-09-19 ENCOUNTER — PATIENT MESSAGE (OUTPATIENT)
Dept: ORTHOPEDICS | Facility: CLINIC | Age: 52
End: 2022-09-19

## 2022-09-19 VITALS — BODY MASS INDEX: 31.3 KG/M2 | WEIGHT: 224.38 LBS

## 2022-09-19 DIAGNOSIS — Z41.9 ELECTIVE SURGERY: ICD-10-CM

## 2022-09-19 DIAGNOSIS — M79.644 PAIN OF RIGHT THUMB: Primary | ICD-10-CM

## 2022-09-19 PROCEDURE — 3008F PR BODY MASS INDEX (BMI) DOCUMENTED: ICD-10-PCS | Mod: CPTII,S$GLB,, | Performed by: PHYSICIAN ASSISTANT

## 2022-09-19 PROCEDURE — 99213 OFFICE O/P EST LOW 20 MIN: CPT | Mod: S$GLB,,, | Performed by: PHYSICIAN ASSISTANT

## 2022-09-19 PROCEDURE — 3008F BODY MASS INDEX DOCD: CPT | Mod: CPTII,S$GLB,, | Performed by: PHYSICIAN ASSISTANT

## 2022-09-19 PROCEDURE — 1160F PR REVIEW ALL MEDS BY PRESCRIBER/CLIN PHARMACIST DOCUMENTED: ICD-10-PCS | Mod: CPTII,S$GLB,, | Performed by: PHYSICIAN ASSISTANT

## 2022-09-19 PROCEDURE — 1159F MED LIST DOCD IN RCRD: CPT | Mod: CPTII,S$GLB,, | Performed by: PHYSICIAN ASSISTANT

## 2022-09-19 PROCEDURE — 1159F PR MEDICATION LIST DOCUMENTED IN MEDICAL RECORD: ICD-10-PCS | Mod: CPTII,S$GLB,, | Performed by: PHYSICIAN ASSISTANT

## 2022-09-19 PROCEDURE — 99999 PR PBB SHADOW E&M-EST. PATIENT-LVL IV: CPT | Mod: PBBFAC,,, | Performed by: PHYSICIAN ASSISTANT

## 2022-09-19 PROCEDURE — 99213 PR OFFICE/OUTPT VISIT, EST, LEVL III, 20-29 MIN: ICD-10-PCS | Mod: S$GLB,,, | Performed by: PHYSICIAN ASSISTANT

## 2022-09-19 PROCEDURE — 1160F RVW MEDS BY RX/DR IN RCRD: CPT | Mod: CPTII,S$GLB,, | Performed by: PHYSICIAN ASSISTANT

## 2022-09-19 PROCEDURE — 99999 PR PBB SHADOW E&M-EST. PATIENT-LVL IV: ICD-10-PCS | Mod: PBBFAC,,, | Performed by: PHYSICIAN ASSISTANT

## 2022-09-19 RX ORDER — DICLOFENAC SODIUM 10 MG/G
2 GEL TOPICAL 4 TIMES DAILY
Qty: 3 EACH | Refills: 2 | Status: SHIPPED | OUTPATIENT
Start: 2022-09-19

## 2022-09-19 NOTE — PROGRESS NOTES
Subjective:      Patient ID: Sindhu Verdin is a 52 y.o. female.    Chief Complaint: Pain of the Right Hand      HPI: (French)    She is s/p RIGHT carpal tunnel release and cheilectomy with removal of loose body right thumb IP joint by Dr. Correia on 3/18/22.     She was last seen by me on 3/29/22 and she was doing well. She was lost to follow up.     4-5 month history of constant pain and swelling in right thumb. No numbness or tingling. Pain is worse with using her right hand. She is right hand dominant. She rates her pain as an 8 on a scale of 1-10. She thinks her thumb is more swollen that it was prior to surgery.     She is taking prn hydrocodone/ultram from PCP.       Past Medical History:   Diagnosis Date    Asthma     GERD (gastroesophageal reflux disease)     Hyperlipidemia     IBS (irritable bowel syndrome)     Primary osteoarthritis of left knee 5/5/2021    Sleep apnea     Tension headache, chronic     Vaginal delivery     x3       Current Outpatient Medications:     cholecalciferol, vitamin D3, (VITAMIN D3) 50 mcg (2,000 unit) Cap, TK 1 C PO ONCE D, Disp: , Rfl:     diazePAM (VALIUM) 5 MG tablet, Take 1 tablet (5 mg total) by mouth every evening. for 20 days, Disp: 20 tablet, Rfl: 0    EPINEPHrine (EPIPEN) 0.3 mg/0.3 mL AtIn, Inject into the muscle., Disp: , Rfl:     ezetimibe (ZETIA) 10 mg tablet, TK 1 T PO D, Disp: , Rfl:     gabapentin (NEURONTIN) 300 MG capsule, Take 1 capsule (300 mg total) by mouth 3 (three) times daily., Disp: 90 capsule, Rfl: 3    HYDROcodone-acetaminophen (NORCO)  mg per tablet, Take 1 tablet by mouth., Disp: , Rfl:     LINZESS 290 mcg Cap, TK ONE C PO QD ON EMPTY STOMACH, Disp: , Rfl: 2    magnesium oxide (MAG-OX) 400 mg (241.3 mg magnesium) tablet, Take 1 tablet (400 mg total) by mouth once daily., Disp: 90 tablet, Rfl: 3    metoclopramide HCl (REGLAN) 10 MG tablet, Take 1 tablet by mouth before meals and at bedtime as needed., Disp: , Rfl:     metoprolol  succinate (TOPROL-XL) 25 MG 24 hr tablet, Take 1 tablet (25 mg total) by mouth once daily., Disp: 90 tablet, Rfl: 3    oxybutynin (DITROPAN XL) 15 MG TR24, TK 1 T PO BID, Disp: , Rfl: 0    PROAIR HFA 90 mcg/actuation inhaler, Inhale 1 puff into the lungs every 4 (four) hours as needed. , Disp: , Rfl: 0    SYMBICORT 160-4.5 mcg/actuation HFAA, Inhale 2 puffs into the lungs once daily. , Disp: , Rfl: 0    traMADoL (ULTRAM) 50 mg tablet, Take 50 mg by mouth every 6 (six) hours., Disp: , Rfl:     aspirin (ECOTRIN) 81 MG EC tablet, Take 1 tablet (81 mg total) by mouth 2 (two) times daily. (Patient not taking: Reported on 9/19/2022), Disp: 56 tablet, Rfl: 0    diclofenac sodium (VOLTAREN) 1 % Gel, Apply 2 g topically 4 (four) times daily., Disp: 3 each, Rfl: 2    imipramine (TOFRANIL) 25 MG tablet, Take 1 tablet (25 mg total) by mouth every evening., Disp: 30 tablet, Rfl: 11  No current facility-administered medications for this visit.    Facility-Administered Medications Ordered in Other Visits:     0.9%  NaCl infusion, , Intravenous, Continuous, Deb Bolivar MD, Stopped at 03/18/22 1055    fentaNYL injection 25 mcg, 25 mcg, Intravenous, Q5 Min PRN, Pam Siddiqui MD    midazolam (VERSED) 1 mg/mL injection 0.5 mg, 0.5 mg, Intravenous, PRN, Pam Siddiqui MD, 2 mg at 02/12/21 1053    sodium chloride 0.9% flush 3 mL, 3 mL, Intravenous, PRN, Deb Bolivar MD  Review of patient's allergies indicates:   Allergen Reactions    Lipitor [atorvastatin]      hair loss    Statins-hmg-coa reductase inhibitors      Hair loss       Wt 101.8 kg (224 lb 6.4 oz)   LMP  (LMP Unknown)   BMI 31.30 kg/m²     Review of Systems   Constitutional: Negative for chills, fever, night sweats and weight gain.   Gastrointestinal:  Negative for bowel incontinence, nausea and vomiting.   Genitourinary:  Negative for bladder incontinence.   Neurological:  Negative for disturbances in coordination and loss of balance.       Objective:     Ortho Exam     RIGHT Hand/Wrist Examination:    Observation/Inspection:  Swelling  none    Deformity  none  Discoloration  none     Scars   Carpal tunnel and right thumb incision that are healed.    Atrophy  none    HAND/WRIST EXAMINATION:  Finkelstein's Test   Neg  WHAT Test    Neg  Snuff box tenderness   Neg  Hook of Hamate Tenderness  Neg  CMC grind    Neg    Neurovascular Exam:  Digits WWP, brisk CR < 3s throughout  NVI motor/LTS to M/R/U nerves, radial pulse 2+  Tinel's Test - Carpal Tunnel  Neg  Tinel's Test - Cubital Tunnel  Neg  Phalen's Test    Neg  Median Nerve Compression Test Neg    ROM hand/wrist/elbow full, painless    She has swelling at IP joint of right thumb with tenderness. Area does not feel fluctuant.                   XRAY INTERPRETATION:   X-rays of right hand dated 8/19/22 are personally reviewed and show no fracture or dislocation        Assessment:     Imaging:  None        1. Pain of right thumb    2. Elective surgery            Plan:         Orders Placed This Encounter    diclofenac sodium (VOLTAREN) 1 % Gel       She is s/p RIGHT carpal tunnel release and cheilectomy with removal of loose body right thumb IP joint by Dr. Correia on 3/18/22.     4-5 month history of constant pain and swelling in right thumb. No numbness or tingling. Pain is worse with using her right hand. She is right hand dominant.     XRs of right hand look okay.     Treatment options reviewed with patient along with above right hand xrays. Following plan made:     - Unable to tolerate po NSAIDs (hair falls out). New prescription for voltaren gel. Reviewed side effects and dosing instructions.   - Will review with Dr. Correia and email her with further plan. May consider OT?    Follow up if symptoms worsen or fail to improve.        ADDENDUM 9/19/22:   Patient reviewed with Dr. Correia. He is unsure of exact etiology of thumb swelling/pain. He recommends OT and for patient to follow up with him in 6 weeks. Will  message patient and let her know.

## 2023-01-04 ENCOUNTER — OFFICE VISIT (OUTPATIENT)
Dept: CARDIOLOGY | Facility: CLINIC | Age: 53
End: 2023-01-04
Payer: MEDICARE

## 2023-01-04 VITALS
SYSTOLIC BLOOD PRESSURE: 143 MMHG | WEIGHT: 232.81 LBS | RESPIRATION RATE: 16 BRPM | BODY MASS INDEX: 32.47 KG/M2 | DIASTOLIC BLOOD PRESSURE: 67 MMHG | OXYGEN SATURATION: 99 % | HEART RATE: 66 BPM

## 2023-01-04 DIAGNOSIS — R07.89 CHEST PAIN, ATYPICAL: ICD-10-CM

## 2023-01-04 DIAGNOSIS — R00.2 PALPITATIONS: Primary | ICD-10-CM

## 2023-01-04 DIAGNOSIS — E78.00 PURE HYPERCHOLESTEROLEMIA: ICD-10-CM

## 2023-01-04 PROCEDURE — 3008F PR BODY MASS INDEX (BMI) DOCUMENTED: ICD-10-PCS | Mod: CPTII,S$GLB,, | Performed by: INTERNAL MEDICINE

## 2023-01-04 PROCEDURE — 3078F DIAST BP <80 MM HG: CPT | Mod: CPTII,S$GLB,, | Performed by: INTERNAL MEDICINE

## 2023-01-04 PROCEDURE — 1159F PR MEDICATION LIST DOCUMENTED IN MEDICAL RECORD: ICD-10-PCS | Mod: CPTII,S$GLB,, | Performed by: INTERNAL MEDICINE

## 2023-01-04 PROCEDURE — 1159F MED LIST DOCD IN RCRD: CPT | Mod: CPTII,S$GLB,, | Performed by: INTERNAL MEDICINE

## 2023-01-04 PROCEDURE — 99214 OFFICE O/P EST MOD 30 MIN: CPT | Mod: S$GLB,,, | Performed by: INTERNAL MEDICINE

## 2023-01-04 PROCEDURE — 93000 ELECTROCARDIOGRAM COMPLETE: CPT | Mod: S$GLB,,, | Performed by: INTERNAL MEDICINE

## 2023-01-04 PROCEDURE — 3077F PR MOST RECENT SYSTOLIC BLOOD PRESSURE >= 140 MM HG: ICD-10-PCS | Mod: CPTII,S$GLB,, | Performed by: INTERNAL MEDICINE

## 2023-01-04 PROCEDURE — 99214 PR OFFICE/OUTPT VISIT, EST, LEVL IV, 30-39 MIN: ICD-10-PCS | Mod: S$GLB,,, | Performed by: INTERNAL MEDICINE

## 2023-01-04 PROCEDURE — 3008F BODY MASS INDEX DOCD: CPT | Mod: CPTII,S$GLB,, | Performed by: INTERNAL MEDICINE

## 2023-01-04 PROCEDURE — 3078F PR MOST RECENT DIASTOLIC BLOOD PRESSURE < 80 MM HG: ICD-10-PCS | Mod: CPTII,S$GLB,, | Performed by: INTERNAL MEDICINE

## 2023-01-04 PROCEDURE — 99999 PR PBB SHADOW E&M-EST. PATIENT-LVL IV: CPT | Mod: PBBFAC,,, | Performed by: INTERNAL MEDICINE

## 2023-01-04 PROCEDURE — 99999 PR PBB SHADOW E&M-EST. PATIENT-LVL IV: ICD-10-PCS | Mod: PBBFAC,,, | Performed by: INTERNAL MEDICINE

## 2023-01-04 PROCEDURE — 93000 EKG 12-LEAD: ICD-10-PCS | Mod: S$GLB,,, | Performed by: INTERNAL MEDICINE

## 2023-01-04 PROCEDURE — 3077F SYST BP >= 140 MM HG: CPT | Mod: CPTII,S$GLB,, | Performed by: INTERNAL MEDICINE

## 2023-01-04 NOTE — PROGRESS NOTES
Subjective:    Patient ID:  Sindhu Verdin is a 52 y.o. female who presents for follow-up of No chief complaint on file.      HPI    CP, asthma, palpitations, HLD, AMARA     4/11/17 LHC - EDP 9, EF 60%, normal coronaries     Stress echo 5/20/20  Normal left ventricular systolic function. The estimated ejection fraction is 55%.  Normal LV diastolic function.  Normal right ventricular systolic function.  Mild tricuspid regurgitation.  The estimated PA systolic pressure is 32 mmHg.  The ECG portion of this study is negative for myocardial ischemia.  The stress echo portion of this study is negative for myocardial ischemia.     Echo 1/17/18    1 - Normal left ventricular systolic function (EF 55-60%).     2 - Normal right ventricular systolic function .     3 - Normal left ventricular diastolic function.     4 - No wall motion abnormalities.     5.  Normal valvular function     Stress test 4/3/17  LVEF: 67 %  Impression: ABNORMAL MYOCARDIAL PERFUSION  1. There is evidence for moderate myocardial ischemia in the anterior wall of the left ventricle.   2. The perfusion scan is free of evidence for myocardial injury.   3. Resting wall motion is physiologic.   4. Resting LV function is normal.      Holter 1/17/18 - average HR 76, Few PVCs and PACS, no SVT or VT     8/22/18 Still gets occasional palpitations  Denies CP or SOB  EKG NSR - ok     9/5/19 Denies CP or SOB  EKG NSR - ok  Having some pains in her left lower back with numbness down left leg  Echo from 2016 showed mild AS - she has no cardiac murmur and no AS was seen on echo 1/17/18 - doubt she actually has AS  Cardiac stable  OV 1 year     Went to the ER 3/10/20  49-year-old complains of intermittent pain to her anterior chest for the last 2 days.  Patient was seen here 3 days ago secondary to a fall.  Patient injured her suprapubic area after she fell on to a trailer hitch.  At that ER visit patient had no chest pain or shortness of breath.  It was the  next day when her pain began.  She does feel occasional short of breath and feels like she cannot take a deep breath in.  She denies fever.  She does have an occasional cough.  She still notes swelling to her suprapubic area and has been taking hydrocodone for the pain.  Her appetite has been normal.  No fever.     EKG 3/10/20 NSR PVCs NSSTT changes     5/13/20 Still with exertional chest tightness and SOB   Stress echo for CP and SOB     5/27/20 CP improved - only with heavy lifting     8/14/20 Stable COLEMAN and atypical CP  Occasional palpitation  EKG NSR PVC - NSSTT changes - no change  BP well controlled     1/5/21 Still with atypical CP - thinks it's GERD  Denies recent palpitations  EKG NSR - ok    Continue Rx for atypical CP, palpitations, HLD  OV 6 months     7/8/21 Denies recent CP or SOB. Had TKR in May - still not exercising much  EKG 5/7/21 NSR - ok    Continue Rx for atypical CP, palpitations, HLD  OV 6 months     1/4/22 Denies CP, mild COLEMAN. BP controlled  EKG NSR - ok     Continue Rx for atypical CP, palpitations, HLD  OV 1 year      1/4/23 Denies CP or SOB  Suffering with URI  EKG NSR PRWP  BP elevated - controlled by outside readings       Review of Systems   Constitutional: Negative for decreased appetite.   HENT:  Negative for ear discharge.    Eyes:  Negative for blurred vision.   Respiratory:  Negative for hemoptysis.    Endocrine: Negative for polyphagia.   Hematologic/Lymphatic: Negative for adenopathy.   Skin:  Negative for color change.   Musculoskeletal:  Negative for joint swelling.   Genitourinary:  Negative for bladder incontinence.   Neurological:  Negative for brief paralysis.   Psychiatric/Behavioral:  Negative for hallucinations.    Allergic/Immunologic: Negative for hives.      Objective:    Physical Exam  Constitutional:       Appearance: She is well-developed.   HENT:      Head: Normocephalic and atraumatic.   Eyes:      Conjunctiva/sclera: Conjunctivae normal.      Pupils: Pupils are  equal, round, and reactive to light.   Cardiovascular:      Rate and Rhythm: Normal rate.      Pulses: Intact distal pulses.      Heart sounds: Normal heart sounds.   Pulmonary:      Effort: Pulmonary effort is normal.      Breath sounds: Normal breath sounds.   Abdominal:      General: Bowel sounds are normal.      Palpations: Abdomen is soft.   Musculoskeletal:         General: Normal range of motion.      Cervical back: Normal range of motion and neck supple.   Skin:     General: Skin is warm and dry.   Neurological:      Mental Status: She is alert and oriented to person, place, and time.         Assessment:       1. Palpitations    2. Pure hypercholesterolemia    3. Chest pain, atypical         Plan:          Continue Rx for atypical CP, palpitations, HLD  OV 1 year

## 2023-01-12 ENCOUNTER — OFFICE VISIT (OUTPATIENT)
Dept: PULMONOLOGY | Facility: CLINIC | Age: 53
End: 2023-01-12
Payer: MEDICARE

## 2023-01-12 ENCOUNTER — PATIENT MESSAGE (OUTPATIENT)
Dept: PULMONOLOGY | Facility: CLINIC | Age: 53
End: 2023-01-12

## 2023-01-12 VITALS
DIASTOLIC BLOOD PRESSURE: 76 MMHG | HEIGHT: 71 IN | WEIGHT: 225.5 LBS | BODY MASS INDEX: 31.57 KG/M2 | HEART RATE: 75 BPM | OXYGEN SATURATION: 98 % | SYSTOLIC BLOOD PRESSURE: 130 MMHG

## 2023-01-12 DIAGNOSIS — R29.818 SUSPECTED SLEEP APNEA: ICD-10-CM

## 2023-01-12 DIAGNOSIS — K21.9 GASTROESOPHAGEAL REFLUX DISEASE, UNSPECIFIED WHETHER ESOPHAGITIS PRESENT: ICD-10-CM

## 2023-01-12 DIAGNOSIS — J45.20 MILD INTERMITTENT ASTHMA WITHOUT COMPLICATION: Primary | ICD-10-CM

## 2023-01-12 PROCEDURE — 3075F PR MOST RECENT SYSTOLIC BLOOD PRESS GE 130-139MM HG: ICD-10-PCS | Mod: CPTII,S$GLB,, | Performed by: INTERNAL MEDICINE

## 2023-01-12 PROCEDURE — 3075F SYST BP GE 130 - 139MM HG: CPT | Mod: CPTII,S$GLB,, | Performed by: INTERNAL MEDICINE

## 2023-01-12 PROCEDURE — 3008F BODY MASS INDEX DOCD: CPT | Mod: CPTII,S$GLB,, | Performed by: INTERNAL MEDICINE

## 2023-01-12 PROCEDURE — 3078F PR MOST RECENT DIASTOLIC BLOOD PRESSURE < 80 MM HG: ICD-10-PCS | Mod: CPTII,S$GLB,, | Performed by: INTERNAL MEDICINE

## 2023-01-12 PROCEDURE — 99999 PR PBB SHADOW E&M-EST. PATIENT-LVL IV: CPT | Mod: PBBFAC,,, | Performed by: INTERNAL MEDICINE

## 2023-01-12 PROCEDURE — 3078F DIAST BP <80 MM HG: CPT | Mod: CPTII,S$GLB,, | Performed by: INTERNAL MEDICINE

## 2023-01-12 PROCEDURE — 3008F PR BODY MASS INDEX (BMI) DOCUMENTED: ICD-10-PCS | Mod: CPTII,S$GLB,, | Performed by: INTERNAL MEDICINE

## 2023-01-12 PROCEDURE — 1160F RVW MEDS BY RX/DR IN RCRD: CPT | Mod: CPTII,S$GLB,, | Performed by: INTERNAL MEDICINE

## 2023-01-12 PROCEDURE — 99999 PR PBB SHADOW E&M-EST. PATIENT-LVL IV: ICD-10-PCS | Mod: PBBFAC,,, | Performed by: INTERNAL MEDICINE

## 2023-01-12 PROCEDURE — 1159F MED LIST DOCD IN RCRD: CPT | Mod: CPTII,S$GLB,, | Performed by: INTERNAL MEDICINE

## 2023-01-12 PROCEDURE — 1159F PR MEDICATION LIST DOCUMENTED IN MEDICAL RECORD: ICD-10-PCS | Mod: CPTII,S$GLB,, | Performed by: INTERNAL MEDICINE

## 2023-01-12 PROCEDURE — 1160F PR REVIEW ALL MEDS BY PRESCRIBER/CLIN PHARMACIST DOCUMENTED: ICD-10-PCS | Mod: CPTII,S$GLB,, | Performed by: INTERNAL MEDICINE

## 2023-01-12 PROCEDURE — 99213 PR OFFICE/OUTPT VISIT, EST, LEVL III, 20-29 MIN: ICD-10-PCS | Mod: S$GLB,,, | Performed by: INTERNAL MEDICINE

## 2023-01-12 PROCEDURE — 99213 OFFICE O/P EST LOW 20 MIN: CPT | Mod: S$GLB,,, | Performed by: INTERNAL MEDICINE

## 2023-01-12 RX ORDER — BUDESONIDE AND FORMOTEROL FUMARATE DIHYDRATE 160; 4.5 UG/1; UG/1
2 AEROSOL RESPIRATORY (INHALATION) EVERY 12 HOURS
Qty: 10.2 G | Refills: 11 | Status: SHIPPED | OUTPATIENT
Start: 2023-01-12 | End: 2023-08-03 | Stop reason: SDUPTHER

## 2023-01-12 NOTE — PROGRESS NOTES
Subjective:       Patient ID: Sindhu Verdin is a 52 y.o. female.    Chief Complaint: Asthma    Sindhu Verdin has a past medical history of migraine, carpal tunnel, anxiety, asthma (mild intermittent), HLD, IBS, GERD, low back pain, osteoarthritis, suspected AMARA, sleep-related movement disorder.  She presents to re-establish care for her asthma.  She had a cold earlier this month.  This was treated with a z-pack and albuterol.  Before this, she had not used her albuterol in about a year.      Tobacco/vape: quit in 2004, before that smoked 2PPD for about 18 years.  Occupation: law enforcement  Exertional capacity: no limits  Prior lung disease: asthma diagnosed in 2004  Sputum production: only when sick  Allergies/sinusitis: none  GERD/Aspiration: does have, controlled with PPI as needed  Pets: dogs  Travel/TB: none.  Always negative when tested with her job in law enforcement  Respiratory regimen: PRN albuterol.  Symbicort  Prior cancer: none  Prior hospitalization/intubation: never  Snoring/apnea:  suspects sleep apnea.       Today she is doing well.  She is no longer wheezing and states her cold is passing her.  She is here today to re-establish care with a pulmonologist.             Review of Systems   Constitutional:  Negative for fever, chills, activity change, fatigue, night sweats and weakness.   HENT:  Negative for postnasal drip, rhinorrhea and congestion.    Eyes: Negative.    Respiratory:  Positive for apnea. Negative for cough, sputum production, shortness of breath, wheezing, dyspnea on extertion and use of rescue inhaler.    Cardiovascular:  Negative for chest pain, palpitations and leg swelling.   Genitourinary: Negative.    Endocrine: endocrine negative    Musculoskeletal: Negative.    Skin:  Negative for rash.   Gastrointestinal:  Positive for acid reflux. Negative for nausea, vomiting, abdominal pain and abdominal distention.   Neurological: Negative.   "  Psychiatric/Behavioral: Negative.       Objective:      Vitals:    01/12/23 0916   BP: 130/76   BP Location: Left arm   Patient Position: Sitting   Pulse: 75   SpO2: 98%   Weight: 102.3 kg (225 lb 8.5 oz)   Height: 5' 11" (1.803 m)       Physical Exam   Constitutional: She is oriented to person, place, and time. She appears well-developed and well-nourished. She is obese.   HENT:   Head: Normocephalic.   Neck: No JVD present.   Cardiovascular: Normal rate, regular rhythm and normal heart sounds. Exam reveals no gallop and no friction rub.   No murmur heard.  Pulmonary/Chest: Normal expansion, symmetric chest wall expansion, effort normal and breath sounds normal. No respiratory distress. She has no wheezes. She has no rhonchi.   Abdominal: Soft. Bowel sounds are normal. She exhibits no distension. There is no abdominal tenderness.   Musculoskeletal:         General: No edema. Normal range of motion.      Cervical back: Normal range of motion and neck supple.   Neurological: She is alert and oriented to person, place, and time.   Skin: Skin is warm and dry. She is not diaphoretic.   Psychiatric: She has a normal mood and affect. Her behavior is normal. Judgment and thought content normal.     Personal Diagnostic Review  None pertinent    No flowsheet data found.      Assessment:       1. Mild intermittent asthma without complication    2. Suspected sleep apnea    3. Gastroesophageal reflux disease, unspecified whether esophagitis present        Outpatient Encounter Medications as of 1/12/2023   Medication Sig Dispense Refill    cholecalciferol, vitamin D3, (VITAMIN D3) 50 mcg (2,000 unit) Cap TK 1 C PO ONCE D      diclofenac sodium (VOLTAREN) 1 % Gel Apply 2 g topically 4 (four) times daily. 3 each 2    EPINEPHrine (EPIPEN) 0.3 mg/0.3 mL AtIn Inject into the muscle.      ezetimibe (ZETIA) 10 mg tablet TK 1 T PO D      gabapentin (NEURONTIN) 300 MG capsule Take 1 capsule (300 mg total) by mouth 3 (three) times " daily. 90 capsule 3    HYDROcodone-acetaminophen (NORCO)  mg per tablet Take 1 tablet by mouth.      LINZESS 290 mcg Cap TK ONE C PO QD ON EMPTY STOMACH  2    magnesium oxide (MAG-OX) 400 mg (241.3 mg magnesium) tablet Take 1 tablet (400 mg total) by mouth once daily. 90 tablet 3    metoclopramide HCl (REGLAN) 10 MG tablet Take 1 tablet by mouth before meals and at bedtime as needed.      metoprolol succinate (TOPROL-XL) 25 MG 24 hr tablet Take 1 tablet (25 mg total) by mouth once daily. 90 tablet 3    oxybutynin (DITROPAN XL) 15 MG TR24 TK 1 T PO BID  0    PROAIR HFA 90 mcg/actuation inhaler Inhale 1 puff into the lungs every 4 (four) hours as needed.   0    traMADoL (ULTRAM) 50 mg tablet Take 50 mg by mouth every 6 (six) hours.      [DISCONTINUED] SYMBICORT 160-4.5 mcg/actuation HFAA Inhale 2 puffs into the lungs once daily.   0    diazePAM (VALIUM) 5 MG tablet Take 1 tablet (5 mg total) by mouth every evening. for 20 days 20 tablet 0    imipramine (TOFRANIL) 25 MG tablet Take 1 tablet (25 mg total) by mouth every evening. 30 tablet 11     Facility-Administered Encounter Medications as of 1/12/2023   Medication Dose Route Frequency Provider Last Rate Last Admin    0.9%  NaCl infusion   Intravenous Continuous Deb Bolivar MD   Stopped at 03/18/22 1055    fentaNYL injection 25 mcg  25 mcg Intravenous Q5 Min PRN Pam Siddiqui MD        midazolam (VERSED) 1 mg/mL injection 0.5 mg  0.5 mg Intravenous PRN Pam Siddiqui MD   2 mg at 02/12/21 1053    sodium chloride 0.9% flush 3 mL  3 mL Intravenous PRN Deb Bolivar MD         Orders Placed This Encounter   Procedures    Ambulatory referral/consult to Sleep Disorders     Standing Status:   Future     Standing Expiration Date:   2/12/2024     Referral Priority:   Routine     Referral Type:   Consultation     Requested Specialty:   Sleep Medicine     Number of Visits Requested:   1       Plan:         Problem List Items Addressed This Visit           Pulmonary    Mild intermittent asthma without complication - Primary    Overview     Mild intermittent disease.  Currently taking PRN albuterol and symbicort.  She had a recent cold in January and had to start her albuterol back.  Before this, she had not used albuterol in over a year.    - continue PRN albuterol and symbicort.  - if symptoms worsen, she was noted to notify me for steroid trial and possible adjustment of maintenance inhaler therapy.            GI    GERD (gastroesophageal reflux disease)    Overview     Continue PPI            Other    Suspected sleep apnea    Overview     , who is diagnosed with AMARA, notes patient has snoring and apneic spells at night that prompts him to wake the patient up.  She has previously undergone evaluation for AMARA in the past, but would like re-evaluation.  - referral to sleep med.          RTC 12 months or sooner PRKIRAN Sorenson MD  [facility]

## 2023-04-19 ENCOUNTER — HOSPITAL ENCOUNTER (OUTPATIENT)
Dept: RADIOLOGY | Facility: HOSPITAL | Age: 53
Discharge: HOME | End: 2023-04-19
Attending: ORTHOPAEDIC SURGERY
Payer: MEDICARE

## 2023-04-19 ENCOUNTER — OFFICE VISIT (OUTPATIENT)
Dept: ORTHOPEDICS | Facility: CLINIC | Age: 53
End: 2023-04-19
Payer: MEDICARE

## 2023-04-19 VITALS — WEIGHT: 225.5 LBS | HEIGHT: 71 IN | BODY MASS INDEX: 31.57 KG/M2

## 2023-04-19 DIAGNOSIS — M25.532 LEFT WRIST PAIN: Primary | ICD-10-CM

## 2023-04-19 DIAGNOSIS — M25.532 LEFT WRIST PAIN: ICD-10-CM

## 2023-04-19 PROCEDURE — 99999 PR PBB SHADOW E&M-EST. PATIENT-LVL III: CPT | Mod: PBBFAC,,, | Performed by: ORTHOPAEDIC SURGERY

## 2023-04-19 PROCEDURE — 73110 X-RAY EXAM OF WRIST: CPT | Mod: 26,LT,, | Performed by: RADIOLOGY

## 2023-04-19 PROCEDURE — 99213 OFFICE O/P EST LOW 20 MIN: CPT | Mod: 25,S$GLB,, | Performed by: ORTHOPAEDIC SURGERY

## 2023-04-19 PROCEDURE — 73110 XR WRIST COMPLETE 3 VIEWS LEFT: ICD-10-PCS | Mod: 26,LT,, | Performed by: RADIOLOGY

## 2023-04-19 PROCEDURE — 73110 X-RAY EXAM OF WRIST: CPT | Mod: TC,PN,LT

## 2023-04-19 PROCEDURE — 99999 PR PBB SHADOW E&M-EST. PATIENT-LVL III: ICD-10-PCS | Mod: PBBFAC,,, | Performed by: ORTHOPAEDIC SURGERY

## 2023-04-19 PROCEDURE — 20605 INTERMEDIATE JOINT ASPIRATION/INJECTION: ICD-10-PCS | Mod: LT,S$GLB,, | Performed by: ORTHOPAEDIC SURGERY

## 2023-04-19 PROCEDURE — 1159F PR MEDICATION LIST DOCUMENTED IN MEDICAL RECORD: ICD-10-PCS | Mod: CPTII,S$GLB,, | Performed by: ORTHOPAEDIC SURGERY

## 2023-04-19 PROCEDURE — 1159F MED LIST DOCD IN RCRD: CPT | Mod: CPTII,S$GLB,, | Performed by: ORTHOPAEDIC SURGERY

## 2023-04-19 PROCEDURE — 99213 PR OFFICE/OUTPT VISIT, EST, LEVL III, 20-29 MIN: ICD-10-PCS | Mod: 25,S$GLB,, | Performed by: ORTHOPAEDIC SURGERY

## 2023-04-19 PROCEDURE — 20605 DRAIN/INJ JOINT/BURSA W/O US: CPT | Mod: LT,S$GLB,, | Performed by: ORTHOPAEDIC SURGERY

## 2023-04-19 PROCEDURE — 3008F PR BODY MASS INDEX (BMI) DOCUMENTED: ICD-10-PCS | Mod: CPTII,S$GLB,, | Performed by: ORTHOPAEDIC SURGERY

## 2023-04-19 PROCEDURE — 3008F BODY MASS INDEX DOCD: CPT | Mod: CPTII,S$GLB,, | Performed by: ORTHOPAEDIC SURGERY

## 2023-04-19 RX ORDER — TRIAMCINOLONE ACETONIDE 40 MG/ML
20 INJECTION, SUSPENSION INTRA-ARTICULAR; INTRAMUSCULAR
Status: DISCONTINUED | OUTPATIENT
Start: 2023-04-19 | End: 2023-04-19 | Stop reason: HOSPADM

## 2023-04-19 RX ORDER — NAPROXEN 500 MG/1
500 TABLET ORAL EVERY 12 HOURS
COMMUNITY
Start: 2023-04-09

## 2023-04-19 RX ORDER — ROSUVASTATIN CALCIUM 20 MG/1
20 TABLET, COATED ORAL
COMMUNITY
Start: 2023-02-02

## 2023-04-19 RX ORDER — LIDOCAINE HYDROCHLORIDE 10 MG/ML
0.5 INJECTION INFILTRATION; PERINEURAL
Status: DISCONTINUED | OUTPATIENT
Start: 2023-04-19 | End: 2023-04-19 | Stop reason: HOSPADM

## 2023-04-19 RX ADMIN — TRIAMCINOLONE ACETONIDE 20 MG: 40 INJECTION, SUSPENSION INTRA-ARTICULAR; INTRAMUSCULAR at 01:04

## 2023-04-19 RX ADMIN — LIDOCAINE HYDROCHLORIDE 0.5 ML: 10 INJECTION INFILTRATION; PERINEURAL at 01:04

## 2023-04-19 NOTE — PROGRESS NOTES
Subjective:      Patient ID: Sindhu Verdin is a 52 y.o. female.    Chief Complaint: Wrist Pain (left - x 3 weeks ) and Joint Swelling (left wrist )      HPI: Sindhu Verdin is here with complaints of acute left wrist pain.  She notes pain started about 3 weeks ago and is associated with some swelling.  Pain is located at the ulnar side of the wrist. She notes that she has been moving boxes at her home in preparation for a move.  She does take Norco and tramadol which has not seemed to be helpful.  She has not been taking naproxen.  She is right-hand dominant    Past Medical History:   Diagnosis Date    Asthma     GERD (gastroesophageal reflux disease)     Hyperlipidemia     IBS (irritable bowel syndrome)     Primary osteoarthritis of left knee 5/5/2021    Sleep apnea     Tension headache, chronic     Vaginal delivery     x3       Current Outpatient Medications:     cholecalciferol, vitamin D3, (VITAMIN D3) 50 mcg (2,000 unit) Cap, TK 1 C PO ONCE D, Disp: , Rfl:     ezetimibe (ZETIA) 10 mg tablet, TK 1 T PO D, Disp: , Rfl:     gabapentin (NEURONTIN) 300 MG capsule, Take 1 capsule (300 mg total) by mouth 3 (three) times daily., Disp: 90 capsule, Rfl: 3    LINZESS 290 mcg Cap, TK ONE C PO QD ON EMPTY STOMACH, Disp: , Rfl: 2    magnesium oxide (MAG-OX) 400 mg (241.3 mg magnesium) tablet, Take 1 tablet (400 mg total) by mouth once daily., Disp: 90 tablet, Rfl: 3    metoclopramide HCl (REGLAN) 10 MG tablet, Take 1 tablet by mouth before meals and at bedtime as needed., Disp: , Rfl:     metoprolol succinate (TOPROL-XL) 25 MG 24 hr tablet, Take 1 tablet (25 mg total) by mouth once daily., Disp: 90 tablet, Rfl: 3    naproxen (NAPROSYN) 500 MG tablet, Take 500 mg by mouth every 12 (twelve) hours., Disp: , Rfl:     oxybutynin (DITROPAN XL) 15 MG TR24, TK 1 T PO BID, Disp: , Rfl: 0    PROAIR HFA 90 mcg/actuation inhaler, Inhale 1 puff into the lungs every 4 (four) hours as needed. , Disp: , Rfl: 0    " rosuvastatin (CRESTOR) 20 MG tablet, Take 20 mg by mouth., Disp: , Rfl:     budesonide-formoterol 160-4.5 mcg (SYMBICORT) 160-4.5 mcg/actuation HFAA, Inhale 2 puffs into the lungs every 12 (twelve) hours. (Patient not taking: Reported on 4/19/2023), Disp: 10.2 g, Rfl: 11    diazePAM (VALIUM) 5 MG tablet, Take 1 tablet (5 mg total) by mouth every evening. for 20 days, Disp: 20 tablet, Rfl: 0    diclofenac sodium (VOLTAREN) 1 % Gel, Apply 2 g topically 4 (four) times daily. (Patient not taking: Reported on 4/19/2023), Disp: 3 each, Rfl: 2    EPINEPHrine (EPIPEN) 0.3 mg/0.3 mL AtIn, Inject into the muscle., Disp: , Rfl:     HYDROcodone-acetaminophen (NORCO)  mg per tablet, Take 1 tablet by mouth., Disp: , Rfl:     imipramine (TOFRANIL) 25 MG tablet, Take 1 tablet (25 mg total) by mouth every evening., Disp: 30 tablet, Rfl: 11    traMADoL (ULTRAM) 50 mg tablet, Take 50 mg by mouth every 6 (six) hours., Disp: , Rfl:   No current facility-administered medications for this visit.    Facility-Administered Medications Ordered in Other Visits:     0.9%  NaCl infusion, , Intravenous, Continuous, Deb Bolivar MD, Stopped at 03/18/22 1055    fentaNYL injection 25 mcg, 25 mcg, Intravenous, Q5 Min PRN, Pam Siddiqui MD    midazolam (VERSED) 1 mg/mL injection 0.5 mg, 0.5 mg, Intravenous, PRN, Pam Siddiqui MD, 2 mg at 02/12/21 1053    sodium chloride 0.9% flush 3 mL, 3 mL, Intravenous, PRN, Deb Bolivar MD  Review of patient's allergies indicates:   Allergen Reactions    Lipitor [atorvastatin]      hair loss    Statins-hmg-coa reductase inhibitors      Hair loss       Ht 5' 11" (1.803 m)   Wt 102.3 kg (225 lb 8.5 oz)   LMP  (LMP Unknown)   BMI 31.46 kg/m²     Review of Systems   Constitutional: Negative for chills and fever.   Cardiovascular:  Negative for chest pain and palpitations.   Respiratory:  Negative for shortness of breath and wheezing.    Skin:  Negative for poor wound healing and rash. "   Musculoskeletal:  Positive for joint pain and joint swelling.   Gastrointestinal:  Negative for nausea and vomiting.   Genitourinary:  Negative for dysuria and hematuria.   Neurological:  Negative for seizures and tremors.   Psychiatric/Behavioral:  Negative for altered mental status.    Allergic/Immunologic: Negative for environmental allergies and persistent infections.       Objective:    Ortho Exam       GEN: Well developed, well nourished female. AAOX3. No acute distress.   Normocephalic, atraumatic.   SHAQUILLE  Breathing unlabored.  Mood and affect appropriate.   Left UE: Skin intact. Swelling minimal. TTP ulnar-sided wrist. ROM full, pain with extension and ulnar deviation. Sensation intact. Tinels negative. Pulses present. Cap refill within normal limits.  strength intact. Special tests:  None    Assessment:     Imaging:   I have personally reviewed and interpreted the radiographs. Xray of the left wrist shows unremarkable findings.  No acute fractures or dislocations          1. Left wrist pain          Plan:       I signed the nature of ulnar-sided wrist pain, possibly related to tendinitis  I recommended informed corticosteroid injection without complication.  Patient tolerated well  Restart Naprosyn  Wrist brace fitted and provided for patient to use while she is moving/ lifting/ gripping only    Orders Placed This Encounter    Intermediate Joint Aspiration/Injection    X-Ray Wrist Complete Left     No follow-ups on file.

## 2023-04-19 NOTE — PROCEDURES
Intermediate Joint Aspiration/Injection    Date/Time: 4/19/2023 1:00 PM  Performed by: Mia Millard PA-C  Authorized by: Mia Millard PA-C     Consent Done?:  Yes (Verbal)  Indications:  Pain  Site marked: The procedure site was marked    Timeout: Prior to procedure the correct patient, procedure, and site was verified      Location:  Wrist  Wrist joint: Ulnar carpal.  Prep: Patient was prepped and draped in usual sterile fashion    Needle size:  25 G  Approach:  Anterolateral  Medications:  0.5 mL LIDOcaine HCL 10 mg/ml (1%) 10 mg/mL (1 %); 20 mg triamcinolone acetonide 40 mg/mL  Patient tolerance:  Patient tolerated the procedure well with no immediate complications

## 2023-05-16 ENCOUNTER — TELEPHONE (OUTPATIENT)
Dept: OPHTHALMOLOGY | Facility: CLINIC | Age: 53
End: 2023-05-16
Payer: MEDICARE

## 2023-06-07 ENCOUNTER — TELEPHONE (OUTPATIENT)
Dept: OPHTHALMOLOGY | Facility: CLINIC | Age: 53
End: 2023-06-07
Payer: MEDICARE

## 2023-06-07 NOTE — TELEPHONE ENCOUNTER
----- Message from Ramona Forrester sent at 6/7/2023  1:26 PM CDT -----  Regarding: Cataract Eval  Pt called to f/u on scheduling cataract eval with Dr. Velazco.    Call [phone]

## 2023-06-16 ENCOUNTER — PATIENT MESSAGE (OUTPATIENT)
Dept: PODIATRY | Facility: CLINIC | Age: 53
End: 2023-06-16
Payer: MEDICARE

## 2023-07-03 ENCOUNTER — TELEPHONE (OUTPATIENT)
Dept: OPHTHALMOLOGY | Facility: CLINIC | Age: 53
End: 2023-07-03
Payer: MEDICARE

## 2023-08-01 ENCOUNTER — TELEPHONE (OUTPATIENT)
Dept: OPHTHALMOLOGY | Facility: CLINIC | Age: 53
End: 2023-08-01
Payer: MEDICARE

## 2023-08-03 ENCOUNTER — PATIENT MESSAGE (OUTPATIENT)
Dept: PULMONOLOGY | Facility: CLINIC | Age: 53
End: 2023-08-03
Payer: MEDICARE

## 2023-08-03 DIAGNOSIS — J45.20 MILD INTERMITTENT ASTHMA WITHOUT COMPLICATION: ICD-10-CM

## 2023-08-03 RX ORDER — BUDESONIDE AND FORMOTEROL FUMARATE DIHYDRATE 160; 4.5 UG/1; UG/1
2 AEROSOL RESPIRATORY (INHALATION) EVERY 12 HOURS
Qty: 10.2 G | Refills: 11 | Status: SHIPPED | OUTPATIENT
Start: 2023-08-03

## 2023-08-04 DIAGNOSIS — Z12.31 SCREENING MAMMOGRAM, ENCOUNTER FOR: Primary | ICD-10-CM

## 2023-08-08 ENCOUNTER — TELEPHONE (OUTPATIENT)
Dept: OPHTHALMOLOGY | Facility: CLINIC | Age: 53
End: 2023-08-08
Payer: MEDICARE

## 2023-08-08 NOTE — TELEPHONE ENCOUNTER
----- Message from Bernadette Hrady sent at 8/8/2023 11:06 AM CDT -----  Contact: pt @ [phone]  BLANCA HILL WILSON calling regarding Patient Advice (message) for #pt is returning call from [place], asking for call back

## 2023-08-08 NOTE — TELEPHONE ENCOUNTER
----- Message from Fe Velazco MD sent at 8/8/2023  9:34 AM CDT -----  Pls call and change her appt time thursd to 8AM. It doesn't make sense to have an 8am POD1 and then nothing for 30 mins, let's get the cat evals in and out early - lmk when complete.

## 2023-08-09 ENCOUNTER — PATIENT MESSAGE (OUTPATIENT)
Dept: DERMATOLOGY | Facility: CLINIC | Age: 53
End: 2023-08-09
Payer: MEDICARE

## 2023-08-21 ENCOUNTER — OFFICE VISIT (OUTPATIENT)
Dept: OPHTHALMOLOGY | Facility: CLINIC | Age: 53
End: 2023-08-21
Payer: MEDICARE

## 2023-08-21 DIAGNOSIS — H25.11 NUCLEAR SCLEROTIC CATARACT OF RIGHT EYE: Primary | ICD-10-CM

## 2023-08-21 DIAGNOSIS — H25.12 NUCLEAR SCLEROTIC CATARACT OF LEFT EYE: ICD-10-CM

## 2023-08-21 DIAGNOSIS — H52.7 REFRACTIVE ERRORS: ICD-10-CM

## 2023-08-21 PROCEDURE — 1159F MED LIST DOCD IN RCRD: CPT | Mod: CPTII,S$GLB,, | Performed by: OPHTHALMOLOGY

## 2023-08-21 PROCEDURE — 99205 OFFICE O/P NEW HI 60 MIN: CPT | Mod: S$GLB,,, | Performed by: OPHTHALMOLOGY

## 2023-08-21 PROCEDURE — 99999 PR PBB SHADOW E&M-EST. PATIENT-LVL III: CPT | Mod: PBBFAC,,, | Performed by: OPHTHALMOLOGY

## 2023-08-21 PROCEDURE — 1159F PR MEDICATION LIST DOCUMENTED IN MEDICAL RECORD: ICD-10-PCS | Mod: CPTII,S$GLB,, | Performed by: OPHTHALMOLOGY

## 2023-08-21 PROCEDURE — 92015 PR REFRACTION: ICD-10-PCS | Mod: S$GLB,,, | Performed by: OPHTHALMOLOGY

## 2023-08-21 PROCEDURE — 99205 PR OFFICE/OUTPT VISIT, NEW, LEVL V, 60-74 MIN: ICD-10-PCS | Mod: S$GLB,,, | Performed by: OPHTHALMOLOGY

## 2023-08-21 PROCEDURE — 92015 DETERMINE REFRACTIVE STATE: CPT | Mod: S$GLB,,, | Performed by: OPHTHALMOLOGY

## 2023-08-21 PROCEDURE — 99999 PR PBB SHADOW E&M-EST. PATIENT-LVL III: ICD-10-PCS | Mod: PBBFAC,,, | Performed by: OPHTHALMOLOGY

## 2023-08-21 NOTE — PROGRESS NOTES
HPI    Rer:  [facility].    Sx: none    Gtts: None     Patient is here today for cataract evaluation. Pt. States vision is blurry   at distance and near. Pt. States vision is dim and has difficulty driving   at night. Pt. States having pressure headaches from forcing her vision and   with glasses headaches worsen. Pain level 6. Pt. states having itchy OU,   tearing but mainly the OS. Pt. Denies diplopia, flashes/floater.  Last edited by Shanice Francis on 8/21/2023 10:13 AM.            Assessment /Plan     For exam results, see Encounter Report.    Nuclear sclerotic cataract of right eye  -     Cancel: IOL Master - OD - Right Eye    Nuclear sclerotic cataract of left eye    Refractive errors        NSC OU  - early, pt not anxious for sx at this time     RE: Mrx = Rx    F/up 1 yr - optom, me prn

## 2023-08-29 ENCOUNTER — OFFICE VISIT (OUTPATIENT)
Dept: DERMATOLOGY | Facility: CLINIC | Age: 53
End: 2023-08-29
Payer: MEDICARE

## 2023-08-29 ENCOUNTER — PATIENT MESSAGE (OUTPATIENT)
Dept: DERMATOLOGY | Facility: CLINIC | Age: 53
End: 2023-08-29

## 2023-08-29 DIAGNOSIS — L80 VITILIGO: Primary | ICD-10-CM

## 2023-08-29 DIAGNOSIS — L23.0 ALLERGIC CONTACT DERMATITIS DUE TO METALS: ICD-10-CM

## 2023-08-29 PROCEDURE — 1160F PR REVIEW ALL MEDS BY PRESCRIBER/CLIN PHARMACIST DOCUMENTED: ICD-10-PCS | Mod: CPTII,S$GLB,, | Performed by: STUDENT IN AN ORGANIZED HEALTH CARE EDUCATION/TRAINING PROGRAM

## 2023-08-29 PROCEDURE — 99204 PR OFFICE/OUTPT VISIT, NEW, LEVL IV, 45-59 MIN: ICD-10-PCS | Mod: S$GLB,,, | Performed by: STUDENT IN AN ORGANIZED HEALTH CARE EDUCATION/TRAINING PROGRAM

## 2023-08-29 PROCEDURE — 1160F RVW MEDS BY RX/DR IN RCRD: CPT | Mod: CPTII,S$GLB,, | Performed by: STUDENT IN AN ORGANIZED HEALTH CARE EDUCATION/TRAINING PROGRAM

## 2023-08-29 PROCEDURE — 1159F MED LIST DOCD IN RCRD: CPT | Mod: CPTII,S$GLB,, | Performed by: STUDENT IN AN ORGANIZED HEALTH CARE EDUCATION/TRAINING PROGRAM

## 2023-08-29 PROCEDURE — 99999 PR PBB SHADOW E&M-EST. PATIENT-LVL III: CPT | Mod: PBBFAC,,, | Performed by: STUDENT IN AN ORGANIZED HEALTH CARE EDUCATION/TRAINING PROGRAM

## 2023-08-29 PROCEDURE — 1159F PR MEDICATION LIST DOCUMENTED IN MEDICAL RECORD: ICD-10-PCS | Mod: CPTII,S$GLB,, | Performed by: STUDENT IN AN ORGANIZED HEALTH CARE EDUCATION/TRAINING PROGRAM

## 2023-08-29 PROCEDURE — 99999 PR PBB SHADOW E&M-EST. PATIENT-LVL III: ICD-10-PCS | Mod: PBBFAC,,, | Performed by: STUDENT IN AN ORGANIZED HEALTH CARE EDUCATION/TRAINING PROGRAM

## 2023-08-29 PROCEDURE — 99204 OFFICE O/P NEW MOD 45 MIN: CPT | Mod: S$GLB,,, | Performed by: STUDENT IN AN ORGANIZED HEALTH CARE EDUCATION/TRAINING PROGRAM

## 2023-08-29 RX ORDER — BETAMETHASONE DIPROPIONATE 0.5 MG/G
OINTMENT TOPICAL 2 TIMES DAILY
Qty: 45 G | Refills: 1 | Status: SHIPPED | OUTPATIENT
Start: 2023-08-29

## 2023-08-29 NOTE — PROGRESS NOTES
Subjective:      Patient ID:  Sindhu Verdin is a 53 y.o. female who presents for   Chief Complaint   Patient presents with    Spot     Left wrist     Pt is here with c/o lightening of skin around her left wrist, has been present for about 1 month.       Spot    She wears 2 watches--one is stainless steel, the other is an apple watch. She started wearing the apple watch about 2 months ago. About 1 month ago the area under the watch became itchy. Her skin in that area is now turning white. She does have a history of getting a rash to any jewelry that is not gold. Has not gotten any white spots anywhere else    Review of Systems    Objective:   Physical Exam   Constitutional: She appears well-developed and well-nourished. No distress.   Neurological: She is alert and oriented to person, place, and time. She is not disoriented.   Psychiatric: She has a normal mood and affect.   Skin:   Areas Examined (abnormalities noted in diagram):   LUE Inspection Performed            Diagram Legend     Erythematous scaling macule/papule c/w actinic keratosis       Vascular papule c/w angioma      Pigmented verrucoid papule/plaque c/w seborrheic keratosis      Yellow umbilicated papule c/w sebaceous hyperplasia      Irregularly shaped tan macule c/w lentigo     1-2 mm smooth white papules consistent with Milia      Movable subcutaneous cyst with punctum c/w epidermal inclusion cyst      Subcutaneous movable cyst c/w pilar cyst      Firm pink to brown papule c/w dermatofibroma      Pedunculated fleshy papule(s) c/w skin tag(s)      Evenly pigmented macule c/w junctional nevus     Mildly variegated pigmented, slightly irregular-bordered macule c/w mildly atypical nevus      Flesh colored to evenly pigmented papule c/w intradermal nevus       Pink pearly papule/plaque c/w basal cell carcinoma      Erythematous hyperkeratotic cursted plaque c/w SCC      Surgical scar with no sign of skin cancer recurrence      Open and  closed comedones      Inflammatory papules and pustules      Verrucoid papule consistent consistent with wart     Erythematous eczematous patches and plaques     Dystrophic onycholytic nail with subungual debris c/w onychomycosis     Umbilicated papule    Erythematous-base heme-crusted tan verrucoid plaque consistent with inflamed seborrheic keratosis     Erythematous Silvery Scaling Plaque c/w Psoriasis     See annotation    Assessment / Plan:        Hypo- and depigmented patch on wrist. Happened after itchy rash under watch. Patient with known ACD to metal. Ddx includes postinflammatory hypopigmentation 2/2 ACD vs vitiligo keobnorizing into area of dermatitis. Will send opzelura to use bid until repigments. F/u if not repigmenting or spreading to new areas  - sent opzelura--denied by insurance  - betamethasone diproprionate 0.05% ointment BID to affected areas for up to 2 weeks then take 1 week break or wean to TIW PRN. Avoid use on face and groin. Counseled on RBSE including atrophy and hypopigmentation    Allergic contact dermatitis due to metals  - can consider patch testing in the future. Currently well controlled using only gold and stainless steel jewelry. Likely nickel allergy. Counseled on allergen avoidance           Follow up for Dr. Bueno for hair loss.

## 2023-10-06 ENCOUNTER — TELEPHONE (OUTPATIENT)
Dept: DERMATOLOGY | Facility: CLINIC | Age: 53
End: 2023-10-06
Payer: MEDICARE

## 2023-10-06 NOTE — TELEPHONE ENCOUNTER
I spoke with  Joe Verdin and informed her that Dr. Bueno will be out due to surgery.  I offered her the date of November 11th to rescheduled her appointment and she accepted.  She thanked me for the call.

## 2023-10-23 RX ORDER — METOPROLOL SUCCINATE 25 MG/1
25 TABLET, EXTENDED RELEASE ORAL
Qty: 90 TABLET | Refills: 3 | Status: SHIPPED | OUTPATIENT
Start: 2023-10-23

## 2023-11-10 ENCOUNTER — TELEPHONE (OUTPATIENT)
Dept: DERMATOLOGY | Facility: CLINIC | Age: 53
End: 2023-11-10
Payer: MEDICARE

## 2024-01-17 ENCOUNTER — OFFICE VISIT (OUTPATIENT)
Dept: CARDIOLOGY | Facility: CLINIC | Age: 54
End: 2024-01-17
Payer: MEDICARE

## 2024-01-17 VITALS
HEIGHT: 71 IN | BODY MASS INDEX: 32.16 KG/M2 | WEIGHT: 229.75 LBS | DIASTOLIC BLOOD PRESSURE: 76 MMHG | SYSTOLIC BLOOD PRESSURE: 130 MMHG

## 2024-01-17 DIAGNOSIS — E78.00 PURE HYPERCHOLESTEROLEMIA: ICD-10-CM

## 2024-01-17 DIAGNOSIS — R00.2 PALPITATIONS: Primary | ICD-10-CM

## 2024-01-17 PROCEDURE — 99214 OFFICE O/P EST MOD 30 MIN: CPT | Mod: PBBFAC | Performed by: INTERNAL MEDICINE

## 2024-01-17 PROCEDURE — 93005 ELECTROCARDIOGRAM TRACING: CPT | Mod: PBBFAC | Performed by: INTERNAL MEDICINE

## 2024-01-17 NOTE — PROGRESS NOTES
Subjective:   Patient ID:  Sindhu Verdin is a 53 y.o. female who presents for follow-up of No chief complaint on file.      HPI      CP, asthma, palpitations, HLD, AMARA     4/11/17 LHC - EDP 9, EF 60%, normal coronaries     Stress echo 5/20/20  Normal left ventricular systolic function. The estimated ejection fraction is 55%.  Normal LV diastolic function.  Normal right ventricular systolic function.  Mild tricuspid regurgitation.  The estimated PA systolic pressure is 32 mmHg.  The ECG portion of this study is negative for myocardial ischemia.  The stress echo portion of this study is negative for myocardial ischemia.     Echo 1/17/18    1 - Normal left ventricular systolic function (EF 55-60%).     2 - Normal right ventricular systolic function .     3 - Normal left ventricular diastolic function.     4 - No wall motion abnormalities.     5.  Normal valvular function     Stress test 4/3/17  LVEF: 67 %  Impression: ABNORMAL MYOCARDIAL PERFUSION  1. There is evidence for moderate myocardial ischemia in the anterior wall of the left ventricle.   2. The perfusion scan is free of evidence for myocardial injury.   3. Resting wall motion is physiologic.   4. Resting LV function is normal.      Holter 1/17/18 - average HR 76, Few PVCs and PACS, no SVT or VT     8/22/18 Still gets occasional palpitations  Denies CP or SOB  EKG NSR - ok     9/5/19 Denies CP or SOB  EKG NSR - ok  Having some pains in her left lower back with numbness down left leg  Echo from 2016 showed mild AS - she has no cardiac murmur and no AS was seen on echo 1/17/18 - doubt she actually has AS  Cardiac stable  OV 1 year     Went to the ER 3/10/20  49-year-old complains of intermittent pain to her anterior chest for the last 2 days.  Patient was seen here 3 days ago secondary to a fall.  Patient injured her suprapubic area after she fell on to a trailer hitch.  At that ER visit patient had no chest pain or shortness of breath.  It was the  next day when her pain began.  She does feel occasional short of breath and feels like she cannot take a deep breath in.  She denies fever.  She does have an occasional cough.  She still notes swelling to her suprapubic area and has been taking hydrocodone for the pain.  Her appetite has been normal.  No fever.     EKG 3/10/20 NSR PVCs NSSTT changes     5/13/20 Still with exertional chest tightness and SOB   Stress echo for CP and SOB     5/27/20 CP improved - only with heavy lifting     8/14/20 Stable COLEMAN and atypical CP  Occasional palpitation  EKG NSR PVC - NSSTT changes - no change  BP well controlled     1/5/21 Still with atypical CP - thinks it's GERD  Denies recent palpitations  EKG NSR - ok    Continue Rx for atypical CP, palpitations, HLD  OV 6 months     7/8/21 Denies recent CP or SOB. Had TKR in May - still not exercising much  EKG 5/7/21 NSR - ok    Continue Rx for atypical CP, palpitations, HLD  OV 6 months     1/4/22 Denies CP, mild COLEMAN. BP controlled  EKG NSR - ok     Continue Rx for atypical CP, palpitations, HLD  OV 1 year      1/4/23 Denies CP or SOB  Suffering with URI  EKG NSR PRWP  BP elevated - controlled by outside readings     Continue Rx for atypical CP, palpitations, HLD  OV 1 year      1/17/24 Denies CP or SOB  Stays active - rides a bike  EKG NSR - ok  BP controlled         Review of Systems   Constitutional: Negative for decreased appetite.   HENT:  Negative for ear discharge.    Eyes:  Negative for blurred vision.   Respiratory:  Negative for hemoptysis.    Endocrine: Negative for polyphagia.   Hematologic/Lymphatic: Negative for adenopathy.   Skin:  Negative for color change.   Musculoskeletal:  Negative for joint swelling.   Genitourinary:  Negative for bladder incontinence.   Neurological:  Negative for brief paralysis.   Psychiatric/Behavioral:  Negative for hallucinations.    Allergic/Immunologic: Negative for hives.       Objective:   Physical Exam  Constitutional:        Appearance: She is well-developed.   HENT:      Head: Normocephalic and atraumatic.   Eyes:      Conjunctiva/sclera: Conjunctivae normal.      Pupils: Pupils are equal, round, and reactive to light.   Cardiovascular:      Rate and Rhythm: Normal rate.      Pulses: Intact distal pulses.      Heart sounds: Normal heart sounds.   Pulmonary:      Effort: Pulmonary effort is normal.      Breath sounds: Normal breath sounds.   Abdominal:      General: Bowel sounds are normal.      Palpations: Abdomen is soft.   Musculoskeletal:         General: Normal range of motion.      Cervical back: Normal range of motion and neck supple.   Skin:     General: Skin is warm and dry.   Neurological:      Mental Status: She is alert and oriented to person, place, and time.         Assessment:      1. Palpitations    2. Pure hypercholesterolemia        Plan:        Continue Rx for atypical CP, palpitations, HLD  OV 1 year

## 2024-03-17 ENCOUNTER — PATIENT MESSAGE (OUTPATIENT)
Dept: ADMINISTRATIVE | Facility: OTHER | Age: 54
End: 2024-03-17
Payer: MEDICARE

## 2024-03-22 ENCOUNTER — TELEPHONE (OUTPATIENT)
Dept: ORTHOPEDICS | Facility: CLINIC | Age: 54
End: 2024-03-22
Payer: MEDICARE

## 2024-03-22 NOTE — TELEPHONE ENCOUNTER
----- Message from Gian Graf sent at 3/21/2024  3:48 PM CDT -----  Contact: pt  Type:  Appointment Request    Caller is requesting a sooner appointment.  Caller declined first available appointment listed below.  Caller will not accept being placed on the waitlist and is requesting a message be sent to doctor.  Name of Caller:pt   When is the first available appointment?May  Symptoms:R hand carpal tunnel  Would the patient rather a call back or a response via [facility]? call  Best Call Back Number:[phone]  Additional Information:   Pt requesting to be seen before April 1st due to Ins.,

## 2024-04-29 ENCOUNTER — CLINICAL SUPPORT (OUTPATIENT)
Dept: FAMILY MEDICINE | Facility: CLINIC | Age: 54
End: 2024-04-29

## 2024-04-29 DIAGNOSIS — Z00.00 ROUTINE GENERAL MEDICAL EXAMINATION AT A HEALTH CARE FACILITY: Primary | ICD-10-CM

## 2024-04-29 PROCEDURE — 99202 OFFICE O/P NEW SF 15 MIN: CPT | Mod: S$GLB,,, | Performed by: FAMILY MEDICINE

## 2024-04-29 PROCEDURE — 82270 OCCULT BLOOD FECES: CPT | Mod: S$GLB,,, | Performed by: FAMILY MEDICINE

## 2024-04-29 PROCEDURE — 80053 COMPREHEN METABOLIC PANEL: CPT | Mod: QW,S$GLB,, | Performed by: FAMILY MEDICINE

## 2024-04-29 PROCEDURE — 86703 HIV-1/HIV-2 1 RESULT ANTBDY: CPT | Mod: S$GLB,,, | Performed by: FAMILY MEDICINE

## 2024-04-29 PROCEDURE — 80061 LIPID PANEL: CPT | Mod: QW,S$GLB,, | Performed by: FAMILY MEDICINE

## 2024-04-29 PROCEDURE — 86592 SYPHILIS TEST NON-TREP QUAL: CPT | Mod: S$GLB,,, | Performed by: FAMILY MEDICINE

## 2024-04-29 PROCEDURE — 81002 URINALYSIS NONAUTO W/O SCOPE: CPT | Mod: S$GLB,,, | Performed by: FAMILY MEDICINE

## 2024-04-29 PROCEDURE — 88142 CYTOPATH C/V THIN LAYER: CPT | Mod: S$GLB,,, | Performed by: FAMILY MEDICINE

## 2024-04-29 PROCEDURE — 85025 COMPLETE CBC W/AUTO DIFF WBC: CPT | Mod: QW,S$GLB,, | Performed by: FAMILY MEDICINE

## 2024-04-29 PROCEDURE — 86580 TB INTRADERMAL TEST: CPT | Mod: S$GLB,,, | Performed by: FAMILY MEDICINE

## 2024-04-29 PROCEDURE — 80305 DRUG TEST PRSMV DIR OPT OBS: CPT | Mod: S$GLB,,, | Performed by: FAMILY MEDICINE

## 2024-04-29 NOTE — PROGRESS NOTES
Sindhu has presented today for Pre-Hire screening on behalf of [facility]'s Office. Sindhu Verdin has completed Non-DOT Physical and Non-DOT Drug Screen .    Brisa Slater

## 2024-04-30 ENCOUNTER — TELEPHONE (OUTPATIENT)
Dept: FAMILY MEDICINE | Facility: CLINIC | Age: 54
End: 2024-04-30
Payer: MEDICARE

## 2024-04-30 NOTE — TELEPHONE ENCOUNTER
----- Message from Jana Cannon sent at 4/30/2024 10:06 AM CDT -----  Type:  Patient Returning Call    Who Called:Pt   Would the patient rather a call back or a response via [facility]? Call back   Best Call Back Number: [phone]   Additional Information: pt want to know if she can come in tomorrow around 2 pm with stool sample and TB results .. Going on a field trip for school with her Grandbaby

## 2024-07-31 ENCOUNTER — CLINICAL SUPPORT (OUTPATIENT)
Dept: FAMILY MEDICINE | Facility: CLINIC | Age: 54
End: 2024-07-31

## 2024-07-31 DIAGNOSIS — Z00.00 ROUTINE ADULT HEALTH MAINTENANCE: Primary | ICD-10-CM

## 2024-07-31 NOTE — PROGRESS NOTES
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 Sindhu Verdin has completed Non-DOT Physical.    Gretchen Morales

## 2024-10-16 RX ORDER — METOPROLOL SUCCINATE 25 MG/1
25 TABLET, EXTENDED RELEASE ORAL
Qty: 90 TABLET | Refills: 3 | Status: SHIPPED | OUTPATIENT
Start: 2024-10-16

## 2024-12-24 ENCOUNTER — TELEPHONE (OUTPATIENT)
Dept: ORTHOPEDICS | Facility: CLINIC | Age: 54
End: 2024-12-24
Payer: COMMERCIAL

## 2024-12-24 DIAGNOSIS — M25.539 PAIN IN WRIST, UNSPECIFIED LATERALITY: Primary | ICD-10-CM

## 2024-12-26 ENCOUNTER — HOSPITAL ENCOUNTER (OUTPATIENT)
Dept: RADIOLOGY | Facility: HOSPITAL | Age: 54
Discharge: HOME | End: 2024-12-26
Attending: ORTHOPAEDIC SURGERY
Payer: COMMERCIAL

## 2024-12-26 ENCOUNTER — OFFICE VISIT (OUTPATIENT)
Dept: ORTHOPEDICS | Facility: CLINIC | Age: 54
End: 2024-12-26
Payer: COMMERCIAL

## 2024-12-26 VITALS — BODY MASS INDEX: 31.94 KG/M2 | HEIGHT: 71 IN

## 2024-12-26 DIAGNOSIS — M25.531 RIGHT WRIST PAIN: ICD-10-CM

## 2024-12-26 DIAGNOSIS — G56.01 CARPAL TUNNEL SYNDROME OF RIGHT WRIST: Primary | ICD-10-CM

## 2024-12-26 DIAGNOSIS — M25.539 PAIN IN WRIST, UNSPECIFIED LATERALITY: ICD-10-CM

## 2024-12-26 PROCEDURE — 99999 PR PBB SHADOW E&M-EST. PATIENT-LVL IV: CPT | Mod: PBBFAC,,, | Performed by: ORTHOPAEDIC SURGERY

## 2024-12-26 PROCEDURE — 73110 X-RAY EXAM OF WRIST: CPT | Mod: 26,RT,, | Performed by: RADIOLOGY

## 2024-12-26 PROCEDURE — 73110 X-RAY EXAM OF WRIST: CPT | Mod: TC,PN,RT

## 2024-12-26 RX ORDER — IBUPROFEN 600 MG/1
600 TABLET ORAL 2 TIMES DAILY WITH MEALS
Qty: 60 TABLET | Refills: 1 | Status: SHIPPED | OUTPATIENT
Start: 2024-12-26

## 2024-12-26 NOTE — PROGRESS NOTES
Subjective:      Patient ID: Sindhu Verdin is a 54 y.o. female.    Chief Complaint: Injury and Cyst of the Right Wrist      HPI  Sindhu Verdin is a  54 y.o. female presenting today for right wrist pain.  There was a history of trauma.  Onset of symptoms began little over a month ago when she was injured at work   She was doing some pushing and pulling with a heavy cart when she experienced acute onset of pain in the right hand and wrist   Symptoms have been present since   She is describing pain at the base of the right thumb now on the radial side of the wrist   No numbness or tingling is reported   She has had previous carpal tunnel surgery in the past.      Review of patient's allergies indicates:   Allergen Reactions    Lipitor [atorvastatin]      hair loss    Statins-hmg-coa reductase inhibitors      Hair loss         Current Outpatient Medications   Medication Sig Dispense Refill    albuterol (PROVENTIL/VENTOLIN HFA) 90 mcg/actuation inhaler Inhale 1-2 puffs into the lungs every 6 (six) hours as needed for Wheezing. Rescue 18 g 1    amoxicillin-clavulanate 875-125mg (AUGMENTIN) 875-125 mg per tablet Take 1 tablet by mouth 2 (two) times daily. 14 tablet 0    budesonide-formoterol 160-4.5 mcg (SYMBICORT) 160-4.5 mcg/actuation HFAA Inhale 2 puffs into the lungs every 12 (twelve) hours. 10.2 g 11    cholecalciferol, vitamin D3, (VITAMIN D3) 50 mcg (2,000 unit) Cap TK 1 C PO ONCE D      EPINEPHrine (EPIPEN) 0.3 mg/0.3 mL AtIn Inject into the muscle.      ezetimibe (ZETIA) 10 mg tablet TK 1 T PO D      gabapentin (NEURONTIN) 300 MG capsule Take 1 capsule (300 mg total) by mouth 3 (three) times daily. 90 capsule 3    HYDROcodone-acetaminophen (NORCO)  mg per tablet Take 1 tablet by mouth.      LINZESS 290 mcg Cap TK ONE C PO QD ON EMPTY STOMACH  2    metoclopramide HCl (REGLAN) 10 MG tablet Take 1 tablet by mouth before meals and at bedtime as needed.      metoprolol succinate  (TOPROL-XL) 25 MG 24 hr tablet TAKE 1 TABLET(25 MG) BY MOUTH EVERY DAY 90 tablet 3    oxybutynin (DITROPAN XL) 15 MG TR24 TK 1 T PO BID  0    PROAIR HFA 90 mcg/actuation inhaler Inhale 1 puff into the lungs every 4 (four) hours as needed.   0    traMADoL (ULTRAM) 50 mg tablet Take 50 mg by mouth every 6 (six) hours.      betamethasone dipropionate (DIPROLENE) 0.05 % ointment Apply topically 2 (two) times daily. Use to affected areas for up to 2 weeks then take a 1 week break or decrease to 3 times weekly. Do not apply to groin or face. Use to white patch on wrist. 45 g 1    diazePAM (VALIUM) 5 MG tablet Take 1 tablet (5 mg total) by mouth every evening. for 20 days 20 tablet 0    diclofenac sodium (VOLTAREN) 1 % Gel Apply 2 g topically 4 (four) times daily. 3 each 2    imipramine (TOFRANIL) 25 MG tablet Take 1 tablet (25 mg total) by mouth every evening. 30 tablet 11    magnesium oxide (MAG-OX) 400 mg (241.3 mg magnesium) tablet Take 1 tablet (400 mg total) by mouth once daily. 90 tablet 3    naproxen (NAPROSYN) 500 MG tablet Take 500 mg by mouth every 12 (twelve) hours.      rosuvastatin (CRESTOR) 20 MG tablet Take 20 mg by mouth.      ruxolitinib 1.5 % Crea Apply 1 Application topically 2 (two) times daily. Apply to white patch on wrist 60 g 2     No current facility-administered medications for this visit.     Facility-Administered Medications Ordered in Other Visits   Medication Dose Route Frequency Provider Last Rate Last Admin    0.9%  NaCl infusion   Intravenous Continuous Deb Bolivar MD   Stopped at 03/18/22 1055    fentaNYL injection 25 mcg  25 mcg Intravenous Q5 Min PRN Pam Siddiqui MD        midazolam (VERSED) 1 mg/mL injection 0.5 mg  0.5 mg Intravenous PRN Pam Siddiqui MD   2 mg at 02/12/21 1053    sodium chloride 0.9% flush 3 mL  3 mL Intravenous PRN Deb Bolivar MD           Past Medical History:   Diagnosis Date    Asthma     GERD (gastroesophageal reflux disease)      Hyperlipidemia     IBS (irritable bowel syndrome)     Primary osteoarthritis of left knee 5/5/2021    Sleep apnea     Tension headache, chronic     Vaginal delivery     x3       Past Surgical History:   Procedure Laterality Date    BUNIONECTOMY Bilateral     CARPAL TUNNEL RELEASE Right 3/18/2022    Procedure: RELEASE, CARPAL TUNNEL;  Surgeon: Favian Correia Jr., MD;  Location: [facility] OR;  Service: Orthopedics;  Laterality: Right;    CHOLECYSTECTOMY      CHONDROPLASTY OF KNEE Left 2/12/2021    Procedure: CHONDROPLASTY, KNEE;  Surgeon: ADALBERTO Hill MD;  Location: [facility] OR;  Service: Orthopedics;  Laterality: Left;    COLONOSCOPY N/A 8/17/2018    Procedure: COLONOSCOPY;  Surgeon: Deb Bolivar MD;  Location: [facility] ENDO;  Service: Endoscopy;  Laterality: N/A;    EXCISION OF GANGLION CYST OF HAND Right 3/18/2022    Procedure: EXCISION, GANGLION CYST, HAND;  Surgeon: Favian Correia Jr., MD;  Location: [facility] OR;  Service: Orthopedics;  Laterality: Right;  right thumb    HYSTERECTOMY      KNEE ARTHROSCOPY W/ MENISCECTOMY Left 2/12/2021    Procedure: ARTHROSCOPY, KNEE, WITH MENISCECTOMY PARTIAL AND LATERAL;  Surgeon: ADALBERTO Hill MD;  Location: [facility] OR;  Service: Orthopedics;  Laterality: Left;  regional with catheter- adductor  pericapsular injection: 30cc ropivacaine/epi/clonidine/ketorolac injection    SUBCHONDROPLASTY Left 2/12/2021    Procedure: SUBCHONDROPLASTY, TIBIA, FEMUR;  Surgeon: ADALBERTO Hill MD;  Location: [facility] OR;  Service: Orthopedics;  Laterality: Left;    SYNOVECTOMY OF KNEE Left 2/12/2021    Procedure: SYNOVECTOMY, KNEE;  Surgeon: ADALBERTO Hill MD;  Location: [facility] OR;  Service: Orthopedics;  Laterality: Left;    TOTAL KNEE ARTHROPLASTY Left 5/5/2021    Procedure: ARTHROPLASTY, KNEE, TOTAL;  Surgeon: ADALBERTO Hill MD;  Location: [facility] OR;  Service: Orthopedics;  Laterality: Left;  outpatient with 23hr observation  regional with catheter- spinal & adductor       Review of  "Systems:  ROS    OBJECTIVE:     PHYSICAL EXAM:  Height: 5' 11" (180.3 cm)    Vitals:    12/26/24 1522   Height: 5' 11" (1.803 m)   PainSc:   8   PainLoc: Wrist     Well developed, well nourished female in no acute distress  Alert and oriented x 3  HEENT- Normal exam  Lungs- Clear to auscultation  Heart- Regular rate and rhythm  Abdomen- Soft nontender  Extremity exam- examination right hand and wrist there is some tenderness at the base of the right thumb and also the STT joint on the radial side of the wrist   Mild swelling   Range of motion wrist fingers full    strength slightly decreased   Tinel sign negative   Finkelstein test negative       RADIOGRAPHS:  AP lateral x-ray of the right hand and wrist show some degenerative changes of the STT joint and what maybe a small calcification or possibly an avulsion fracture off of the capsule of the STT joint  Comments: I have personally reviewed the imaging and I agree with the above radiologist's report.    ASSESSMENT/PLAN:     IMPRESSION:  1.  Right wrist injury traumatic with possible avulsion fracture subacute      2. Underlying STT arthritis     PLAN:  I explained the nature of the problem to the patient   I recommended a thumb spica splint for use during the day at work she can have it off at home   I have also ordered some physical therapy for her right hand and wrist and started her on Motrin 600 mg b.i.d. with food   She should remain on light duty for the present time no lifting more than 20 lb   Follow-up 3-4 weeks       - We talked at length about the anatomy and pathophysiology of   Encounter Diagnoses   Name Primary?    Carpal tunnel syndrome of right wrist Yes    Right wrist pain            Disclaimer: This note has been generated using voice-recognition software. There may be typographical errors that have been missed during proof-reading.     "

## 2024-12-27 ENCOUNTER — TELEPHONE (OUTPATIENT)
Dept: ORTHOPEDICS | Facility: CLINIC | Age: 54
End: 2024-12-27
Payer: COMMERCIAL

## 2024-12-27 NOTE — TELEPHONE ENCOUNTER
Informed pt notes from the visit can be found on the [facility] under the appointment tab. Pt verbalized understanding and will see if that is what she is needing for work.     ----- Message from Kimberly sent at 12/27/2024 10:01 AM CST -----  Type:  Patient Returning Call      Name of who is calling:pt        What is request in detail:patient is requesting a call back from nurse in regards to yesterday's visit. She just has some questions  about after visit summary, would like to get some clarification on it. Needs to speak with someone ASAP, her employers wont let her work without.        Can clinic reply by [facility]:no        What number to call back if not in [facility]: [phone]

## 2025-01-02 ENCOUNTER — TELEPHONE (OUTPATIENT)
Dept: ORTHOPEDICS | Facility: CLINIC | Age: 55
End: 2025-01-02
Payer: COMMERCIAL

## 2025-01-02 NOTE — TELEPHONE ENCOUNTER
----- Message from Nina sent at 1/2/2025  9:46 AM CST -----  Regarding: Unable to Reach Patient to Schedule Therapy Eval  Good morning,    The Physical/Occupational Therapy department received your order to get the attached patient scheduled for an evaluation. We have reached out to the patient to schedule their evaluation; however, we have been unsuccessful in reaching them.      We wanted you to be aware that your patient has not started therapy. If you speak with them again about starting therapy please have them reach out to us at [phone] to get scheduled.     Sincerely,    Nina Escalante

## 2025-01-08 PROBLEM — R53.1 WEAKNESS: Status: RESOLVED | Noted: 2021-05-11 | Resolved: 2025-01-08

## 2025-01-08 PROBLEM — R60.0 LOCALIZED EDEMA: Status: RESOLVED | Noted: 2021-05-11 | Resolved: 2025-01-08

## 2025-01-08 PROBLEM — R26.89 POOR BALANCE: Status: RESOLVED | Noted: 2021-02-17 | Resolved: 2025-01-08

## 2025-01-09 PROBLEM — R52 PAIN AGGRAVATED BY ACTIVITIES OF DAILY LIVING: Status: ACTIVE | Noted: 2025-01-09

## 2025-02-04 ENCOUNTER — TELEPHONE (OUTPATIENT)
Dept: ORTHOPEDICS | Facility: CLINIC | Age: 55
End: 2025-02-04
Payer: COMMERCIAL

## 2025-02-04 NOTE — TELEPHONE ENCOUNTER
----- Message from Favian Correia MD sent at 2/4/2025  9:32 AM CST -----  Contact: Darline  She is supposed to see me this week to discuss full duty  ----- Message -----  From: Mellissa Beach MA  Sent: 2/3/2025   4:50 PM CST  To: Favian oCrreia Jr., MD    Would it be ok for patient to released from light duty?  ----- Message -----  From: Rosenda Jaffe  Sent: 2/3/2025   4:08 PM CST  To: Masood HARTMAN Staff    Type:  Patient Call          Who Called: Patient         Does the patient know what this is regarding?: Requesting a call back ;pt said that she completed her therapy & she having training on tomorrow she would like to know if she can be released from light duty ; please advise          Would the patient rather a call back or a response via [facility]?call           Best Call Back Number: [phone]             Additional Information:

## 2025-02-06 ENCOUNTER — OFFICE VISIT (OUTPATIENT)
Dept: ORTHOPEDICS | Facility: CLINIC | Age: 55
End: 2025-02-06
Payer: COMMERCIAL

## 2025-02-06 ENCOUNTER — TELEPHONE (OUTPATIENT)
Dept: ORTHOPEDICS | Facility: CLINIC | Age: 55
End: 2025-02-06

## 2025-02-06 VITALS — HEIGHT: 71 IN | BODY MASS INDEX: 31.27 KG/M2

## 2025-02-06 DIAGNOSIS — M25.531 RIGHT WRIST PAIN: Primary | ICD-10-CM

## 2025-02-06 PROCEDURE — 1159F MED LIST DOCD IN RCRD: CPT | Mod: CPTII,S$GLB,, | Performed by: ORTHOPAEDIC SURGERY

## 2025-02-06 PROCEDURE — 3008F BODY MASS INDEX DOCD: CPT | Mod: CPTII,S$GLB,, | Performed by: ORTHOPAEDIC SURGERY

## 2025-02-06 PROCEDURE — 20605 DRAIN/INJ JOINT/BURSA W/O US: CPT | Mod: RT,S$GLB,, | Performed by: ORTHOPAEDIC SURGERY

## 2025-02-06 PROCEDURE — 99999 PR PBB SHADOW E&M-EST. PATIENT-LVL IV: CPT | Mod: PBBFAC,,, | Performed by: ORTHOPAEDIC SURGERY

## 2025-02-06 PROCEDURE — 99213 OFFICE O/P EST LOW 20 MIN: CPT | Mod: 25,S$GLB,, | Performed by: ORTHOPAEDIC SURGERY

## 2025-02-06 RX ORDER — TRIAMCINOLONE ACETONIDE 40 MG/ML
20 INJECTION, SUSPENSION INTRA-ARTICULAR; INTRAMUSCULAR
Status: COMPLETED | OUTPATIENT
Start: 2025-02-06 | End: 2025-02-06

## 2025-02-06 RX ORDER — DICLOFENAC SODIUM 10 MG/G
2 GEL TOPICAL 3 TIMES DAILY
Qty: 100 G | Refills: 1 | Status: SHIPPED | OUTPATIENT
Start: 2025-02-06

## 2025-02-06 RX ADMIN — TRIAMCINOLONE ACETONIDE 20 MG: 40 INJECTION, SUSPENSION INTRA-ARTICULAR; INTRAMUSCULAR at 04:02

## 2025-02-06 NOTE — PROGRESS NOTES
Subjective:      Patient ID: Sindhu Verdin is a 54 y.o. female.  Chief Complaint: Follow-up of the Right Wrist      HPI  Sindhu Verdin is a  54 y.o. female presenting today for follow up of right wrist injury.  She reports that she is improving she has completed physical therapy no numbness or tingling reported.    Review of patient's allergies indicates:   Allergen Reactions    Lipitor [atorvastatin]      hair loss    Statins-hmg-coa reductase inhibitors      Hair loss         Current Outpatient Medications   Medication Sig Dispense Refill    albuterol (PROVENTIL/VENTOLIN HFA) 90 mcg/actuation inhaler Inhale 1-2 puffs into the lungs every 6 (six) hours as needed for Wheezing. Rescue 18 g 1    amoxicillin-clavulanate 875-125mg (AUGMENTIN) 875-125 mg per tablet Take 1 tablet by mouth 2 (two) times daily. 14 tablet 0    budesonide-formoterol 160-4.5 mcg (SYMBICORT) 160-4.5 mcg/actuation HFAA Inhale 2 puffs into the lungs every 12 (twelve) hours. 10.2 g 11    cholecalciferol, vitamin D3, (VITAMIN D3) 50 mcg (2,000 unit) Cap TK 1 C PO ONCE D      EPINEPHrine (EPIPEN) 0.3 mg/0.3 mL AtIn Inject into the muscle.      ezetimibe (ZETIA) 10 mg tablet TK 1 T PO D      gabapentin (NEURONTIN) 300 MG capsule Take 1 capsule (300 mg total) by mouth 3 (three) times daily. 90 capsule 3    HYDROcodone-acetaminophen (NORCO)  mg per tablet Take 1 tablet by mouth.      ibuprofen (ADVIL,MOTRIN) 600 MG tablet Take 1 tablet (600 mg total) by mouth 2 (two) times daily with meals. 60 tablet 1    LINZESS 290 mcg Cap TK ONE C PO QD ON EMPTY STOMACH  2    metoclopramide HCl (REGLAN) 10 MG tablet Take 1 tablet by mouth before meals and at bedtime as needed.      metoprolol succinate (TOPROL-XL) 25 MG 24 hr tablet TAKE 1 TABLET(25 MG) BY MOUTH EVERY DAY 90 tablet 3    oxybutynin (DITROPAN XL) 15 MG TR24 TK 1 T PO BID  0    PROAIR HFA 90 mcg/actuation inhaler Inhale 1 puff into the lungs every 4 (four) hours as  needed.   0    traMADoL (ULTRAM) 50 mg tablet Take 50 mg by mouth every 6 (six) hours.      betamethasone dipropionate (DIPROLENE) 0.05 % ointment Apply topically 2 (two) times daily. Use to affected areas for up to 2 weeks then take a 1 week break or decrease to 3 times weekly. Do not apply to groin or face. Use to white patch on wrist. 45 g 1    diazePAM (VALIUM) 5 MG tablet Take 1 tablet (5 mg total) by mouth every evening. for 20 days 20 tablet 0    diclofenac sodium (VOLTAREN) 1 % Gel Apply 2 g topically 4 (four) times daily. 3 each 2    imipramine (TOFRANIL) 25 MG tablet Take 1 tablet (25 mg total) by mouth every evening. 30 tablet 11    magnesium oxide (MAG-OX) 400 mg (241.3 mg magnesium) tablet Take 1 tablet (400 mg total) by mouth once daily. 90 tablet 3    naproxen (NAPROSYN) 500 MG tablet Take 500 mg by mouth every 12 (twelve) hours.      rosuvastatin (CRESTOR) 20 MG tablet Take 20 mg by mouth.      ruxolitinib 1.5 % Crea Apply 1 Application topically 2 (two) times daily. Apply to white patch on wrist 60 g 2     No current facility-administered medications for this visit.     Facility-Administered Medications Ordered in Other Visits   Medication Dose Route Frequency Provider Last Rate Last Admin    0.9%  NaCl infusion   Intravenous Continuous Deb Bolivar MD   Stopped at 03/18/22 1055    fentaNYL injection 25 mcg  25 mcg Intravenous Q5 Min PRN Pam Siddiqui MD        midazolam (VERSED) 1 mg/mL injection 0.5 mg  0.5 mg Intravenous PRN Pam Siddiqui MD   2 mg at 02/12/21 1053    sodium chloride 0.9% flush 3 mL  3 mL Intravenous PRN Deb Bolivar MD           Past Medical History:   Diagnosis Date    Asthma     GERD (gastroesophageal reflux disease)     Hyperlipidemia     IBS (irritable bowel syndrome)     Primary osteoarthritis of left knee 5/5/2021    Sleep apnea     Tension headache, chronic     Vaginal delivery     x3       Past Surgical History:   Procedure Laterality Date     "BUNIONECTOMY Bilateral     CARPAL TUNNEL RELEASE Right 3/18/2022    Procedure: RELEASE, CARPAL TUNNEL;  Surgeon: Favian Correia Jr., MD;  Location: [facility] OR;  Service: Orthopedics;  Laterality: Right;    CHOLECYSTECTOMY      CHONDROPLASTY OF KNEE Left 2/12/2021    Procedure: CHONDROPLASTY, KNEE;  Surgeon: ADALBERTO Hill MD;  Location: [facility] OR;  Service: Orthopedics;  Laterality: Left;    COLONOSCOPY N/A 8/17/2018    Procedure: COLONOSCOPY;  Surgeon: Deb Bolivar MD;  Location: [facility] ENDO;  Service: Endoscopy;  Laterality: N/A;    EXCISION OF GANGLION CYST OF HAND Right 3/18/2022    Procedure: EXCISION, GANGLION CYST, HAND;  Surgeon: Favian Correia Jr., MD;  Location: [facility] OR;  Service: Orthopedics;  Laterality: Right;  right thumb    HYSTERECTOMY      KNEE ARTHROSCOPY W/ MENISCECTOMY Left 2/12/2021    Procedure: ARTHROSCOPY, KNEE, WITH MENISCECTOMY PARTIAL AND LATERAL;  Surgeon: ADALBERTO Hill MD;  Location: [facility] OR;  Service: Orthopedics;  Laterality: Left;  regional with catheter- adductor  pericapsular injection: 30cc ropivacaine/epi/clonidine/ketorolac injection    SUBCHONDROPLASTY Left 2/12/2021    Procedure: SUBCHONDROPLASTY, TIBIA, FEMUR;  Surgeon: ADALBERTO Hill MD;  Location: [facility] OR;  Service: Orthopedics;  Laterality: Left;    SYNOVECTOMY OF KNEE Left 2/12/2021    Procedure: SYNOVECTOMY, KNEE;  Surgeon: ADALBERTO Hill MD;  Location: [facility] OR;  Service: Orthopedics;  Laterality: Left;    TOTAL KNEE ARTHROPLASTY Left 5/5/2021    Procedure: ARTHROPLASTY, KNEE, TOTAL;  Surgeon: ADALBERTO Hill MD;  Location: [facility] OR;  Service: Orthopedics;  Laterality: Left;  outpatient with 23hr observation  regional with catheter- spinal & adductor       OBJECTIVE:   PHYSICAL EXAM:  Height: 5' 11" (180.3 cm)    Vitals:    02/06/25 1543   Height: 5' 11" (1.803 m)   PainSc: 0-No pain   PainLoc: Wrist     Ortho/SPM Exam  Examination right hand and wrist shows a small area of swelling on the volar radial " side of the wrist near the STT joint could be a small cyst or just some swelling of the joint   Slow tender to touch range of motion wrist fingers full   No triggering   Tinel sign negative       RADIOGRAPHS:  None  Comments: I have personally reviewed the imaging and I agree with the above radiologist's report.    ASSESSMENT/PLAN:     IMPRESSION:  1. Right wrist sprain improving.      2. Volar ganglion cyst right wrist    PLAN:  I recommended we try injection for the cyst which maybe just some swelling of the joint   After pause for time-out identified the right wrist injected volar STT with combination Kenalog 20 mg 0.5 cc xylocaine sterile technique   She tolerated the procedure well without complication   Recommended some Voltaren gel topical  I think she could return to full duty work now    FOLLOW UP:  1-2 months    Disclaimer: This note has been generated using voice-recognition software. There may be typographical errors that have been missed during proof-reading.

## 2025-03-25 ENCOUNTER — OFFICE VISIT (OUTPATIENT)
Facility: CLINIC | Age: 55
End: 2025-03-25
Payer: COMMERCIAL

## 2025-03-25 VITALS
SYSTOLIC BLOOD PRESSURE: 126 MMHG | WEIGHT: 235.25 LBS | BODY MASS INDEX: 32.94 KG/M2 | HEIGHT: 71 IN | DIASTOLIC BLOOD PRESSURE: 74 MMHG

## 2025-03-25 DIAGNOSIS — Z01.419 ENCNTR FOR GYN EXAM (GENERAL) (ROUTINE) W/O ABN FINDINGS: ICD-10-CM

## 2025-03-25 DIAGNOSIS — R30.0 DYSURIA: Primary | ICD-10-CM

## 2025-03-25 DIAGNOSIS — N32.81 OAB (OVERACTIVE BLADDER): ICD-10-CM

## 2025-03-25 PROCEDURE — 3008F BODY MASS INDEX DOCD: CPT | Mod: CPTII,S$GLB,, | Performed by: STUDENT IN AN ORGANIZED HEALTH CARE EDUCATION/TRAINING PROGRAM

## 2025-03-25 PROCEDURE — 3074F SYST BP LT 130 MM HG: CPT | Mod: CPTII,S$GLB,, | Performed by: STUDENT IN AN ORGANIZED HEALTH CARE EDUCATION/TRAINING PROGRAM

## 2025-03-25 PROCEDURE — 3078F DIAST BP <80 MM HG: CPT | Mod: CPTII,S$GLB,, | Performed by: STUDENT IN AN ORGANIZED HEALTH CARE EDUCATION/TRAINING PROGRAM

## 2025-03-25 PROCEDURE — 1159F MED LIST DOCD IN RCRD: CPT | Mod: CPTII,S$GLB,, | Performed by: STUDENT IN AN ORGANIZED HEALTH CARE EDUCATION/TRAINING PROGRAM

## 2025-03-25 PROCEDURE — 99386 PREV VISIT NEW AGE 40-64: CPT | Mod: S$GLB,,, | Performed by: STUDENT IN AN ORGANIZED HEALTH CARE EDUCATION/TRAINING PROGRAM

## 2025-03-25 PROCEDURE — 81002 URINALYSIS NONAUTO W/O SCOPE: CPT | Mod: S$GLB,,, | Performed by: STUDENT IN AN ORGANIZED HEALTH CARE EDUCATION/TRAINING PROGRAM

## 2025-03-25 PROCEDURE — 1160F RVW MEDS BY RX/DR IN RCRD: CPT | Mod: CPTII,S$GLB,, | Performed by: STUDENT IN AN ORGANIZED HEALTH CARE EDUCATION/TRAINING PROGRAM

## 2025-03-25 PROCEDURE — 99999 PR PBB SHADOW E&M-EST. PATIENT-LVL III: CPT | Mod: PBBFAC,,, | Performed by: STUDENT IN AN ORGANIZED HEALTH CARE EDUCATION/TRAINING PROGRAM

## 2025-03-25 RX ORDER — MIRABEGRON 25 MG/1
25 TABLET, FILM COATED, EXTENDED RELEASE ORAL DAILY
Qty: 30 TABLET | Refills: 11 | Status: SHIPPED | OUTPATIENT
Start: 2025-03-25 | End: 2026-03-25

## 2025-03-25 NOTE — PROGRESS NOTES
Subjective:    Patient ID: Sindhu Verdin is a 54 y.o. y.o. female.     Chief Complaint: Annual Well Woman Exam     History of Present Illness:  Sindhu presents today for Annual Well Woman exam. She is status post hysterectomy many years ago. She denies pelvic pain.  She denies breast tenderness, masses, nipple discharge. She admits to difficulty with urination- urgency and frequency.  She is sexually active. She admits to severe hot flashes and night sweats.     Menstrual History:   No LMP recorded (lmp unknown). Patient has had a hysterectomy..     OB History          3    Para   3    Term   3            AB        Living   3         SAB        IAB        Ectopic        Multiple        Live Births   3                 The following portions of the patient's history were reviewed and updated as appropriate: allergies, current medications, past family history, past medical history, past social history, past surgical history, and problem list.    ROS:   CONSTITUTIONAL: Negative for fever, chills, diaphoresis, weakness, fatigue, weight loss, weight gain  ENT: negative for sore throat, nasal congestion, nasal discharge, epistaxis, tinnitus, hearing loss  EYES: negative for blurry vision, decreased vision, loss of vision, eye pain, diplopia, photophobia, discharge  SKIN: Negative for rash, itching, hives  RESPIRATORY: negative for cough, hemoptysis, shortness of breath, pleuritic chest pain, wheezing  CARDIOVASCULAR: negative for chest pain, dyspnea on exertion, orthopnea, paroxysmal nocturnal dyspnea, edema, palpitations  BREAST: negative for breast  tenderness, breast mass, nipple discharge, or skin changes  GASTROINTESTINAL: negative for abdominal pain, flank pain, nausea, vomiting, diarrhea, constipation, black stool, blood in stool  GENITOURINARY: negative for abnormal vaginal bleeding, amenorrhea, decreased libido, dysuria, genital sores, hematuria, incontinence, menorrhagia, pelvic  pain, urinary frequency, vaginal discharge  HEMATOLOGIC/LYMPHATIC: negative for swollen lymph nodes, bleeding, bruising  MUSCULOSKELETAL: negative for back pain, joint pain, joint stiffness, joint swelling, muscle pain, muscle weakness  NEUROLOGICAL: negative for dizzy/vertigo, headache, focal weakness, numbness/tingling, speech problems, loss of consciousness, confusion, memory loss  BEHAVORIAL/PSYCH: negative for anxiety, depression, psychosis  ENDOCRINE: negative for polydipsia/polyuria, palpitations, skin changes, temperature intolerance, unexpected weight changes  ALLERGIC/IMMUNOLOGIC: negative for urticaria, hay fever, angioedema      Objective:    Vital Signs:  Vitals:    03/25/25 1024   BP: 126/74       Physical Exam:  General:  alert, cooperative, no distress   Skin:  Skin color, texture, turgor normal. No rashes or lesions   HEENT:  conjunctivae/corneas clear. PERRL.   Neck: supple, trachea midline, no adenopathy or thyromegally   Respiratory:  Normal effort   Breasts:  no discharge, erythema, tenderness, or palpable masses; no axillary lymphadenopathy   Abdomen:  soft, nontender, no palpable masses   Pelvis: External genitalia: normal general appearance  Urinary system: urethral meatus normal, bladder nontender  Vaginal: normal mucosa without prolapse or lesions  Cervix: absent, removed surgically  Uterus: absent, removed surgically  Adnexa: normal size, nontender bilaterally   Extremities: Normal ROM; no edema, no cyanosis   Neurologial: Normal strength and tone. No focal numbness or weakness.   Psychiatric: normal mood, speech, dress, and thought processes       Assessment:       Healthy female exam.     1. Dysuria    2. OAB (overactive bladder)    3. Encntr for gyn exam (general) (routine) w/o abn findings          Plan:      Problem List Items Addressed This Visit    None  Visit Diagnoses         Dysuria    -  Primary    Relevant Orders    POCT URINE DIPSTICK WITHOUT MICROSCOPE      OAB (overactive  bladder)        Relevant Medications    mirabegron (MYRBETRIQ) 25 mg Tb24 ER tablet      Encntr for gyn exam (general) (routine) w/o abn findings                COUNSELING:  Sindhu was counseled on STD prevention, use and side-effects of various contraceptive measures, A.C.O.G. Pap guidelines and recommendations for yearly pelvic exams in addition to recommendations for monthly self breast exams; to see her PCP for other health maintenance.

## 2025-03-26 ENCOUNTER — RESULTS FOLLOW-UP (OUTPATIENT)
Dept: OBSTETRICS AND GYNECOLOGY | Facility: CLINIC | Age: 55
End: 2025-03-26

## 2025-03-26 LAB
BILIRUB SERPL-MCNC: NEGATIVE MG/DL
BLOOD URINE, POC: NEGATIVE
CLARITY, POC UA: CLEAR
COLOR, POC UA: YELLOW
GLUCOSE UR QL STRIP: NEGATIVE
KETONES UR QL STRIP: NEGATIVE
LEUKOCYTE ESTERASE URINE, POC: NORMAL
NITRITE, POC UA: NEGATIVE
PH, POC UA: 6
PROTEIN, POC: NORMAL
SPECIFIC GRAVITY, POC UA: 1.02
UROBILINOGEN, POC UA: NEGATIVE

## 2025-07-07 DIAGNOSIS — M53.86 SCIATICA OF RIGHT SIDE ASSOCIATED WITH DISORDER OF LUMBAR SPINE: Primary | ICD-10-CM

## (undated) DEVICE — TOURNIQUET SB QC DP 34X4IN

## (undated) DEVICE — SEE MEDLINE ITEM 146298

## (undated) DEVICE — BANDAGE ESMARK ELASTIC ST 4X9

## (undated) DEVICE — SUT 3-0 ETHILON 18 FS-1

## (undated) DEVICE — TUBE SET INFLOW/OUTFLOW

## (undated) DEVICE — UNDERGLOVES BIOGEL PI SIZE 8.5

## (undated) DEVICE — STOCKINET 4INX48

## (undated) DEVICE — TOWEL OR DISP STRL BLUE 4/PK

## (undated) DEVICE — SUT MONOCRYL 4-0 PS-2

## (undated) DEVICE — BLADE SAGITTAL 18 X 1.27 X 90M

## (undated) DEVICE — SEE L#120831

## (undated) DEVICE — SUT 0 VICRYL / CT-1

## (undated) DEVICE — SEE MEDLINE ITEM 146313

## (undated) DEVICE — MASK FLYTE HOOD PEEL AWAY

## (undated) DEVICE — NDL 18GA X1 1/2 REG BEVEL

## (undated) DEVICE — SEE MEDLINE ITEM 157173

## (undated) DEVICE — STAPLER SKIN PROXIMATE WIDE

## (undated) DEVICE — GOWN SMART IMP BREATHABLE XXLG

## (undated) DEVICE — BLADE SURG #15 CARBON STEEL

## (undated) DEVICE — DRAPE PLASTIC U 60X72

## (undated) DEVICE — GLOVE SURG BIOGEL LATEX SZ 7.5

## (undated) DEVICE — SEE MEDLINE ITEM 156955

## (undated) DEVICE — NDL HYPO REG 25G X 1 1/2

## (undated) DEVICE — DRAPE EMERALD 87X114.75X113

## (undated) DEVICE — SEE MEDLINE ITEM 146292

## (undated) DEVICE — SEE MEDLINE ITEM 157169

## (undated) DEVICE — SOL IRR NACL .9% 3000ML

## (undated) DEVICE — NDL SPINAL 18GX3.5 SPINOCAN

## (undated) DEVICE — SEE MEDLINE ITEM 154981

## (undated) DEVICE — GLOVE BIOGEL SKINSENSE PI 8.0

## (undated) DEVICE — SYR 30CC LUER LOCK

## (undated) DEVICE — MANIFOLD 4 PORT

## (undated) DEVICE — BOWL CEMENT

## (undated) DEVICE — DRAPE STERI INSTRUMENT 1018

## (undated) DEVICE — BANDAGE MATRIX HK LOOP 2IN 5YD

## (undated) DEVICE — SEE MEDLINE ITEM 157116

## (undated) DEVICE — WRAP PROTECTIVE LEG POS STRL

## (undated) DEVICE — DRESSING AQUACEL AG ADV 3.5X12

## (undated) DEVICE — COVER OVERHEAD SURG LT BLUE

## (undated) DEVICE — SEE MEDLINE ITEM 146231

## (undated) DEVICE — ALCOHOL 70% ISOP W/GREEN 16OZ

## (undated) DEVICE — PUMP COLD THERAPY

## (undated) DEVICE — APPLICATOR CHLORAPREP ORN 26ML

## (undated) DEVICE — PAD PREP 50/CA

## (undated) DEVICE — PROBE ARTHSCP EDGE ENERGY 50

## (undated) DEVICE — GAUZE SPONGE 4X4 12PLY

## (undated) DEVICE — ELECTRODE REM PLYHSV RETURN 9

## (undated) DEVICE — KIT IRR SUCTION HND PIECE

## (undated) DEVICE — SYR 10CC LUER LOCK

## (undated) DEVICE — SEE MEDLINE ITEM 157150

## (undated) DEVICE — TOURNIQUET SB QC SP 34X4IN

## (undated) DEVICE — PACK BASIC

## (undated) DEVICE — TAPE SURG DURAPORE 2 X10YD

## (undated) DEVICE — PAD COLD THERAPY KNEE WRAP ON

## (undated) DEVICE — TOURNIQUET SB QC DP 18X4IN

## (undated) DEVICE — SUT VICRYL BR 1 GEN 27 CT-1

## (undated) DEVICE — SEE MEDLINE ITEM 157117

## (undated) DEVICE — GOWN SMARTGOWN LVL4 X-LONG XL

## (undated) DEVICE — BLADE SAW STERN .076MM 6.1MM L

## (undated) DEVICE — SOL 9P NACL IRR PIC IL

## (undated) DEVICE — BANDAGE ESMARK 6X12

## (undated) DEVICE — TRAY ARTHROSCOPY 2/CS

## (undated) DEVICE — PIN FIX TEMP 1/8X2.5IN LF STER
Type: IMPLANTABLE DEVICE | Site: KNEE | Status: NON-FUNCTIONAL
Removed: 2021-05-05

## (undated) DEVICE — KIT TOTAL KNEE TKOFG

## (undated) DEVICE — SEE MEDLINE ITEM 152487

## (undated) DEVICE — SUT VICRYL 3-0 27 SH

## (undated) DEVICE — Device

## (undated) DEVICE — PAD ABD 8X10 STERILE

## (undated) DEVICE — BLADE SCALP OPHTL BEVEL STR

## (undated) DEVICE — ADHESIVE DERMABOND ADVANCED

## (undated) DEVICE — PAD CAST SPECIALIST 2X4

## (undated) DEVICE — DRESSING XEROFORM FOIL PK 1X8